# Patient Record
Sex: FEMALE | Race: BLACK OR AFRICAN AMERICAN | NOT HISPANIC OR LATINO | Employment: OTHER | URBAN - METROPOLITAN AREA
[De-identification: names, ages, dates, MRNs, and addresses within clinical notes are randomized per-mention and may not be internally consistent; named-entity substitution may affect disease eponyms.]

---

## 2017-04-05 ENCOUNTER — OFFICE VISIT (OUTPATIENT)
Dept: INTERNAL MEDICINE | Facility: CLINIC | Age: 67
End: 2017-04-05
Payer: MEDICARE

## 2017-04-05 VITALS
HEART RATE: 66 BPM | HEIGHT: 62 IN | SYSTOLIC BLOOD PRESSURE: 136 MMHG | DIASTOLIC BLOOD PRESSURE: 80 MMHG | TEMPERATURE: 98 F | BODY MASS INDEX: 33.34 KG/M2 | WEIGHT: 181.19 LBS

## 2017-04-05 DIAGNOSIS — Z00.00 ENCOUNTER FOR PREVENTIVE HEALTH EXAMINATION: Primary | ICD-10-CM

## 2017-04-05 DIAGNOSIS — I70.0 ATHEROSCLEROSIS OF AORTA: ICD-10-CM

## 2017-04-05 DIAGNOSIS — E78.5 HYPERLIPIDEMIA, UNSPECIFIED HYPERLIPIDEMIA TYPE: ICD-10-CM

## 2017-04-05 DIAGNOSIS — I10 ESSENTIAL HYPERTENSION: ICD-10-CM

## 2017-04-05 DIAGNOSIS — I69.30 HISTORY OF CVA WITH RESIDUAL DEFICIT: ICD-10-CM

## 2017-04-05 PROCEDURE — 99999 PR PBB SHADOW E&M-EST. PATIENT-LVL III: CPT | Mod: PBBFAC,,, | Performed by: PHYSICIAN ASSISTANT

## 2017-04-05 PROCEDURE — G0009 ADMIN PNEUMOCOCCAL VACCINE: HCPCS | Mod: S$GLB,,, | Performed by: PHYSICIAN ASSISTANT

## 2017-04-05 PROCEDURE — 3079F DIAST BP 80-89 MM HG: CPT | Mod: S$GLB,,, | Performed by: PHYSICIAN ASSISTANT

## 2017-04-05 PROCEDURE — 90732 PPSV23 VACC 2 YRS+ SUBQ/IM: CPT | Mod: S$GLB,,, | Performed by: PHYSICIAN ASSISTANT

## 2017-04-05 PROCEDURE — 3075F SYST BP GE 130 - 139MM HG: CPT | Mod: S$GLB,,, | Performed by: PHYSICIAN ASSISTANT

## 2017-04-05 PROCEDURE — 99499 UNLISTED E&M SERVICE: CPT | Mod: S$GLB,,, | Performed by: PHYSICIAN ASSISTANT

## 2017-04-05 PROCEDURE — G0439 PPPS, SUBSEQ VISIT: HCPCS | Mod: S$GLB,,, | Performed by: PHYSICIAN ASSISTANT

## 2017-04-05 NOTE — PATIENT INSTRUCTIONS
Counseling and Referral of Other Preventative  (Italic type indicates deductible and co-insurance are waived)    Patient Name: Tracy Najera  Today's Date: 4/5/2017      SERVICE LIMITATIONS RECOMMENDATION    Vaccines    · Pneumococcal (once after 65)    · Influenza (annually)    · Hepatitis B (if medium/high risk)    · Prevnar 13      Hepatitis B medium/high risk factors:       - End-stage renal disease       - Hemophiliacs who received Factor VII or         IX concentrates       - Clients of institutions for the mentally             retarded       - Persons who live in the same house as          a HepB carrier       - Homosexual men       - Illicit injectable drug abusers     Pneumococcal: Scheduled - see appointments     Influenza: Done, repeat in one year     Hepatitis B: Done, no repeat necessary     Prevnar 13: Done, no repeat necessary    Mammogram (biennial age 50-74)  Annually (age 40 or over)  Done this year, repeat every year    Pap (up to age 70 and after 70 if unknown history or abnormal study last 10 years)    N/A     The USPSTF recommends against screening for cervical cancer in women who have had a hysterectomy with removal of the cervix and who do not have a history of a high-grade precancerous lesion (cervical intraepithelial neoplasia [ARINA] grade 2 or 3) or cervical cancer.     Colorectal cancer screening (to age 75)    · Fecal occult blood test (annual)  · Flexible sigmoidoscopy (5y)  · Screening colonoscopy (10y)  · Barium enema   Last done 2013, recommend to repeat every 5  years    Diabetes self-management training (no USPSTF recommendations)  Requires referral by treating physician for patient with diabetes or renal disease. 10 hours of initial DSMT sessions of no less than 30 minutes each in a continuous 12-month period. 2 hours of follow-up DSMT in subsequent years.  N/A    Bone mass measurements (age 65 & older, biennial)  Requires diagnosis related to osteoporosis or estrogen  deficiency. Biennial benefit unless patient has history of long-term glucocorticoid  Last done 2015, recommend to repeat every 3  years    Glaucoma screening (no USPSTF recommendation)  Diabetes mellitus, family history   , age 50 or over    American, age 65 or over  N/A    Medical nutrition therapy for diabetes or renal disease (no recommended schedule)  Requires referral by treating physician for patient with diabetes or renal disease or kidney transplant within the past 3 years.  Can be provided in same year as diabetes self-management training (DSMT), and CMS recommends medical nutrition therapy take place after DSMT. Up to 3 hours for initial year and 2 hours in subsequent years.  N/A    Cardiovascular screening blood tests (every 5 years)  · Fasting lipid panel  Order as a panel if possible  Done this year, repeat every year    Diabetes screening tests (at least every 3 years, Medicare covers annually or at 6-month intervals for prediabetic patients)  · Fasting blood sugar (FBS) or glucose tolerance test (GTT)  Patient must be diagnosed with one of the following:       - Hypertension       - Dyslipidemia       - Obesity (BMI 30kg/m2)       - Previous elevated impaired FBS or GTT       ... or any two of the following:       - Overweight (BMI 25 but <30)       - Family history of diabetes       - Age 65 or older       - History of gestational diabetes or birth of baby weighing more than 9 pounds  N/A    Abdominal aortic aneurysm screening (once)  · Sonogram   Limited to patients who meet one of the following criteria:       - Men who are 65-75 years old and have smoked more than 100 cigarette in their lifetime       - Anyone with a family history of abdominal aortic aneurysm       - Anyone recommended for screening by the USPSTF  N/A    HIV screening (annually for increased risk patients)  · HIV-1 and HIV-2 by EIA, or YADI, rapid antibody test or oral mucosa transudate  Patients must be  at increased risk for HIV infection per USPSTF guidelines or pregnant. Tests covered annually for patient at increased risk or as requested by the patient. Pregnant patients may receive up to 3 tests during pregnancy.  Risks discussed, screening is not recommended    Smoking cessation counseling (up to 8 sessions per year)  Patients must be asymptomatic of tobacco-related conditions to receive as a preventative service.  Non-smoker    Subsequent annual wellness visit  At least 12 months since last AWV  Return in one year     The following information is provided to all patients.  This information is to help you find resources for any of the problems found today that may be affecting your health:                Living healthy guide: www.[facility].[place].[facility]      Understanding Diabetes: www.diabetes.org      Eating healthy: www.cdc.gov/healthyweight      CDC home safety checklist: www.cdc.gov/steadi/patient.html      Agency on Aging: www.goea.[place].[facility]      Alcoholics anonymous (AA): www.aa.org      Physical Activity: www.elder.nih.gov/oa2afdb      Tobacco use: www.quitwithusla.org

## 2017-04-05 NOTE — PROGRESS NOTES
"Subjective:       Patient ID: Tracy Najera is a 66 y.o. female.    Chief Complaint: HRA    HPI    Past Medical History:   Diagnosis Date    CVA (cerebral vascular accident) 2008    R hemiparesis, R hand contracture    HTN (hypertension)     Hyperlipidemia        Current Outpatient Prescriptions   Medication Sig Dispense Refill    atorvastatin (LIPITOR) 20 MG tablet Take 1 tablet (20 mg total) by mouth once daily. 90 tablet 3    clopidogrel (PLAVIX) 75 mg tablet Take 1 tablet (75 mg total) by mouth once daily. 90 tablet 3    losartan (COZAAR) 100 MG tablet Take 1 tablet (100 mg total) by mouth once daily. 90 tablet 3    hydrochlorothiazide (HYDRODIURIL) 12.5 MG Tab Take 1 tablet (12.5 mg total) by mouth once daily. 90 tablet 3     No current facility-administered medications for this visit.        Review of Systems    Objective:   /80  Pulse 66  Temp 98 °F (36.7 °C) (Tympanic)   Ht 5' 1.5" (1.562 m)  Wt 82.2 kg (181 lb 3.5 oz)  BMI 33.69 kg/m2     Physical Exam      Lab Results   Component Value Date    WBC 5.57 10/06/2016    HGB 12.2 10/06/2016    HCT 38.2 10/06/2016     10/06/2016    CHOL 202 (H) 10/06/2016    TRIG 72 10/06/2016    HDL 61 10/06/2016    ALT 24 10/06/2016    AST 22 10/06/2016     10/06/2016    K 3.9 10/06/2016     10/06/2016    CREATININE 1.0 10/06/2016    BUN 14 10/06/2016    CO2 23 10/06/2016    TSH 1.264 10/06/2016    INR 1.0 04/30/2012       Assessment:       1. Encounter for preventive health examination        Plan:   Encounter for preventive health examination      "

## 2017-04-26 PROBLEM — I70.0 ATHEROSCLEROSIS OF AORTA: Status: ACTIVE | Noted: 2017-04-26

## 2017-04-26 NOTE — PROGRESS NOTES
"Tracy Najera presented for a  Medicare AWV and comprehensive Health Risk Assessment today. The following components were reviewed and updated:    · Medical history  · Family History  · Social history  · Allergies and Current Medications  · Health Risk Assessment  · Health Maintenance  · Care Team     ** See Completed Assessments for Annual Wellness Visit within the encounter summary.**       The following assessments were completed:  · Living Situation  · CAGE  · Depression Screening  · Timed Get Up and Go  · Whisper Test  · Cognitive Function Screening  · Nutrition Screening  · ADL Screening  · PAQ Screening    Vitals:    04/05/17 1253   BP: 136/80   Pulse: 66   Temp: 98 °F (36.7 °C)   TempSrc: Tympanic   Weight: 82.2 kg (181 lb 3.5 oz)   Height: 5' 1.5" (1.562 m)     Body mass index is 33.69 kg/(m^2).  Physical Exam   Constitutional: She is oriented to person, place, and time. She appears well-developed and well-nourished. No distress.   HENT:   Head: Normocephalic and atraumatic.   Right Ear: External ear normal.   Left Ear: External ear normal.   Mouth/Throat: Oropharynx is clear and moist.   Eyes: Conjunctivae and EOM are normal. Pupils are equal, round, and reactive to light.   Neck: Normal range of motion. Neck supple.   Cardiovascular: Normal rate, regular rhythm and normal heart sounds.    Pulmonary/Chest: Effort normal and breath sounds normal.   Abdominal: Soft.   Neurological: She is alert and oriented to person, place, and time. She displays normal reflexes. No cranial nerve deficit. Coordination normal.         Diagnoses and health risks identified today and associated recommendations/orders:    Encounter for preventive health examination    Hyperlipidemia, unspecified hyperlipidemia type  Comments:  On statin. Last labs show ok control but need to be monitored.     History of CVA with residual deficit  Comments:  No Hemiparesis.  Pt improved greatly with therapy.  BP, HLD controlled, patient also on " plavix.     Essential hypertension  Comments:  Controlled on losartan, HCTZ. Followed by PCP.     Atherosclerosis of aorta  Comments:  Mildly tortuous thoracic aorta per CXR in 2011        Provided Tracy with a 5-10 year written screening schedule and personal prevention plan. Recommendations were developed using the USPSTF age appropriate recommendations. Education, counseling, and referrals were provided as needed. After Visit Summary printed and given to patient which includes a list of additional screenings\tests needed.    No Follow-up on file.    MALACHI Reardon

## 2017-05-05 ENCOUNTER — OFFICE VISIT (OUTPATIENT)
Dept: INTERNAL MEDICINE | Facility: CLINIC | Age: 67
End: 2017-05-05
Payer: MEDICARE

## 2017-05-05 ENCOUNTER — TELEPHONE (OUTPATIENT)
Dept: INTERNAL MEDICINE | Facility: CLINIC | Age: 67
End: 2017-05-05

## 2017-05-05 VITALS
DIASTOLIC BLOOD PRESSURE: 86 MMHG | BODY MASS INDEX: 30.68 KG/M2 | SYSTOLIC BLOOD PRESSURE: 132 MMHG | HEIGHT: 64 IN | WEIGHT: 179.69 LBS | HEART RATE: 64 BPM | TEMPERATURE: 99 F

## 2017-05-05 DIAGNOSIS — N64.52 BLOODY DISCHARGE FROM LEFT NIPPLE: ICD-10-CM

## 2017-05-05 DIAGNOSIS — E78.5 HYPERLIPIDEMIA, UNSPECIFIED HYPERLIPIDEMIA TYPE: ICD-10-CM

## 2017-05-05 DIAGNOSIS — I69.30 HISTORY OF CVA WITH RESIDUAL DEFICIT: ICD-10-CM

## 2017-05-05 DIAGNOSIS — I10 ESSENTIAL HYPERTENSION: Primary | ICD-10-CM

## 2017-05-05 DIAGNOSIS — N64.52 BLOODY DISCHARGE FROM LEFT NIPPLE: Primary | ICD-10-CM

## 2017-05-05 DIAGNOSIS — I69.359 CVA, OLD, HEMIPARESIS: ICD-10-CM

## 2017-05-05 PROCEDURE — 99999 PR PBB SHADOW E&M-EST. PATIENT-LVL III: CPT | Mod: PBBFAC,,, | Performed by: FAMILY MEDICINE

## 2017-05-05 PROCEDURE — 3075F SYST BP GE 130 - 139MM HG: CPT | Mod: S$GLB,,, | Performed by: FAMILY MEDICINE

## 2017-05-05 PROCEDURE — 1126F AMNT PAIN NOTED NONE PRSNT: CPT | Mod: S$GLB,,, | Performed by: FAMILY MEDICINE

## 2017-05-05 PROCEDURE — 1160F RVW MEDS BY RX/DR IN RCRD: CPT | Mod: S$GLB,,, | Performed by: FAMILY MEDICINE

## 2017-05-05 PROCEDURE — 1157F ADVNC CARE PLAN IN RCRD: CPT | Mod: 8P,S$GLB,, | Performed by: FAMILY MEDICINE

## 2017-05-05 PROCEDURE — 99214 OFFICE O/P EST MOD 30 MIN: CPT | Mod: S$GLB,,, | Performed by: FAMILY MEDICINE

## 2017-05-05 PROCEDURE — 1159F MED LIST DOCD IN RCRD: CPT | Mod: S$GLB,,, | Performed by: FAMILY MEDICINE

## 2017-05-05 PROCEDURE — 99499 UNLISTED E&M SERVICE: CPT | Mod: S$GLB,,, | Performed by: FAMILY MEDICINE

## 2017-05-05 PROCEDURE — 3079F DIAST BP 80-89 MM HG: CPT | Mod: S$GLB,,, | Performed by: FAMILY MEDICINE

## 2017-05-05 NOTE — PROGRESS NOTES
"Subjective:      Patient ID: Tracy Najera is a 66 y.o. female.    Chief Complaint: Follow-up (6 month )    HPI  67 yo female with hx of CVA with some R sided hemiparesis/contracture here for f/u.  Taking meds daily, no problems.  Had wanted to work on weight loss, but no change since visit 6 mos ago.  Past few days has noticed some bloody discharge from her Left nipple.  No pain.  No trauma/abrasion.  NO change in clothing or bra.  It soaked thru her bra and shirt yesterday.  Normally does mammogram at [facility], last one 2016//within past 5-6 mos she thinks.  No fam hx of breast cancer.    Past Medical History:   Diagnosis Date    CVA (cerebral vascular accident) 2008    R hemiparesis, R hand contracture    HTN (hypertension)     Hyperlipidemia      Family History   Problem Relation Age of Onset    Hypertension Mother     Hypertension Sister     Stroke Brother     Cancer Neg Hx      Past Surgical History:   Procedure Laterality Date    HYSTERECTOMY      partial     TONSILLECTOMY       Social History   Substance Use Topics    Smoking status: Never Smoker    Smokeless tobacco: None    Alcohol use 0.0 oz/week     0 Standard drinks or equivalent per week      Comment: occ       /86  Pulse 64  Temp 98.5 °F (36.9 °C) (Tympanic)   Ht 5' 4" (1.626 m)  Wt 81.5 kg (179 lb 10.8 oz)  BMI 30.84 kg/m2    Review of Systems   Constitutional: Negative for chills, fever and unexpected weight change.   HENT: Negative.    Eyes: Negative.    Respiratory: Negative.    Cardiovascular: Negative.    Gastrointestinal: Negative.    Endocrine: Negative.    Genitourinary: Negative.    Neurological: Negative for dizziness and light-headedness.     Objective:     Physical Exam   Constitutional: She is oriented to person, place, and time. She appears well-developed and well-nourished. No distress.   Eyes: Pupils are equal, round, and reactive to light.   Neck: Normal range of motion. Neck supple.   Cardiovascular: Normal " rate, regular rhythm and normal heart sounds.    Pulmonary/Chest: Effort normal and breath sounds normal. No respiratory distress. She has no wheezes. She has no rales. Right breast exhibits no inverted nipple, no mass, no nipple discharge, no skin change and no tenderness. Left breast exhibits nipple discharge. Left breast exhibits no inverted nipple, no mass, no skin change and no tenderness.   Able to barely press on Left nipple, at first there was bloody/serous discharge but with continued pressure more of a thick bloody discharge noted.  No pain/tenderness.   Abdominal: Soft. Bowel sounds are normal. She exhibits no distension. There is no tenderness.   Genitourinary: There is breast discharge and bleeding.   Musculoskeletal: She exhibits no edema.   Lymphadenopathy:     She has no cervical adenopathy.   Neurological: She is alert and oriented to person, place, and time. No cranial nerve deficit.   Skin: Skin is warm and dry. No rash noted.   Psychiatric: She has a normal mood and affect. Her behavior is normal. Judgment and thought content normal.   Nursing note and vitals reviewed.      Lab Results   Component Value Date    WBC 5.57 10/06/2016    HGB 12.2 10/06/2016    HCT 38.2 10/06/2016     10/06/2016    CHOL 202 (H) 10/06/2016    TRIG 72 10/06/2016    HDL 61 10/06/2016    ALT 24 10/06/2016    AST 22 10/06/2016     10/06/2016    K 3.9 10/06/2016     10/06/2016    CREATININE 1.0 10/06/2016    BUN 14 10/06/2016    CO2 23 10/06/2016    TSH 1.264 10/06/2016    INR 1.0 04/30/2012       Assessment:     1. Essential hypertension    2. Hyperlipidemia, unspecified hyperlipidemia type    3. History of CVA with residual deficit    4. CVA, old, hemiparesis    5. Bloody discharge from left nipple       Plan:   Essential hypertension    Hyperlipidemia, unspecified hyperlipidemia type  -     TSH; Future; Expected date: 5/5/17    History of CVA with residual deficit    CVA, old, hemiparesis    Bloody  discharge from left nipple  -     Mammo Digital Diagnostic Left with CAD; Future; Expected date: 5/5/17  -     CBC auto differential; Future; Expected date: 5/5/17  -     TSH; Future; Expected date: 5/5/17  -     Prolactin; Future; Expected date: 5/5/17    Unilateral bloody nipple discharge on L side  Diagnostic mammo/US if needed  CBC/TSH/prolactin level today  BP stable  Work on weight  Will call with results/recs on above  F/u 6 mos for chronic conditions

## 2017-05-05 NOTE — TELEPHONE ENCOUNTER
Can you re entered labs for pt.  Ana couldn't draw her as she was very dehydrated.  [facility] will do [facility] on Monday along with her mammogram.  Will just need to link to lab appt.

## 2017-05-05 NOTE — MR AVS SNAPSHOT
[facility]  [address] [place] [place] [address]  Phone: [phone]  Fax: [phone]                  Tracy Najera   2017 1:00 PM   Office Visit    Description:  Female : 1950   Provider:  Ryan Hopper MD   [facility]           Reason for Visit     Follow-up           Diagnoses this Visit        Comments    Essential hypertension    -  Primary     Hyperlipidemia, unspecified hyperlipidemia type         History of CVA with residual deficit         CVA, old, hemiparesis         Bloody discharge from left nipple                To Do List           Future Appointments        Provider Department Dept Phone    2017 1:00 PM Ryan Hopper MD [facility] [phone]    2017 1:30 PM [facility] [phone]    2017 10:00 AM [facility] MAMMO2-DX [facility] [phone]      Goals (5 Years of Data)     None      [facility]     [facility] Nurse Care Line -  Assistance  Unless otherwise directed by your provider, please contact [facility], our nurse care line that is available for  assistance.     Registered nurses in the [facility] provide: appointment scheduling, clinical advisement, health education, and other advisory services.  Call: [phone] (toll free)               Medications           Message regarding Medications     Verify the changes and/or additions to your medication regime listed below are the same as discussed with your clinician today.  If any of these changes or additions are incorrect, please notify your healthcare provider.             Verify that the below list of medications is an accurate representation of the medications you are currently taking.  If none reported, the list may be blank. If incorrect, please contact your healthcare provider. Carry this list with you in case of  "emergency.           Current Medications     atorvastatin (LIPITOR) 20 MG tablet Take 1 tablet (20 mg total) by mouth once daily.    clopidogrel (PLAVIX) 75 mg tablet Take 1 tablet (75 mg total) by mouth once daily.    hydrochlorothiazide (HYDRODIURIL) 12.5 MG Tab Take 1 tablet (12.5 mg total) by mouth once daily.    losartan (COZAAR) 100 MG tablet Take 1 tablet (100 mg total) by mouth once daily.           Clinical Reference Information           Your Vitals Were     BP Pulse Temp Height Weight BMI    132/86 64 98.5 °F (36.9 °C) (Tympanic) 5' 4" (1.626 m) 81.5 kg (179 lb 10.8 oz) 30.84 kg/m2      Blood Pressure          Most Recent Value    BP  132/86      Allergies as of 5/5/2017     No Known Allergies      Immunizations Administered on Date of Encounter - 5/5/2017     None      Orders Placed During Today's Visit     Future Labs/Procedures Expected by Expires    CBC auto differential  5/5/2017 8/3/2017    Mammo Digital Diagnostic Left with CAD  5/5/2017 7/5/2018    Prolactin  5/5/2017 7/4/2018    TSH  5/5/2017 8/3/2017      Language Assistance Services     ATTENTION: Language assistance services are available, free of charge. Please call [phone].      ATENCIÓN: Si joanie arteaga, tiene a smith disposición servicios gratuitos de asistencia lingüística. Llame al [phone].     [facility] Ý: N?u b?n nói Ti?ng Vi?t, có các d?ch v? h? tr? ngôn ng? mi?n phí dành cho b?n. G?i s? [phone].         [facility] complies with applicable Federal civil rights laws and does not discriminate on the basis of race, color, national origin, age, disability, or sex.        "

## 2017-05-08 ENCOUNTER — TELEPHONE (OUTPATIENT)
Dept: INTERNAL MEDICINE | Facility: CLINIC | Age: 67
End: 2017-05-08

## 2017-05-08 ENCOUNTER — HOSPITAL ENCOUNTER (OUTPATIENT)
Dept: RADIOLOGY | Facility: HOSPITAL | Age: 67
Discharge: HOME | End: 2017-05-08
Attending: FAMILY MEDICINE
Payer: MEDICARE

## 2017-05-08 DIAGNOSIS — N64.52 BLOODY DISCHARGE FROM LEFT NIPPLE: ICD-10-CM

## 2017-05-08 DIAGNOSIS — R92.8 ABNORMAL MAMMOGRAM OF LEFT BREAST: ICD-10-CM

## 2017-05-08 DIAGNOSIS — N64.52 BLOODY DISCHARGE FROM LEFT NIPPLE: Primary | ICD-10-CM

## 2017-05-08 PROCEDURE — 77065 DX MAMMO INCL CAD UNI: CPT | Mod: 26,LT,, | Performed by: RADIOLOGY

## 2017-05-08 PROCEDURE — 76642 ULTRASOUND BREAST LIMITED: CPT | Mod: TC,PO,LT

## 2017-05-08 PROCEDURE — 77061 BREAST TOMOSYNTHESIS UNI: CPT | Mod: 26,LT,, | Performed by: RADIOLOGY

## 2017-05-08 PROCEDURE — 77061 BREAST TOMOSYNTHESIS UNI: CPT | Mod: TC,LT

## 2017-05-08 PROCEDURE — 76642 ULTRASOUND BREAST LIMITED: CPT | Mod: 26,LT,, | Performed by: RADIOLOGY

## 2017-05-08 NOTE — TELEPHONE ENCOUNTER
Pt needs to see surgery for Left bloody nipple discharge.  Order in.  Can see Danay Madera first if needed.

## 2017-05-09 ENCOUNTER — DOCUMENTATION ONLY (OUTPATIENT)
Dept: RADIOLOGY | Facility: HOSPITAL | Age: 67
End: 2017-05-09

## 2017-05-09 NOTE — PROGRESS NOTES
Breast Care Management Follow-Up:    Date of Mammogram:05/08/17    Mammogram Reason:Bloody discharge from left breast    Mammogram Results:and Left U/S - Finding 1 - 4mm round mass in the left breast at 2:00 is consistent with a simple cyst. Finding 2 - single dilated mildly ectatic duct in the left breast.      Referrals/Recommendations:Surgery consult; galactogram or MRI may be helpful. Annual mammo rec.        Patient Status:05/09/17 Pt has an appt with Danay Madera NP on 5/10/17. Results letter and report mailed to pt.

## 2017-05-10 ENCOUNTER — OFFICE VISIT (OUTPATIENT)
Dept: SURGERY | Facility: CLINIC | Age: 67
End: 2017-05-10
Payer: MEDICARE

## 2017-05-10 VITALS
WEIGHT: 179 LBS | SYSTOLIC BLOOD PRESSURE: 124 MMHG | TEMPERATURE: 98 F | BODY MASS INDEX: 31.71 KG/M2 | DIASTOLIC BLOOD PRESSURE: 62 MMHG | HEART RATE: 64 BPM

## 2017-05-10 VITALS
SYSTOLIC BLOOD PRESSURE: 124 MMHG | TEMPERATURE: 98 F | RESPIRATION RATE: 18 BRPM | HEIGHT: 63 IN | BODY MASS INDEX: 31.71 KG/M2 | WEIGHT: 179 LBS | OXYGEN SATURATION: 99 % | DIASTOLIC BLOOD PRESSURE: 62 MMHG

## 2017-05-10 DIAGNOSIS — N64.52 BLOODY DISCHARGE FROM LEFT NIPPLE: Primary | ICD-10-CM

## 2017-05-10 DIAGNOSIS — R92.8 ABNORMAL MAMMOGRAM OF LEFT BREAST: ICD-10-CM

## 2017-05-10 PROCEDURE — 3074F SYST BP LT 130 MM HG: CPT | Mod: S$GLB,,, | Performed by: SURGERY

## 2017-05-10 PROCEDURE — 1160F RVW MEDS BY RX/DR IN RCRD: CPT | Mod: S$GLB,,, | Performed by: SURGERY

## 2017-05-10 PROCEDURE — 99204 OFFICE O/P NEW MOD 45 MIN: CPT | Mod: S$GLB,,, | Performed by: SURGERY

## 2017-05-10 PROCEDURE — 3078F DIAST BP <80 MM HG: CPT | Mod: S$GLB,,, | Performed by: SURGERY

## 2017-05-10 PROCEDURE — 1159F MED LIST DOCD IN RCRD: CPT | Mod: S$GLB,,, | Performed by: SURGERY

## 2017-05-10 PROCEDURE — 99999 PR PBB SHADOW E&M-EST. PATIENT-LVL III: CPT | Mod: PBBFAC,,, | Performed by: SURGERY

## 2017-05-10 PROCEDURE — 99999 PR PBB SHADOW E&M-EST. PATIENT-LVL III: CPT | Mod: PBBFAC,,, | Performed by: NURSE PRACTITIONER

## 2017-05-10 RX ORDER — ONDANSETRON 4 MG/1
8 TABLET, ORALLY DISINTEGRATING ORAL EVERY 8 HOURS PRN
Status: CANCELLED | OUTPATIENT
Start: 2017-05-10

## 2017-05-10 RX ORDER — SODIUM CHLORIDE 9 MG/ML
INJECTION, SOLUTION INTRAVENOUS CONTINUOUS
Status: CANCELLED | OUTPATIENT
Start: 2017-05-10

## 2017-05-10 NOTE — PROGRESS NOTES
Patient ID: Tracy Najera is a 66 y.o. female.    Chief Complaint: Breast Discharge/Blood    HPI: Patient is referred by her PCP for the evaluation of bloody spontaneous nipple discharge that began about one week ago. Initially noted clear nipple discharge on her underclothes - was never bloody until went to her PCP- was noted to be blood at that time and has had one episode of blood on her underclothes since.     Review of Systems   Constitutional: Negative.  Negative for activity change and unexpected weight change.   HENT: Negative.  Negative for hearing loss, rhinorrhea and trouble swallowing.    Eyes: Negative.  Negative for discharge and visual disturbance.   Respiratory: Negative.  Negative for chest tightness and wheezing.    Cardiovascular: Negative.  Negative for chest pain and palpitations.   Gastrointestinal: Negative.  Negative for blood in stool, constipation, diarrhea and vomiting.   Endocrine: Negative.  Negative for polydipsia and polyuria.   Genitourinary: Negative.  Negative for difficulty urinating, dysuria, hematuria and menstrual problem.   Musculoskeletal: Positive for gait problem. Negative for arthralgias and joint swelling.        Has right sided weakness from CVA 10 years ago   Skin: Negative.    Allergic/Immunologic: Negative.    Neurological: Negative for weakness and headaches.   Hematological: Negative.    Psychiatric/Behavioral: Negative.  Negative for confusion and dysphoric mood.   Breast: pt denies any previous breast problems, no breast trauma, no breast pain and no history of breast discharge before recently. Denies any change in her medications.     Current Outpatient Prescriptions   Medication Sig Dispense Refill    atorvastatin (LIPITOR) 20 MG tablet Take 1 tablet (20 mg total) by mouth once daily. 90 tablet 3    clopidogrel (PLAVIX) 75 mg tablet Take 1 tablet (75 mg total) by mouth once daily. 90 tablet 3    hydrochlorothiazide (HYDRODIURIL) 12.5 MG Tab Take 1 tablet  (12.5 mg total) by mouth once daily. 90 tablet 3    losartan (COZAAR) 100 MG tablet Take 1 tablet (100 mg total) by mouth once daily. 90 tablet 3     No current facility-administered medications for this visit.        Review of patient's allergies indicates:  No Known Allergies    Past Medical History:   Diagnosis Date    CVA (cerebral vascular accident) 2008    R hemiparesis, R hand contracture    HTN (hypertension)     Hyperlipidemia        Past Surgical History:   Procedure Laterality Date    HYSTERECTOMY      partial     TONSILLECTOMY         Family History   Problem Relation Age of Onset    Hypertension Mother     Hypertension Sister     Stroke Brother     Cancer Neg Hx        Social History     Social History    Marital status:      Spouse name: N/A    Number of children: 1    Years of education: N/A     Occupational History     Not Employ     Social History Main Topics    Smoking status: Never Smoker    Smokeless tobacco: Not on file    Alcohol use 0.0 oz/week     0 Standard drinks or equivalent per week      Comment: occ    Drug use: No    Sexual activity: Yes     Partners: Male     Other Topics Concern    Not on file     Social History Narrative       Vitals:    05/10/17 1451   BP: 124/62   Resp: 18   Temp: 97.5 °F (36.4 °C)       Physical Exam   Constitutional: She is oriented to person, place, and time. She appears well-developed and well-nourished.   HENT:   Head: Normocephalic and atraumatic.   Right Ear: External ear normal.   Left Ear: External ear normal.   Eyes: Conjunctivae and EOM are normal. Pupils are equal, round, and reactive to light. Right eye exhibits no discharge. Left eye exhibits no discharge. No scleral icterus.   Neck: Normal range of motion. Neck supple. No thyromegaly present.   Cardiovascular: Normal rate and regular rhythm.    Pulmonary/Chest: Effort normal and breath sounds normal. Right breast exhibits no inverted nipple, no mass, no nipple discharge,  no skin change and no tenderness. Left breast exhibits nipple discharge. Left breast exhibits no inverted nipple, no mass, no skin change and no tenderness.       Abdominal: Soft. Bowel sounds are normal.   Musculoskeletal: Normal range of motion.   Lymphadenopathy:        Head (right side): No submental, no submandibular, no tonsillar, no preauricular, no posterior auricular and no occipital adenopathy present.        Head (left side): No submental, no submandibular, no tonsillar, no preauricular, no posterior auricular and no occipital adenopathy present.     She has no cervical adenopathy.        Right cervical: No superficial cervical and no posterior cervical adenopathy present.       Left cervical: No superficial cervical and no posterior cervical adenopathy present.     She has no axillary adenopathy.        Right: No supraclavicular adenopathy present.        Left: No supraclavicular adenopathy present.   Neurological: She is alert and oriented to person, place, and time.   Skin: Skin is warm. No erythema.   Psychiatric: She has a normal mood and affect. Her behavior is normal. Judgment and thought content normal.   Nursing note and vitals reviewed.    IMAGIN17:  MAMMO DIGITAL DIAGNOSTIC LEFT WITH TOMOSYNTHESIS_CAD  Views: left craniocaudal with tomosynthesis; left mediolateral with  tomosynthesis; left mediolateral oblique with tomosynthesis    There are scattered fibroglandular densities.    Finding 1:  There is a round mass measuring 4 millimeters with  circumscribed margins seen in the left breast at 2 o'clock.  Digital tomosynthesis was performed and used in the interpretation of the  images.  Images were evaluated with a Computer Aided Detection (CAD) system.    LEFT LIMITED ULTRASOUND FINDINGS:  High-resolution real-time ultrasound scanning was performed.    Finding 1:  The 2 o'clock lesion within the left breast is most consistent  with a simple cyst.    Finding 2:  Ultrasound evaluation of  the subareolar region of the left  breast demonstrates a single dilated mildly ectatic duct.  No obvious  intraductal masses are identified.  Impression  Finding 1:  Mass in the left breast is benign-negative.    Finding 2:  Area in the left breast requires additional evaluation. With  the given history of bloody nipple discharge, further evaluation with  galactogram or MRI may be helpful. Surgical consultation is also  recommended.    Routine follow-up mammogram in 1 year is recommended.    BI-RADS Category 0: Incomplete: Need Additional Evaluation    Menarche at 15 y/o   Misc 1  LMP: 40's  First pregnancy at 29 y/o  No hormone use and no history of radiation to the neck or chest wall    FH: negative for breast cancer    Pt takes plavix due to her history of CVA 10 years ago- has stopped it for a few days in the past for procedures with no problems.     Assessment & Plan:  1. Spontaneous bloody nipple discharge left breast nipple duct 9 oclock  2. Abnormal breast imaging- dilated duct.  3. Explained imaging results and clinical findings to the pt.  4. Recommend duct excision to rule out a papilloma that could be causing the spontaneous nipple discharge. Explained the procedure to the pt- usually outpatient- would have to stop her plavix prior to the procedure - ducts from under the nipple would be surgically removed and sent for path evaluation. Risk of bleeding, infection, blistering of the nipple, scarring, recurrence of discharge were discussed with the pt.   5. Pt scheduled to see Dr. John today to schedule this procedure. Pt agrees with the recommendations and verbalizes understanding.

## 2017-05-10 NOTE — MR AVS SNAPSHOT
[facility]  [address] [place] [address]  Phone: [phone]  Fax: [phone]                  Tracy Najera   5/10/2017 4:00 PM   Office Visit    Description:  Female : 1950   Provider:  Bernard John MD   Department:  [facility]           Reason for Visit     Consult           Diagnoses this Visit        Comments    Bloody discharge from left nipple    -  Primary            To Do List           Future Appointments        Provider Department Dept Phone    2017 10:15 AM EKG, [facility] [phone]    2017 10:50 AM LABORATORY, [facility] [phone]    2017 11:00 AM Bernard John MD [facility] [phone]      Goals (5 Years of Data)     None      [facility]     [facility] Nurse Care Line -  Assistance  Unless otherwise directed by your provider, please contact [facility], our nurse care line that is available for  assistance.     Registered nurses in the [facility] Center provide: appointment scheduling, clinical advisement, health education, and other advisory services.  Call: [phone] (toll free)               Medications           Message regarding Medications     Verify the changes and/or additions to your medication regime listed below are the same as discussed with your clinician today.  If any of these changes or additions are incorrect, please notify your healthcare provider.             Verify that the below list of medications is an accurate representation of the medications you are currently taking.  If none reported, the list may be blank. If incorrect, please contact your healthcare provider. Carry this list with you in case of emergency.           Current Medications     atorvastatin (LIPITOR) 20 MG tablet Take 1 tablet (20 mg total) by mouth once daily.    clopidogrel (PLAVIX) 75 mg tablet Take 1 tablet (75 mg  total) by mouth once daily.    hydrochlorothiazide (HYDRODIURIL) 12.5 MG Tab Take 1 tablet (12.5 mg total) by mouth once daily.    losartan (COZAAR) 100 MG tablet Take 1 tablet (100 mg total) by mouth once daily.           Clinical Reference Information           Your Vitals Were     BP Pulse Temp Weight BMI    124/62 64 97.5 °F (36.4 °C) (Oral) 81.2 kg (179 lb 0.2 oz) 31.71 kg/m2      Blood Pressure          Most Recent Value    BP  124/62      Allergies as of 5/10/2017     No Known Allergies      Immunizations Administered on Date of Encounter - 5/10/2017     None      Orders Placed During Today's Visit      Normal Orders This Visit    Case Request Operating Room: BIOPSY-BREAST excision ducts     Future Labs/Procedures Expected by Expires    Comprehensive metabolic panel  5/10/2017 7/9/2018    EKG 12-lead  As directed 5/10/2018      Language Assistance Services     ATTENTION: Language assistance services are available, free of charge. Please call [phone].      ATENCIÓN: Si habla jenni, tiene a smith disposición servicios gratuitos de asistencia lingüística. Llame al [phone].     CHÚ Ý: N?u b?n nói Ti?ng Vi?t, có các d?ch v? h? tr? ngôn ng? mi?n phí dành cho b?n. G?i s? [phone].         [facility] complies with applicable Federal civil rights laws and does not discriminate on the basis of race, color, national origin, age, disability, or sex.

## 2017-05-10 NOTE — LETTER
May 10, 2017      Ryan Hopper MD  [address] [place] [address]           [facility]  [address] [address]  Phone: [phone]  Fax: [phone]          Patient: Tracy Najera   MR Number: [id number]   YOB: 1950   Date of Visit: 5/10/2017       Dear Dr. Ryan Hopper:    Thank you for referring Tracy Najera to me for evaluation. Attached you will find relevant portions of my assessment and plan of care.    If you have questions, please do not hesitate to call me. I look forward to following Tracy Najera along with you.    Sincerely,    Bernard John MD    Enclosure  CC:  No Recipients    If you would like to receive this communication electronically, please contact [email or web address] or [phone] to request more information on [employer] access.    For providers and/or their staff who would like to refer a patient to [facility], please contact us through our one-stop-shop provider referral line, [facility] , at [phone].    If you feel you have received this communication in error or would no longer like to receive these types of communications, please e-mail [email or web address]

## 2017-05-10 NOTE — LETTER
May 10, 2017      Ryan Hopper MD  [address] [place] [address]           [facility]  [address] [place] [address]  Phone: [phone]  Fax: [phone]          Patient: Tracy Najera   MR Number: [id number]   YOB: 1950   Date of Visit: 5/10/2017       Dear Dr. Ryan Hopper:    Thank you for referring Tracy Najera to me for evaluation. Attached you will find relevant portions of my assessment and plan of care.    If you have questions, please do not hesitate to call me. I look forward to following Tracy Najera along with you.    Sincerely,    Danay Madera NP    Enclosure  CC:  No Recipients    If you would like to receive this communication electronically, please contact [email or web address] or [phone] to request more information on [employer] access.    For providers and/or their staff who would like to refer a patient to [facility], please contact us through our one-stop-shop provider referral line, [facility], at [phone].    If you feel you have received this communication in error or would no longer like to receive these types of communications, please e-mail [email or web address]

## 2017-05-10 NOTE — PROGRESS NOTES
HPI: Patient is referred by her PCP for the evaluation of bloody spontaneous nipple discharge that began about one week ago. Initially noted clear nipple discharge on her underclothes - was never bloody until went to her PCP- was noted to be blood at that time and has had one episode of blood on her underclothes since.      Review of Systems   Constitutional: Negative. Negative for activity change and unexpected weight change.   HENT: Negative. Negative for hearing loss, rhinorrhea and trouble swallowing.   Eyes: Negative. Negative for discharge and visual disturbance.   Respiratory: Negative. Negative for chest tightness and wheezing.   Cardiovascular: Negative. Negative for chest pain and palpitations.   Gastrointestinal: Negative. Negative for blood in stool, constipation, diarrhea and vomiting.   Endocrine: Negative. Negative for polydipsia and polyuria.   Genitourinary: Negative. Negative for difficulty urinating, dysuria, hematuria and menstrual problem.   Musculoskeletal: Positive for gait problem. Negative for arthralgias and joint swelling.   Has right sided weakness from CVA 10 years ago   Skin: Negative.   Allergic/Immunologic: Negative.   Neurological: Negative for weakness and headaches.   Hematological: Negative.   Psychiatric/Behavioral: Negative. Negative for confusion and dysphoric mood.   Breast: pt denies any previous breast problems, no breast trauma, no breast pain and no history of breast discharge before recently. Denies any change in her medications.       Current Medications           Current Outpatient Prescriptions   Medication Sig Dispense Refill    atorvastatin (LIPITOR) 20 MG tablet Take 1 tablet (20 mg total) by mouth once daily. 90 tablet 3    clopidogrel (PLAVIX) 75 mg tablet Take 1 tablet (75 mg total) by mouth once daily. 90 tablet 3    hydrochlorothiazide (HYDRODIURIL) 12.5 MG Tab Take 1 tablet (12.5 mg total) by mouth once daily. 90 tablet 3    losartan (COZAAR) 100 MG tablet  Take 1 tablet (100 mg total) by mouth once daily. 90 tablet 3      No current facility-administered medications for this visit.             Review of patient's allergies indicates:  No Known Allergies          Past Medical History:   Diagnosis Date    CVA (cerebral vascular accident) 2008     R hemiparesis, R hand contracture    HTN (hypertension)      Hyperlipidemia                 Past Surgical History:   Procedure Laterality Date    HYSTERECTOMY         partial     TONSILLECTOMY                   Family History   Problem Relation Age of Onset    Hypertension Mother      Hypertension Sister      Stroke Brother      Cancer Neg Hx            Social History    Social History            Social History    Marital status:        Spouse name: N/A    Number of children: 1    Years of education: N/A           Occupational History      Not Employ              Social History Main Topics    Smoking status: Never Smoker    Smokeless tobacco: Not on file    Alcohol use 0.0 oz/week        0 Standard drinks or equivalent per week          Comment: occ    Drug use: No    Sexual activity: Yes       Partners: Male           Other Topics Concern    Not on file      Social History Narrative                Vitals:     05/10/17 1451   BP: 124/62   Resp: 18   Temp: 97.5 °F (36.4 °C)         Physical Exam   Constitutional: She is oriented to person, place, and time. She appears well-developed and well-nourished.   HENT:   Head: Normocephalic and atraumatic.   Right Ear: External ear normal.   Left Ear: External ear normal.   Eyes: Conjunctivae and EOM are normal. Pupils are equal, round, and reactive to light. Right eye exhibits no discharge. Left eye exhibits no discharge. No scleral icterus.   Neck: Normal range of motion. Neck supple. No thyromegaly present.   Cardiovascular: Normal rate and regular rhythm.   Pulmonary/Chest: Effort normal and breath sounds normal. Right breast exhibits no inverted nipple, no  mass, no nipple discharge, no skin change and no tenderness. Left breast exhibits nipple discharge. Left breast exhibits no inverted nipple, no mass, no skin change and no tenderness.       Abdominal: Soft. Bowel sounds are normal.   Musculoskeletal: Normal range of motion.   Lymphadenopathy:   Head (right side): No submental, no submandibular, no tonsillar, no preauricular, no posterior auricular and no occipital adenopathy present.   Head (left side): No submental, no submandibular, no tonsillar, no preauricular, no posterior auricular and no occipital adenopathy present.   She has no cervical adenopathy.   Right cervical: No superficial cervical and no posterior cervical adenopathy present.  Left cervical: No superficial cervical and no posterior cervical adenopathy present.   She has no axillary adenopathy.   Right: No supraclavicular adenopathy present.   Left: No supraclavicular adenopathy present.   Neurological: She is alert and oriented to person, place, and time.   Skin: Skin is warm. No erythema.   Psychiatric: She has a normal mood and affect. Her behavior is normal. Judgment and thought content normal.   Nursing note and vitals reviewed.     IMAGIN17:  MAMMO DIGITAL DIAGNOSTIC LEFT WITH TOMOSYNTHESIS_CAD  Views: left craniocaudal with tomosynthesis; left mediolateral with  tomosynthesis; left mediolateral oblique with tomosynthesis    There are scattered fibroglandular densities.    Finding 1:  There is a round mass measuring 4 millimeters with  circumscribed margins seen in the left breast at 2 o'clock.  Digital tomosynthesis was performed and used in the interpretation of the  images.  Images were evaluated with a Computer Aided Detection (CAD) system.    LEFT LIMITED ULTRASOUND FINDINGS:  High-resolution real-time ultrasound scanning was performed.    Finding 1:  The 2 o'clock lesion within the left breast is most consistent  with a simple cyst.    Finding 2:  Ultrasound evaluation of the  subareolar region of the left  breast demonstrates a single dilated mildly ectatic duct.  No obvious  intraductal masses are identified.  Impression  Finding 1:  Mass in the left breast is benign-negative.    Finding 2:  Area in the left breast requires additional evaluation. With  the given history of bloody nipple discharge, further evaluation with  galactogram or MRI may be helpful. Surgical consultation is also  recommended.    Routine follow-up mammogram in 1 year is recommended.    BI-RADS Category 0: Incomplete: Need Additional Evaluation   Menarche at 15 y/o   Misc 1  LMP: 40's  First pregnancy at 27 y/o  No hormone use and no history of radiation to the neck or chest wall     FH: negative for breast cancer     Pt takes plavix due to her history of CVA 10 years ago- has stopped it for a few days in the past for procedures with no problems.      Assessment & Plan:  1. Spontaneous bloody nipple discharge left breast nipple duct 9 oclock  2. Abnormal breast imaging- dilated duct.  3. Explained imaging results and clinical findings to the pt.  4. Recommend duct excision to rule out a papilloma that could be causing the spontaneous nipple discharge. Explained the procedure to the pt- usually outpatient- would have to stop her plavix prior to the procedure - ducts from under the nipple would be surgically removed and sent for path evaluation. Risk of bleeding, infection, blistering of the nipple, scarring, recurrence of discharge were discussed with the pt.    PATIENT SEEN AFTER My STARK. SHE HAS SPONTANEOUS NIPPLE DISCHARGE , BLOODY FROM A DUCT AT MEDIAL ,9OCLOCK , LEFT NIPPLE. THERE IS A DILATED SOMEWHAT THICKENED LEFT DUCT MEDIALLY GOING TO NIPPLE.   ReC: EXCISION DUCTS. PATIENT WAS COUNSELED. RISK OF BLEEDING , INFECTION, RECURRENCE, NIPPLE RETRACTION OR BLISTERING. TOLD HER ABOUT 10-15% CHANCE OF MALIGNANCY CAUSING THIS . SHE IS TO STOP PLAVIX 6 DAYS BEFORE.

## 2017-05-24 ENCOUNTER — LAB VISIT (OUTPATIENT)
Dept: LAB | Facility: HOSPITAL | Age: 67
End: 2017-05-24
Attending: SURGERY
Payer: MEDICARE

## 2017-05-24 ENCOUNTER — CLINICAL SUPPORT (OUTPATIENT)
Dept: CARDIOLOGY | Facility: CLINIC | Age: 67
End: 2017-05-24
Payer: MEDICARE

## 2017-05-24 DIAGNOSIS — N64.52 BLOODY DISCHARGE FROM LEFT NIPPLE: ICD-10-CM

## 2017-05-24 LAB
ALBUMIN SERPL BCP-MCNC: 3.7 G/DL
ALP SERPL-CCNC: 89 U/L
ALT SERPL W/O P-5'-P-CCNC: 11 U/L
ANION GAP SERPL CALC-SCNC: 9 MMOL/L
AST SERPL-CCNC: 16 U/L
BILIRUB SERPL-MCNC: 0.4 MG/DL
BUN SERPL-MCNC: 22 MG/DL
CALCIUM SERPL-MCNC: 9.8 MG/DL
CHLORIDE SERPL-SCNC: 105 MMOL/L
CO2 SERPL-SCNC: 26 MMOL/L
CREAT SERPL-MCNC: 1.2 MG/DL
EST. GFR  (AFRICAN AMERICAN): 54.4 ML/MIN/1.73 M^2
EST. GFR  (NON AFRICAN AMERICAN): 47.2 ML/MIN/1.73 M^2
GLUCOSE SERPL-MCNC: 92 MG/DL
POTASSIUM SERPL-SCNC: 3.7 MMOL/L
PROT SERPL-MCNC: 7.9 G/DL
SODIUM SERPL-SCNC: 140 MMOL/L

## 2017-05-24 PROCEDURE — 93000 ELECTROCARDIOGRAM COMPLETE: CPT | Mod: S$GLB,,, | Performed by: INTERNAL MEDICINE

## 2017-05-24 PROCEDURE — 80053 COMPREHEN METABOLIC PANEL: CPT

## 2017-05-24 PROCEDURE — 36415 COLL VENOUS BLD VENIPUNCTURE: CPT

## 2017-05-31 ENCOUNTER — ANESTHESIA EVENT (OUTPATIENT)
Dept: SURGERY | Facility: HOSPITAL | Age: 67
End: 2017-05-31
Payer: MEDICARE

## 2017-05-31 NOTE — PRE ADMISSION SCREENING
Pre op instructions reviewed with patient per phone:    To confirm, Your surgeon has instructed you:  Surgery is scheduled 06/01/17at 0700.      Please report to [facility] 1st floor main lobby by 0530  Pre admit office to call later today only if arrival time changes.      INSTRUCTIONS IMPORTANT!!!  ¨ Do not eat, drink, or smoke after 12 midnight-including water. OK to brush teeth, no gum, candy or mints!    ¨ Take only these medicines with a small swallow of water-morning of surgery.  Losartan, Atorvastatin      ____  Do not wear makeup, including mascara.  ____  No powder, lotions or creams to surgical area.  ____  Please remove all jewelry, including piercings and leave at home.  ____  No money or valuables needed. Please leave at home.  ____  Please bring identification and insurance information to hospital.  ____  If going home the same day, arrange for a ride home. You will not be able to   drive if Anesthesia was used.  ____  Children, under 12 years old, must remain in the waiting room with an adult.  They are not allowed in patient areas.  ____  Wear loose fitting clothing. Allow for dressings, bandages.  ____  Stop Aspirin, Ibuprofen, Motrin and Aleve at least 5-7 days before surgery, unless otherwise instructed by your doctor, or the nurse.   You MAY use Tylenol/acetaminophen until day of surgery.  ____  If you take diabetic medication, do not take am of surgery unless instructed by   Doctor.  ____ Stop taking any Fish Oil supplement or any Vitamins that contain Vitamin E at least 5 days prior to surgery.          Bathing Instructions-- The night before surgery and the morning prior to coming to the hospital:   -Do not shave the surgical area.   -Shower and wash your hair and body as usual with anti-bacterial  soap and shampoo.   -Rinse your hair and body completely.   -Use one packet of hibiclens to wash the surgical site (using your hand) gently for 5 minutes.  Do not scrub you  skin too hard.   -Do not use hibiclens on your head, face, or genitals.   -Do not wash with anti-bacterial soap after you use the hibiclens.   -Rinse your body thoroughly.   -Dry with clean, soft towel.  Do not use lotion, cream, deodorant, or powders on   the surgical site.    Use antibacterial soap in place of hibiclens if your surgery is on the head, face or genitals.         Surgical Site Infection    Prevention of surgical site infections:     -Keep incisions clean and dry.   -Do not soak/submerge incisions in water until completely healed.   -Do not apply lotions, powders, creams, or deodorants to site.   -Always make sure hands are cleaned with antibacterial soap/ alcohol-based   prior to touching the surgical site.  (This includes doctors, nurses, staff, and yourself.)    Signs and symptoms:   -Redness and pain around the area where you had surgery   -Drainage of cloudy fluid from your surgical wound   -Fever over 100.4  I have read or had read and explained to me, and understand the above information.

## 2017-06-01 ENCOUNTER — HOSPITAL ENCOUNTER (OUTPATIENT)
Facility: HOSPITAL | Age: 67
Discharge: HOME | End: 2017-06-01
Attending: SURGERY | Admitting: SURGERY
Payer: MEDICARE

## 2017-06-01 ENCOUNTER — ANESTHESIA (OUTPATIENT)
Dept: SURGERY | Facility: HOSPITAL | Age: 67
End: 2017-06-01
Payer: MEDICARE

## 2017-06-01 ENCOUNTER — SURGERY (OUTPATIENT)
Age: 67
End: 2017-06-01

## 2017-06-01 VITALS
HEART RATE: 59 BPM | TEMPERATURE: 98 F | SYSTOLIC BLOOD PRESSURE: 114 MMHG | BODY MASS INDEX: 30.73 KG/M2 | DIASTOLIC BLOOD PRESSURE: 61 MMHG | HEIGHT: 64 IN | OXYGEN SATURATION: 100 % | RESPIRATION RATE: 17 BRPM | WEIGHT: 180 LBS

## 2017-06-01 DIAGNOSIS — N64.52 BLOODY DISCHARGE FROM LEFT NIPPLE: ICD-10-CM

## 2017-06-01 PROCEDURE — 63600175 PHARM REV CODE 636 W HCPCS: Performed by: SURGERY

## 2017-06-01 PROCEDURE — 88305 TISSUE EXAM BY PATHOLOGIST: CPT | Mod: 26,,, | Performed by: PATHOLOGY

## 2017-06-01 PROCEDURE — 63600175 PHARM REV CODE 636 W HCPCS: Performed by: NURSE ANESTHETIST, CERTIFIED REGISTERED

## 2017-06-01 PROCEDURE — 36000706: Performed by: SURGERY

## 2017-06-01 PROCEDURE — 25000003 PHARM REV CODE 250: Performed by: NURSE ANESTHETIST, CERTIFIED REGISTERED

## 2017-06-01 PROCEDURE — 71000033 HC RECOVERY, INTIAL HOUR: Performed by: SURGERY

## 2017-06-01 PROCEDURE — 88305 TISSUE EXAM BY PATHOLOGIST: CPT | Performed by: PATHOLOGY

## 2017-06-01 PROCEDURE — 37000008 HC ANESTHESIA 1ST 15 MINUTES: Performed by: SURGERY

## 2017-06-01 PROCEDURE — 71000015 HC POSTOP RECOV 1ST HR: Performed by: SURGERY

## 2017-06-01 PROCEDURE — 36000707: Performed by: SURGERY

## 2017-06-01 PROCEDURE — 37000009 HC ANESTHESIA EA ADD 15 MINS: Performed by: SURGERY

## 2017-06-01 PROCEDURE — 25000003 PHARM REV CODE 250: Performed by: ANESTHESIOLOGY

## 2017-06-01 PROCEDURE — 25000003 PHARM REV CODE 250: Performed by: SURGERY

## 2017-06-01 PROCEDURE — 19110 NIPPLE EXPLORATION: CPT | Mod: LT,,, | Performed by: SURGERY

## 2017-06-01 RX ORDER — MORPHINE SULFATE 10 MG/ML
2 INJECTION INTRAMUSCULAR; INTRAVENOUS; SUBCUTANEOUS EVERY 5 MIN PRN
Status: DISCONTINUED | OUTPATIENT
Start: 2017-06-01 | End: 2017-06-01 | Stop reason: HOSPADM

## 2017-06-01 RX ORDER — FENTANYL CITRATE 50 UG/ML
INJECTION, SOLUTION INTRAMUSCULAR; INTRAVENOUS
Status: DISCONTINUED | OUTPATIENT
Start: 2017-06-01 | End: 2017-06-01

## 2017-06-01 RX ORDER — MEPERIDINE HYDROCHLORIDE 50 MG/ML
12.5 INJECTION INTRAMUSCULAR; INTRAVENOUS; SUBCUTANEOUS ONCE AS NEEDED
Status: DISCONTINUED | OUTPATIENT
Start: 2017-06-01 | End: 2017-06-01 | Stop reason: HOSPADM

## 2017-06-01 RX ORDER — PROPOFOL 10 MG/ML
VIAL (ML) INTRAVENOUS
Status: DISCONTINUED | OUTPATIENT
Start: 2017-06-01 | End: 2017-06-01

## 2017-06-01 RX ORDER — BUPIVACAINE HYDROCHLORIDE AND EPINEPHRINE 2.5; 5 MG/ML; UG/ML
INJECTION, SOLUTION EPIDURAL; INFILTRATION; INTRACAUDAL; PERINEURAL
Status: DISCONTINUED | OUTPATIENT
Start: 2017-06-01 | End: 2017-06-01 | Stop reason: HOSPADM

## 2017-06-01 RX ORDER — ONDANSETRON 8 MG/1
8 TABLET, ORALLY DISINTEGRATING ORAL EVERY 8 HOURS PRN
Status: DISCONTINUED | OUTPATIENT
Start: 2017-06-01 | End: 2017-06-01 | Stop reason: HOSPADM

## 2017-06-01 RX ORDER — LIDOCAINE HYDROCHLORIDE 10 MG/ML
INJECTION INFILTRATION; PERINEURAL
Status: DISCONTINUED | OUTPATIENT
Start: 2017-06-01 | End: 2017-06-01

## 2017-06-01 RX ORDER — CEFAZOLIN SODIUM 2 G/50ML
2 SOLUTION INTRAVENOUS
Status: COMPLETED | OUTPATIENT
Start: 2017-06-01 | End: 2017-06-01

## 2017-06-01 RX ORDER — SODIUM CHLORIDE 9 MG/ML
3 INJECTION, SOLUTION INTRAMUSCULAR; INTRAVENOUS; SUBCUTANEOUS
Status: DISCONTINUED | OUTPATIENT
Start: 2017-06-01 | End: 2017-06-01 | Stop reason: HOSPADM

## 2017-06-01 RX ORDER — HYDROCODONE BITARTRATE AND ACETAMINOPHEN 5; 325 MG/1; MG/1
1 TABLET ORAL EVERY 4 HOURS PRN
Status: DISCONTINUED | OUTPATIENT
Start: 2017-06-01 | End: 2017-06-01 | Stop reason: HOSPADM

## 2017-06-01 RX ORDER — MIDAZOLAM HYDROCHLORIDE 1 MG/ML
INJECTION, SOLUTION INTRAMUSCULAR; INTRAVENOUS
Status: DISCONTINUED | OUTPATIENT
Start: 2017-06-01 | End: 2017-06-01

## 2017-06-01 RX ORDER — HYDROCODONE BITARTRATE AND ACETAMINOPHEN 5; 325 MG/1; MG/1
1 TABLET ORAL EVERY 6 HOURS PRN
Qty: 12 TABLET | Refills: 0 | Status: SHIPPED | OUTPATIENT
Start: 2017-06-01 | End: 2017-06-09

## 2017-06-01 RX ORDER — SODIUM CHLORIDE 9 MG/ML
INJECTION, SOLUTION INTRAVENOUS CONTINUOUS
Status: DISCONTINUED | OUTPATIENT
Start: 2017-06-01 | End: 2017-06-01 | Stop reason: HOSPADM

## 2017-06-01 RX ORDER — OXYCODONE HYDROCHLORIDE 5 MG/1
5 TABLET ORAL
Status: DISCONTINUED | OUTPATIENT
Start: 2017-06-01 | End: 2017-06-01 | Stop reason: HOSPADM

## 2017-06-01 RX ORDER — SODIUM CHLORIDE, SODIUM LACTATE, POTASSIUM CHLORIDE, CALCIUM CHLORIDE 600; 310; 30; 20 MG/100ML; MG/100ML; MG/100ML; MG/100ML
INJECTION, SOLUTION INTRAVENOUS CONTINUOUS
Status: DISCONTINUED | OUTPATIENT
Start: 2017-06-01 | End: 2017-06-01 | Stop reason: HOSPADM

## 2017-06-01 RX ADMIN — PROPOFOL 20 MG: 10 INJECTION, EMULSION INTRAVENOUS at 08:06

## 2017-06-01 RX ADMIN — MIDAZOLAM HYDROCHLORIDE 1 MG: 1 INJECTION, SOLUTION INTRAMUSCULAR; INTRAVENOUS at 08:06

## 2017-06-01 RX ADMIN — LIDOCAINE HYDROCHLORIDE 30 ML: 10; .005 INJECTION, SOLUTION EPIDURAL; INFILTRATION; INTRACAUDAL; PERINEURAL at 08:06

## 2017-06-01 RX ADMIN — SODIUM CHLORIDE, SODIUM LACTATE, POTASSIUM CHLORIDE, AND CALCIUM CHLORIDE: 600; 310; 30; 20 INJECTION, SOLUTION INTRAVENOUS at 08:06

## 2017-06-01 RX ADMIN — PROPOFOL 20 MG: 10 INJECTION, EMULSION INTRAVENOUS at 09:06

## 2017-06-01 RX ADMIN — PROPOFOL 30 MG: 10 INJECTION, EMULSION INTRAVENOUS at 08:06

## 2017-06-01 RX ADMIN — PROPOFOL 40 MG: 10 INJECTION, EMULSION INTRAVENOUS at 08:06

## 2017-06-01 RX ADMIN — CEFAZOLIN SODIUM 2 G: 2 SOLUTION INTRAVENOUS at 08:06

## 2017-06-01 RX ADMIN — FENTANYL CITRATE 50 MCG: 50 INJECTION, SOLUTION INTRAMUSCULAR; INTRAVENOUS at 08:06

## 2017-06-01 RX ADMIN — LIDOCAINE HYDROCHLORIDE 40 MG: 10 INJECTION, SOLUTION INFILTRATION; PERINEURAL at 08:06

## 2017-06-01 RX ADMIN — BUPIVACAINE HYDROCHLORIDE AND EPINEPHRINE BITARTRATE 30 ML: 2.5; .0091 INJECTION, SOLUTION EPIDURAL; INFILTRATION; INTRACAUDAL; PERINEURAL at 08:06

## 2017-06-01 NOTE — H&P (VIEW-ONLY)
HPI: Patient is referred by her PCP for the evaluation of bloody spontaneous nipple discharge that began about one week ago. Initially noted clear nipple discharge on her underclothes - was never bloody until went to her PCP- was noted to be blood at that time and has had one episode of blood on her underclothes since.      Review of Systems   Constitutional: Negative. Negative for activity change and unexpected weight change.   HENT: Negative. Negative for hearing loss, rhinorrhea and trouble swallowing.   Eyes: Negative. Negative for discharge and visual disturbance.   Respiratory: Negative. Negative for chest tightness and wheezing.   Cardiovascular: Negative. Negative for chest pain and palpitations.   Gastrointestinal: Negative. Negative for blood in stool, constipation, diarrhea and vomiting.   Endocrine: Negative. Negative for polydipsia and polyuria.   Genitourinary: Negative. Negative for difficulty urinating, dysuria, hematuria and menstrual problem.   Musculoskeletal: Positive for gait problem. Negative for arthralgias and joint swelling.   Has right sided weakness from CVA 10 years ago   Skin: Negative.   Allergic/Immunologic: Negative.   Neurological: Negative for weakness and headaches.   Hematological: Negative.   Psychiatric/Behavioral: Negative. Negative for confusion and dysphoric mood.   Breast: pt denies any previous breast problems, no breast trauma, no breast pain and no history of breast discharge before recently. Denies any change in her medications.       Current Medications           Current Outpatient Prescriptions   Medication Sig Dispense Refill    atorvastatin (LIPITOR) 20 MG tablet Take 1 tablet (20 mg total) by mouth once daily. 90 tablet 3    clopidogrel (PLAVIX) 75 mg tablet Take 1 tablet (75 mg total) by mouth once daily. 90 tablet 3    hydrochlorothiazide (HYDRODIURIL) 12.5 MG Tab Take 1 tablet (12.5 mg total) by mouth once daily. 90 tablet 3    losartan (COZAAR) 100 MG tablet  Take 1 tablet (100 mg total) by mouth once daily. 90 tablet 3      No current facility-administered medications for this visit.             Review of patient's allergies indicates:  No Known Allergies          Past Medical History:   Diagnosis Date    CVA (cerebral vascular accident) 2008     R hemiparesis, R hand contracture    HTN (hypertension)      Hyperlipidemia                 Past Surgical History:   Procedure Laterality Date    HYSTERECTOMY         partial     TONSILLECTOMY                   Family History   Problem Relation Age of Onset    Hypertension Mother      Hypertension Sister      Stroke Brother      Cancer Neg Hx            Social History    Social History            Social History    Marital status:        Spouse name: N/A    Number of children: 1    Years of education: N/A           Occupational History      Not Employ              Social History Main Topics    Smoking status: Never Smoker    Smokeless tobacco: Not on file    Alcohol use 0.0 oz/week        0 Standard drinks or equivalent per week          Comment: occ    Drug use: No    Sexual activity: Yes       Partners: Male           Other Topics Concern    Not on file      Social History Narrative                Vitals:     05/10/17 1451   BP: 124/62   Resp: 18   Temp: 97.5 °F (36.4 °C)         Physical Exam   Constitutional: She is oriented to person, place, and time. She appears well-developed and well-nourished.   HENT:   Head: Normocephalic and atraumatic.   Right Ear: External ear normal.   Left Ear: External ear normal.   Eyes: Conjunctivae and EOM are normal. Pupils are equal, round, and reactive to light. Right eye exhibits no discharge. Left eye exhibits no discharge. No scleral icterus.   Neck: Normal range of motion. Neck supple. No thyromegaly present.   Cardiovascular: Normal rate and regular rhythm.   Pulmonary/Chest: Effort normal and breath sounds normal. Right breast exhibits no inverted nipple, no  mass, no nipple discharge, no skin change and no tenderness. Left breast exhibits nipple discharge. Left breast exhibits no inverted nipple, no mass, no skin change and no tenderness.       Abdominal: Soft. Bowel sounds are normal.   Musculoskeletal: Normal range of motion.   Lymphadenopathy:   Head (right side): No submental, no submandibular, no tonsillar, no preauricular, no posterior auricular and no occipital adenopathy present.   Head (left side): No submental, no submandibular, no tonsillar, no preauricular, no posterior auricular and no occipital adenopathy present.   She has no cervical adenopathy.   Right cervical: No superficial cervical and no posterior cervical adenopathy present.  Left cervical: No superficial cervical and no posterior cervical adenopathy present.   She has no axillary adenopathy.   Right: No supraclavicular adenopathy present.   Left: No supraclavicular adenopathy present.   Neurological: She is alert and oriented to person, place, and time.   Skin: Skin is warm. No erythema.   Psychiatric: She has a normal mood and affect. Her behavior is normal. Judgment and thought content normal.   Nursing note and vitals reviewed.     [id number]:  MAMMO DIGITAL DIAGNOSTIC LEFT WITH TOMOSYNTHESIS_CAD  Views: left craniocaudal with tomosynthesis; left mediolateral with  tomosynthesis; left mediolateral oblique with tomosynthesis    There are scattered fibroglandular densities.    Finding 1:  There is a round mass measuring 4 millimeters with  circumscribed margins seen in the left breast at 2 o'clock.  Digital tomosynthesis was performed and used in the interpretation of the  images.  Images were evaluated with a Computer Aided Detection (CAD) system.    LEFT LIMITED ULTRASOUND FINDINGS:  High-resolution real-time ultrasound scanning was performed.    Finding 1:  The 2 o'clock lesion within the left breast is most consistent  with a simple cyst.    Finding 2:  Ultrasound evaluation of the  subareolar region of the left  breast demonstrates a single dilated mildly ectatic duct.  No obvious  intraductal masses are identified.  Impression  Finding 1:  Mass in the left breast is benign-negative.    Finding 2:  Area in the left breast requires additional evaluation. With  the given history of bloody nipple discharge, further evaluation with  galactogram or MRI may be helpful. Surgical consultation is also  recommended.    Routine follow-up mammogram in 1 year is recommended.    BI-RADS Category 0: Incomplete: Need Additional Evaluation   Menarche at 17 y/o   Misc 1  LMP: 40's  First pregnancy at 29 y/o  No hormone use and no history of radiation to the neck or chest wall     FH: negative for breast cancer     Pt takes plavix due to her history of CVA 10 years ago- has stopped it for a few days in the past for procedures with no problems.      Assessment & Plan:  1. Spontaneous bloody nipple discharge left breast nipple duct 9 oclock  2. Abnormal breast imaging- dilated duct.  3. Explained imaging results and clinical findings to the pt.  4. Recommend duct excision to rule out a papilloma that could be causing the spontaneous nipple discharge. Explained the procedure to the pt- usually outpatient- would have to stop her plavix prior to the procedure - ducts from under the nipple would be surgically removed and sent for path evaluation. Risk of bleeding, infection, blistering of the nipple, scarring, recurrence of discharge were discussed with the pt.    PATIENT SEEN AFTER My STARK. SHE HAS SPONTANEOUS NIPPLE DISCHARGE , BLOODY FROM A DUCT AT MEDIAL ,9OCLOCK , LEFT NIPPLE. THERE IS A DILATED SOMEWHAT THICKENED LEFT DUCT MEDIALLY GOING TO NIPPLE.   ReC: EXCISION DUCTS. PATIENT WAS COUNSELED. RISK OF BLEEDING , INFECTION, RECURRENCE, NIPPLE RETRACTION OR BLISTERING. TOLD HER ABOUT 10-15% CHANCE OF MALIGNANCY CAUSING THIS . SHE IS TO STOP PLAVIX 6 DAYS BEFORE.

## 2017-06-01 NOTE — TRANSFER OF CARE
"Anesthesia Transfer of Care Note    Patient: Tracy Najera    Procedure(s) Performed: Procedure(s) (LRB):  BIOPSY-BREAST excision ducts (Left)    Patient location: PACU    Anesthesia Type: MAC    Transport from OR: Transported from OR on room air with adequate spontaneous ventilation    Post pain: adequate analgesia    Post assessment: no apparent anesthetic complications    Post vital signs: stable    Level of consciousness: awake    Nausea/Vomiting: no nausea/vomiting    Complications: none    Transfer of care protocol was followed      Last vitals:   Visit Vitals  /69 (BP Location: Left arm, Patient Position: Lying, BP Method: Automatic)   Pulse 85   Temp 36.9 °C (98.4 °F) (Oral)   Resp 18   Ht 5' 4" (1.626 m)   Wt 81.6 kg (180 lb)   SpO2 97%   Breastfeeding? No   BMI 30.90 kg/m²     "

## 2017-06-01 NOTE — ANESTHESIA PREPROCEDURE EVALUATION
06/01/2017  Tracy Najera is a 66 y.o., female.    Anesthesia Evaluation    I have reviewed the Patient Summary Reports.        Review of Systems  Anesthesia Hx:  No previous Anesthesia    Cardiovascular:   Exercise tolerance: good Hypertension ECG has been reviewed. EKG 5/24: NSR Hypertension    Neurological:   CVA (right arm motor weakness), residual symptoms  CVA - Cerebrovasular Accident , has had 1 stroke , residual deficits are hemiparesis - right.        Physical Exam  General:  Obesity    Airway/Jaw/Neck:  Airway Findings: Mallampati: II TM Distance: 4 - 6 cm       Chest/Lungs:  Chest/Lungs Findings: Clear to auscultation     Heart/Vascular:  Heart Findings: Rate: Normal  Sounds: Normal      Musculoskeletal:  Musculoskeletal Findings:     Mental Status:  Mental Status Findings:  Cooperative, Alert and Oriented         Anesthesia Plan  Type of Anesthesia, risks & benefits discussed:  Anesthesia Type:  MAC  Patient's Preference:   Intra-op Monitoring Plan:   Intra-op Monitoring Plan Comments:   Post Op Pain Control Plan: IV/PO Opiods PRN  Post Op Pain Control Plan Comments:   Induction:    Beta Blocker:  Patient is not currently on a Beta-Blocker (No further documentation required).       Informed Consent: Patient understands risks and agrees with Anesthesia plan.  Questions answered.   ASA Score: 3     Day of Surgery Review of History & Physical: I have interviewed and examined the patient. I have reviewed the patient's H&P dated:            Ready For Surgery From Anesthesia Perspective.

## 2017-06-01 NOTE — DISCHARGE INSTRUCTIONS
General Information:    1.  Do not drink alcoholic beverages including beer for 24 hours or as long as you are on pain medication..  2.  Do not drive a motor vehicle, operate machinery or power tools, or signs legal papers for 24 hours or as long as you are on pain medication.   3.  You may experience light-headedness, dizziness, and sleepiness following surgery. Please do not stay alone. A responsible adult should be with you for this 24 hour period.  4.  Go home and rest.    5. Progress slowly to a normal diet unless instructed.  Otherwise, begin with liquids such as soft drinks, then soup and crackers working up to solid foods. Drink plenty of nonalcoholic fluids.  6.  Certain anesthetics and pain medications produce nausea and vomiting in certain       individuals. If nausea becomes a problem at home, call you doctor.    7. A nurse will be calling you sometime after surgery. Do not be alarmed. This is our way of finding out how you are doing.    8. Several times every hour while you are awake, take 2-3 deep breaths and cough. If you had stomach surgery hold a pillow or rolled towel firmly against your stomach before you cough. This will help with any pain the cough might cause.  9. Several times every hour while you are awake, pump and flex your feet 5-6 times and do foot circles. This will help prevent blood clots.    10.Call your doctor for severe pain, bleeding, fever, or signs or symptoms of infection (pain, swelling, redness, foul odor, drainage).    11.You can contact your doctor anytime by [phone] for the [facility] (at [facility]) or [phone] for the [facility] on [facility].   [email or web address] is another way to contact your doctor if you are an active participant online with [facility].        Percutaneous Breast Biopsy    Percutaneous breast biopsies are done by putting a needle or probe through the skin of the breast to remove tissue for testing. This type of biopsy  can be done in a doctors office or in an outpatient setting. The needle or special probe is put through the skin of the breast to remove samples tissue that might be cancer. Once removed, the biopsy sample is sent to a lab for study. If a lump or breast change cannot be felt, an image-guided biopsy is done. In these cases, the breast change may be located using ultrasound guidance. Computer mapping, based on mammograms, can also help pinpoint breast changes and guide the needle to the right place. The type of percutaneous breast biopsy done depends on the size of the lump or breast change, where it is in the breast, how much it looks like cancer, other medical problems you may have, and your preferences. Be sure to talk to your doctor about the pros and cons of the type of biopsy that's best for you and what you can expect during and after the biopsy.    Understanding the risks  Before the biopsy, your doctor will talk with you about the risks of the procedure:  · There may be slight bruising or bleeding where the needle or probe is put through the skin.  · There's a small risk of infection.  · All percutaneous biopsies may provide a false-negative result. This means you may have cancer cells that dont show up in the biopsy sample. If the results arent clear, you will likely need to have another type of biopsy; often one that removes more tissue.  During the biopsy  Percutaneous biopsies are usually done under local anesthesia. This means drugs are used to numb the area of the breast that the needle will be put into. There are several types of percutaneous breast biopsy procedures. The type of biopsy done will depend on the location and size of the breast lump or change. Here are brief descriptions of the most comon types:  · During a fine needle aspiration, a very thin needle is placed into the lump, and tissue or fluid is pulled into needle. No cut is needed. This type of biopsy takes only minutes to  perform.  · With a core needle biopsy, a larger needle is used to take out small cylinders or cores of tissue. The needle is inserted for each sample. No cut is needed.  · With vacuum-assisted biopsy, the tube-like probe often is inserted only once. The breast tissue is gently suctioned into the tube, and a small rotating knife inside the tube removes the tissue. A small cut in the skin may be needed to put in the larger, hollow probe.  When needed, any of these biopsies can be image-guided.  After the biopsy  You can go home shortly after the biopsy, no matter which method is used. And you can return to your normal routine almost right away. You may have some bruising and swelling for a few days. Be sure you know what signs of problems are and when to call the doctor. Sometimes a small, freckle-like scar appears.  Ask your doctor when you can expect to know the results of your biopsy and how you will get them.  Date Last Reviewed: 10/31/2015  © 5543-3023 [employer]. [address] [place] [address]. All rights reserved. This information is not intended as a substitute for professional medical care. Always follow your healthcare professional's instructions.        Acetaminophen; Hydrocodone tablets or capsules  What is this medicine?  ACETAMINOPHEN; HYDROCODONE (a set a BARRETT georgi fen; dawit droe KOE done) is a pain reliever. It is used to treat moderate to severe pain.  How should I use this medicine?  Take this medicine by mouth with a glass of water. Follow the directions on the prescription label. You can take it with or without food. If it upsets your stomach, take it with food. Do not take your medicine more often than directed.  A special MedGuide will be given to you by the pharmacist with each prescription and refill. Be sure to read this information carefully each time.  Talk to your pediatrician regarding the use of this medicine in children. Special care may be needed.  What side  effects may I notice from receiving this medicine?  Side effects that you should report to your doctor or health care professional as soon as possible:  · allergic reactions like skin rash, itching or hives, swelling of the face, lips, or tongue  · breathing problems  · confusion  · redness, blistering, peeling or loosening of the skin, including inside the mouth  · signs and symptoms of low blood pressure like dizziness; feeling faint or lightheaded, falls; unusually weak or tired  · trouble passing urine or change in the amount of urine  · yellowing of the eyes or skin  Side effects that usually do not require medical attention (report to your doctor or health care professional if they continue or are bothersome):  · constipation  · dry mouth  · nausea, vomiting  · tiredness  What may interact with this medicine?  This medicine may interact with the following medications:  · alcohol  · antiviral medicines for HIV or AIDS  · atropine  · antihistamines for allergy, cough and cold  · certain antibiotics like erythromycin, clarithromycin  · certain medicines for anxiety or sleep  · certain medicines for bladder problems like oxybutynin, tolterodine  · certain medicines for depression like amitriptyline, fluoxetine, sertraline  · certain medicines for fungal infections like ketoconazole and itraconazole  · certain medicines for Parkinson's disease like benztropine, trihexyphenidyl  · certain medicines for seizures like carbamazepine, phenobarbital, phenytoin, primidone  · certain medicines for stomach problems like dicyclomine, hyoscyamine  · certain medicines for travel sickness like scopolamine  · general anesthetics like halothane, isoflurane, methoxyflurane, propofol  · ipratropium  · local anesthetics like lidocaine, pramoxine, tetracaine  · MAOIs like Carbex, Eldepryl, Marplan, Nardil, and Parnate  · medicines that relax muscles for surgery  · other medicines with acetaminophen  · other narcotic medicines for  pain or cough  · phenothiazines like chlorpromazine, mesoridazine, prochlorperazine, thioridazine  · rifampin  What if I miss a dose?  If you miss a dose, take it as soon as you can. If it is almost time for your next dose, take only that dose. Do not take double or extra doses.  Where should I keep my medicine?  Keep out of the reach of children. This medicine can be abused. Keep your medicine in a safe place to protect it from theft. Do not share this medicine with anyone. Selling or giving away this medicine is dangerous and against the law.  This medicine may cause accidental overdose and death if it taken by other adults, children, or pets. Mix any unused medicine with a substance like cat litter or coffee grounds. Then throw the medicine away in a sealed container like a sealed bag or a coffee can with a lid. Do not use the medicine after the expiration date.  Store at room temperature between 15 and 30 degrees C (59 and 86 degrees F).  What should I tell my health care provider before I take this medicine?  They need to know if you have any of these conditions:  · brain tumor  · Crohn's disease, inflammatory bowel disease, or ulcerative colitis  · drug abuse or addiction  · head injury  · heart or circulation problems  · if you often drink alcohol  · kidney disease or problems going to the bathroom  · liver disease  · lung disease, asthma, or breathing problems  · an unusual or allergic reaction to acetaminophen, hydrocodone, other opioid analgesics, other medicines, foods, dyes, or preservatives  · pregnant or trying to get pregnant  · breast-feeding  What should I watch for while using this medicine?  Tell your doctor or health care professional if your pain does not go away, if it gets worse, or if you have new or a different type of pain. You may develop tolerance to the medicine. Tolerance means that you will need a higher dose of the medicine for pain relief. Tolerance is normal and is expected if you  take the medicine for a long time.  Do not suddenly stop taking your medicine because you may develop a severe reaction. Your body becomes used to the medicine. This does NOT mean you are addicted. Addiction is a behavior related to getting and using a drug for a non-medical reason. If you have pain, you have a medical reason to take pain medicine. Your doctor will tell you how much medicine to take. If your doctor wants you to stop the medicine, the dose will be slowly lowered over time to avoid any side effects.  There are different types of narcotic medicines (opiates). If you take more than one type at the same time or if you are taking another medicine that also causes drowsiness, you may have more side effects. Give your health care provider a list of all medicines you use. Your doctor will tell you how much medicine to take. Do not take more medicine than directed. Call emergency for help if you have problems breathing or unusual sleepiness.  Do not take other medicines that contain acetaminophen with this medicine. Always read labels carefully. If you have questions, ask your doctor or pharmacist.  If you take too much acetaminophen get medical help right away. Too much acetaminophen can be very dangerous and cause liver damage. Even if you do not have symptoms, it is important to get help right away.  You may get drowsy or dizzy. Do not drive, use machinery, or do anything that needs mental alertness until you know how this medicine affects you. Do not stand or sit up quickly, especially if you are an older patient. This reduces the risk of dizzy or fainting spells. Alcohol may interfere with the effect of this medicine. Avoid alcoholic drinks.  The medicine will cause constipation. Try to have a bowel movement at least every 2 to 3 days. If you do not have a bowel movement for 3 days, call your doctor or health care professional.  Your mouth may get dry. Chewing sugarless gum or sucking hard candy, and  drinking plenty of water may help. Contact your doctor if the problem does not go away or is severe.  Date Last Reviewed:   NOTE:This sheet is a summary. It may not cover all possible information. If you have questions about this medicine, talk to your doctor, pharmacist, or health care provider. Copyright© 2016 [employer]

## 2017-06-01 NOTE — PLAN OF CARE
Pt resting on stretcher s/p lt breast biopsy performed under MAC anesthesia. Respirations even and unlabored on room air and tolerating well. Lt breast dsg remains c/d/i. VSS. See flow sheet for detailed assessment. Will cont to monitor.

## 2017-06-01 NOTE — ANESTHESIA POSTPROCEDURE EVALUATION
"Anesthesia Post Evaluation    Patient: Tracy Najera    Procedure(s) Performed: Procedure(s) (LRB):  BIOPSY-BREAST excision ducts (Left)    Final Anesthesia Type: MAC  Patient location during evaluation: PACU  Patient participation: Yes- Able to Participate  Level of consciousness: awake and alert  Post-procedure vital signs: reviewed and stable  Pain management: adequate  Airway patency: patent  PONV status at discharge: No PONV  Anesthetic complications: no      Cardiovascular status: blood pressure returned to baseline  Respiratory status: unassisted  Hydration status: euvolemic  Follow-up not needed.        Visit Vitals  /61   Pulse (!) 59   Temp 36.7 °C (98.1 °F) (Temporal)   Resp 17   Ht 5' 4" (1.626 m)   Wt 81.6 kg (180 lb)   SpO2 100%   Breastfeeding? No   BMI 30.90 kg/m²       Pain/Cooper Score: Pain Assessment Performed: Yes (6/1/2017 10:15 AM)  Presence of Pain: denies (6/1/2017 10:15 AM)  Cooper Score: 9 (6/1/2017 10:15 AM)      "

## 2017-06-01 NOTE — OP NOTE
DATE OF PROCEDURE:  06/01/2017.    PREOPERATIVE DIAGNOSIS:  Bloody left nipple discharge coming from single duct.    POSTOPERATIVE DIAGNOSIS:  Bloody left nipple discharge coming from single duct.    OPERATIVE PROCEDURE:  Left breast excisional biopsy of ducts right under the   nipple and deep to the nipple.    SURGEON:  Bernard John M.D.    ESTIMATED BLOOD LOSS:  5 mL.    FLUIDS RECEIVED:  650 mL of crystalloid.    ANESTHESIA:  MAC, that is IV sedation plus 20 mL of 1% lidocaine with   epinephrine and 8 mL of 0.25% Marcaine with epinephrine.    OPERATIVE FINDINGS:  There was a dilated duct in the medial aspect of the left   nipple, which was basically dilated all the way up to the nipple skin.  This was   sent for permanent section.    OPERATIVE PROCEDURE IN DETAIL:  After adequate IV sedation was obtained in   supine position, the patient's skin and subcutaneous tissue around the nipple   and areola was injected with local anesthetic.  An incision was made,   curvilinear, on the inferior medial aspect of the areola.  It was carried   through the skin using knife, through subcutaneous tissue using cautery, down to   the breast parenchyma using careful blunt dissection and cautery, the areolar   skin was elevated.  The ducts attached to the nipple were noted.  After this was   done, I flushed the nipple.  Both ducts were transected.  The deep portion of   the ducts was grasped with an Allis.  At the nipple edge, the remainder of the   ducts were shaved off of flush with the skin and dilated duct could be seen.    This was sent for permanent section.  After this was done, the ducts deep to the   nipple were excised.  A wedge-type incision was made using cautery and abnormal   ducts were removed.  After this was done, the wound was irrigated.  No bleeding   was noted.  The deep breast parenchyma was closed using interrupted 2-0 Vicryl.    Superficial suture was placed just deep to the nipple to keep it from    recessing.  The subcutaneous tissue was closed using running 3-0 Vicryl, skin   was closed using running 4-0 Vicryl subcuticular.  Steri-Strips were applied,   fluff, 4 x 4s and tape.  The patient was taken to Recovery Room in stable   condition.          /hayder [id number] blank(s)        PARMINDER/  dd: 06/01/2017 09:59:03 (CDT)  td: 06/01/2017 13:07:39 (CDT)  Doc ID   #[id number]  Job ID #[id number]    CC: [facility] [employer]

## 2017-06-09 ENCOUNTER — OFFICE VISIT (OUTPATIENT)
Dept: SURGERY | Facility: CLINIC | Age: 67
End: 2017-06-09
Payer: MEDICARE

## 2017-06-09 VITALS
WEIGHT: 179.69 LBS | DIASTOLIC BLOOD PRESSURE: 76 MMHG | BODY MASS INDEX: 30.84 KG/M2 | HEART RATE: 68 BPM | SYSTOLIC BLOOD PRESSURE: 132 MMHG | TEMPERATURE: 99 F

## 2017-06-09 DIAGNOSIS — Z98.890 POST-OPERATIVE STATE: Primary | ICD-10-CM

## 2017-06-09 PROCEDURE — 99024 POSTOP FOLLOW-UP VISIT: CPT | Mod: S$GLB,,, | Performed by: SURGERY

## 2017-06-09 PROCEDURE — 99999 PR PBB SHADOW E&M-EST. PATIENT-LVL III: CPT | Mod: PBBFAC,,, | Performed by: SURGERY

## 2017-06-09 NOTE — PROGRESS NOTES
PATIENT RETURNS NOW 8 DAYS POST EXCISION DUCTS UNDER LEFT NIPPLE FOR BLOODY NIPPLE DRAINAGE. SHE FEELS WELL WITH NO PAIN AND IS BACK AT REGULAR ACTIVITIES  O: VSS, ALERT ORIENTED, LEFT BREAST AREOLAR INCISION IS CLEAN AND DRY HEALING WELL. THE NIPPLE APPEARS NORMAL .  PATH: SHOWS INTRADUCTAL PAPILLOMA.  IMP: DOING WELL NOW POST EXCISION PAPILLOMA LEFT BREAST CAUSING NIPPLE BLEEDING  P: RECHECK IN 6 MONTHS WITH PE AND WILL BE DUE FOR MAMMOGRAM THEN

## 2017-10-05 DIAGNOSIS — I69.30 HISTORY OF CVA WITH RESIDUAL DEFICIT: ICD-10-CM

## 2017-10-05 RX ORDER — CLOPIDOGREL BISULFATE 75 MG/1
75 TABLET ORAL DAILY
Qty: 90 TABLET | Refills: 3 | Status: SHIPPED | OUTPATIENT
Start: 2017-10-05 | End: 2018-05-17 | Stop reason: SDUPTHER

## 2017-10-06 RX ORDER — ATORVASTATIN CALCIUM 20 MG/1
20 TABLET, FILM COATED ORAL DAILY
Qty: 90 TABLET | Refills: 3 | Status: SHIPPED | OUTPATIENT
Start: 2017-10-06 | End: 2018-05-17 | Stop reason: SDUPTHER

## 2017-11-30 RX ORDER — LOSARTAN POTASSIUM 100 MG/1
100 TABLET ORAL DAILY
Qty: 90 TABLET | Refills: 3 | Status: SHIPPED | OUTPATIENT
Start: 2017-11-30 | End: 2018-05-17 | Stop reason: SDUPTHER

## 2018-01-06 DIAGNOSIS — I10 ESSENTIAL HYPERTENSION: ICD-10-CM

## 2018-01-06 DIAGNOSIS — R60.0 EDEMA EXTREMITIES: ICD-10-CM

## 2018-01-08 RX ORDER — HYDROCHLOROTHIAZIDE 12.5 MG/1
TABLET ORAL
Qty: 90 TABLET | Refills: 1 | Status: SHIPPED | OUTPATIENT
Start: 2018-01-08 | End: 2018-05-17 | Stop reason: SDUPTHER

## 2018-02-02 ENCOUNTER — OFFICE VISIT (OUTPATIENT)
Dept: SURGERY | Facility: CLINIC | Age: 68
End: 2018-02-02
Payer: MEDICARE

## 2018-02-02 ENCOUNTER — HOSPITAL ENCOUNTER (OUTPATIENT)
Dept: RADIOLOGY | Facility: HOSPITAL | Age: 68
Discharge: HOME | End: 2018-02-02
Attending: SURGERY
Payer: MEDICARE

## 2018-02-02 VITALS
DIASTOLIC BLOOD PRESSURE: 69 MMHG | HEART RATE: 74 BPM | WEIGHT: 186.31 LBS | SYSTOLIC BLOOD PRESSURE: 116 MMHG | BODY MASS INDEX: 31.98 KG/M2 | TEMPERATURE: 98 F

## 2018-02-02 DIAGNOSIS — Z12.39 SCREENING BREAST EXAMINATION: ICD-10-CM

## 2018-02-02 DIAGNOSIS — Z12.39 SCREENING BREAST EXAMINATION: Primary | ICD-10-CM

## 2018-02-02 PROCEDURE — 77067 SCR MAMMO BI INCL CAD: CPT | Mod: TC

## 2018-02-02 PROCEDURE — 77063 BREAST TOMOSYNTHESIS BI: CPT | Mod: 26,,, | Performed by: RADIOLOGY

## 2018-02-02 PROCEDURE — 77067 SCR MAMMO BI INCL CAD: CPT | Mod: 26,,, | Performed by: RADIOLOGY

## 2018-02-02 PROCEDURE — 99211 OFF/OP EST MAY X REQ PHY/QHP: CPT | Mod: S$GLB,,, | Performed by: SURGERY

## 2018-02-02 PROCEDURE — 99999 PR PBB SHADOW E&M-EST. PATIENT-LVL III: CPT | Mod: PBBFAC,,, | Performed by: SURGERY

## 2018-02-02 NOTE — PROGRESS NOTES
Patient returns now on follow-up.  Proximally 7 months ago she underwent excision of the ducts underneath her left nipple and areola.  The excision was done due to persistent bloody nipple discharge.  A benign papilloma was found on pathology.  Since the surgery she has had no breast complaints.  She denies any problems with the left nipple and areola and has not had any further nipple discharge from either right or left breast.    Objective: Vital signs are stable.  Patient is alert and oriented ×3.  Neck is soft without any adenopathy.  I do not palpate any axillary adenopathy.  Examination of both breasts reveals a normal exam.  The nipple areola on both sides are normal and the incision is healed in well.  Do not feel any abnormality at the prior surgery site.    Impression: Doing well status post excision of ducts from underneath left nipple because of bloody nipple discharge.  Benign papilloma was found on excision.  Plan: Token to the patient she did have a left breast mammogram last June however she's not had a mammogram of the right breast and over a year.  We will obtain a bilateral mammogram and I explained to her that stable then mammogram once a year afterwards.

## 2018-03-09 ENCOUNTER — OFFICE VISIT (OUTPATIENT)
Dept: INTERNAL MEDICINE | Facility: CLINIC | Age: 68
End: 2018-03-09
Payer: MEDICARE

## 2018-03-09 VITALS
HEART RATE: 76 BPM | DIASTOLIC BLOOD PRESSURE: 72 MMHG | TEMPERATURE: 98 F | SYSTOLIC BLOOD PRESSURE: 122 MMHG | HEIGHT: 62 IN | WEIGHT: 188.25 LBS | BODY MASS INDEX: 34.64 KG/M2

## 2018-03-09 DIAGNOSIS — I69.359 CVA, OLD, HEMIPARESIS: ICD-10-CM

## 2018-03-09 DIAGNOSIS — R63.5 WEIGHT GAIN: ICD-10-CM

## 2018-03-09 DIAGNOSIS — E78.5 HYPERLIPIDEMIA, UNSPECIFIED HYPERLIPIDEMIA TYPE: ICD-10-CM

## 2018-03-09 DIAGNOSIS — I69.30 HISTORY OF CVA WITH RESIDUAL DEFICIT: ICD-10-CM

## 2018-03-09 DIAGNOSIS — H00.014 HORDEOLUM EXTERNUM OF LEFT UPPER EYELID: Primary | ICD-10-CM

## 2018-03-09 DIAGNOSIS — I70.0 ATHEROSCLEROSIS OF AORTA: ICD-10-CM

## 2018-03-09 DIAGNOSIS — R73.09 ABNORMAL GLUCOSE: ICD-10-CM

## 2018-03-09 DIAGNOSIS — I10 ESSENTIAL HYPERTENSION: ICD-10-CM

## 2018-03-09 PROCEDURE — 99999 PR PBB SHADOW E&M-EST. PATIENT-LVL III: CPT | Mod: PBBFAC,,, | Performed by: FAMILY MEDICINE

## 2018-03-09 PROCEDURE — 3074F SYST BP LT 130 MM HG: CPT | Mod: S$GLB,,, | Performed by: FAMILY MEDICINE

## 2018-03-09 PROCEDURE — 99214 OFFICE O/P EST MOD 30 MIN: CPT | Mod: S$GLB,,, | Performed by: FAMILY MEDICINE

## 2018-03-09 PROCEDURE — 99499 UNLISTED E&M SERVICE: CPT | Mod: S$GLB,,, | Performed by: FAMILY MEDICINE

## 2018-03-09 PROCEDURE — 3078F DIAST BP <80 MM HG: CPT | Mod: S$GLB,,, | Performed by: FAMILY MEDICINE

## 2018-03-09 RX ORDER — ERYTHROMYCIN 5 MG/G
OINTMENT OPHTHALMIC NIGHTLY
Qty: 1 G | Refills: 0 | Status: SHIPPED | OUTPATIENT
Start: 2018-03-09 | End: 2018-05-17 | Stop reason: ALTCHOICE

## 2018-03-09 NOTE — PROGRESS NOTES
Patient, Tracy Najera (MRN #[id number]), presented with a recorded BMI of 35 kg/m^2 and a documented comorbidity(s):  - Hypertension  - Hyperlipidemia  to which the severe obesity is a contributing factor. This is consistent with the definition of severe obesity (BMI 35.0-35.9) with comorbidity (ICD-10 E66.01, Z68.35). The patient's severe obesity was monitored, evaluated, addressed and/or treated. This addendum to the medical record is made on 03/09/2018.

## 2018-03-09 NOTE — PROGRESS NOTES
"Subjective:      Patient ID: Tracy Najera is a 67 y.o. female.    Chief Complaint: Weight Gain    HPI  66 yo female here with c/o weight gain.  She eats 1-2 times a day.  Not really watching what she eats.   Tries to stay active.  No leg swelling.  meds are stable, taking those daily.  Has swollen/tender L upper eyelid.    Also reports a knot on back of R calf.  Been there for awhile, no pain/swelling with it.  Just noticed it.    Past Medical History:   Diagnosis Date    Anticoagulant long-term use     Plavix    Arthritis     CVA (cerebral vascular accident) 2008    R hemiparesis, R hand contracture    HTN (hypertension)     Hyperlipidemia      Family History   Problem Relation Age of Onset    Hypertension Mother     Hypertension Sister     Stroke Brother     Cancer Neg Hx      Past Surgical History:   Procedure Laterality Date    BREAST BIOPSY Left 2017    HYSTERECTOMY      partial     TONSILLECTOMY       Social History   Substance Use Topics    Smoking status: Never Smoker    Smokeless tobacco: Never Used    Alcohol use 0.0 oz/week      Comment: occ   No alcohol prior sx       /72   Pulse 76   Temp 98.1 °F (36.7 °C) (Tympanic)   Ht 5' 1.5" (1.562 m)   Wt 85.4 kg (188 lb 4.4 oz)   BMI 35.00 kg/m²     Review of Systems   Constitutional: Positive for unexpected weight change. Negative for activity change.   Respiratory: Negative for cough and shortness of breath.    Cardiovascular: Negative for chest pain, palpitations and leg swelling.   Gastrointestinal: Negative for abdominal distention, abdominal pain and constipation.   Genitourinary: Negative for difficulty urinating.   Skin: Negative for rash.       Objective:     Physical Exam   Constitutional: She is oriented to person, place, and time. She appears well-developed and well-nourished.   HENT:   Right Ear: External ear normal.   Left Ear: External ear normal.   Mouth/Throat: Oropharynx is clear and moist.   Eyes: Conjunctivae and " EOM are normal. Left eye exhibits hordeolum.       Cardiovascular: Normal rate, regular rhythm and normal heart sounds.    Pulmonary/Chest: Effort normal and breath sounds normal. No respiratory distress. She has no wheezes.   Abdominal: Soft. Bowel sounds are normal. She exhibits no distension. There is no tenderness.   Musculoskeletal: She exhibits no edema.   Back of R calf with palpable/firm nodule.  No pain/warmth/redness/swelling with it.   Neurological: She is alert and oriented to person, place, and time.   Skin: Skin is warm and dry.   Psychiatric: She has a normal mood and affect. Her behavior is normal. Judgment and thought content normal.   Nursing note and vitals reviewed.      Lab Results   Component Value Date    WBC 6.40 05/08/2017    HGB 12.0 05/08/2017    HCT 35.9 (L) 05/08/2017     05/08/2017    CHOL 202 (H) 10/06/2016    TRIG 72 10/06/2016    HDL 61 10/06/2016    ALT 11 05/24/2017    AST 16 05/24/2017     05/24/2017    K 3.7 05/24/2017     05/24/2017    CREATININE 1.2 05/24/2017    BUN 22 05/24/2017    CO2 26 05/24/2017    TSH 1.603 05/08/2017    INR 1.0 04/30/2012       Assessment:     1. Hordeolum externum of left upper eyelid    2. Essential hypertension    3. Hyperlipidemia, unspecified hyperlipidemia type    4. History of CVA with residual deficit    5. Atherosclerosis of aorta    6. CVA, old, hemiparesis    7. Abnormal glucose    8. Weight gain         Plan:     Hordeolum externum of left upper eyelid    Essential hypertension  -     CBC auto differential; Future; Expected date: 05/09/2018  -     Comprehensive metabolic panel; Future; Expected date: 05/09/2018    Hyperlipidemia, unspecified hyperlipidemia type  -     Lipid panel; Future; Expected date: 05/09/2018  -     TSH; Future; Expected date: 05/09/2018    History of CVA with residual deficit    Atherosclerosis of aorta    CVA, old, hemiparesis    Abnormal glucose  -     Hemoglobin A1c; Future; Expected date:  05/09/2018    Weight gain    Other orders  -     erythromycin (ROMYCIN) ophthalmic ointment; Place into the left eye every evening.  Dispense: 1 g; Refill: 0    BP stable, cont current meds  Start ointment/warm compresses for stye   Monitor nodule on R calf, no change/no pain  Discussed some dietary changes to focus on.  Cut out carbs, more protein/veggies.  Exercise/walk daily  F/u 2 mos with labs

## 2018-04-30 ENCOUNTER — PATIENT OUTREACH (OUTPATIENT)
Dept: ADMINISTRATIVE | Facility: HOSPITAL | Age: 68
End: 2018-04-30

## 2018-04-30 DIAGNOSIS — M89.9 BONE DISORDER: Primary | ICD-10-CM

## 2018-04-30 DIAGNOSIS — Z12.11 SCREENING FOR MALIGNANT NEOPLASM OF COLON: ICD-10-CM

## 2018-05-04 ENCOUNTER — DOCUMENTATION ONLY (OUTPATIENT)
Dept: ENDOSCOPY | Facility: HOSPITAL | Age: 68
End: 2018-05-04

## 2018-05-08 ENCOUNTER — LAB VISIT (OUTPATIENT)
Dept: LAB | Facility: HOSPITAL | Age: 68
End: 2018-05-08
Attending: FAMILY MEDICINE
Payer: MEDICARE

## 2018-05-08 DIAGNOSIS — R73.09 ABNORMAL GLUCOSE: ICD-10-CM

## 2018-05-08 DIAGNOSIS — E78.5 HYPERLIPIDEMIA, UNSPECIFIED HYPERLIPIDEMIA TYPE: ICD-10-CM

## 2018-05-08 DIAGNOSIS — I10 ESSENTIAL HYPERTENSION: ICD-10-CM

## 2018-05-08 LAB
ALBUMIN SERPL BCP-MCNC: 3.6 G/DL
ALP SERPL-CCNC: 80 U/L
ALT SERPL W/O P-5'-P-CCNC: 9 U/L
ANION GAP SERPL CALC-SCNC: 10 MMOL/L
AST SERPL-CCNC: 14 U/L
BASOPHILS # BLD AUTO: 0.04 K/UL
BASOPHILS NFR BLD: 0.5 %
BILIRUB SERPL-MCNC: 0.4 MG/DL
BUN SERPL-MCNC: 21 MG/DL
CALCIUM SERPL-MCNC: 9.6 MG/DL
CHLORIDE SERPL-SCNC: 108 MMOL/L
CHOLEST SERPL-MCNC: 188 MG/DL
CHOLEST/HDLC SERPL: 3.6 {RATIO}
CO2 SERPL-SCNC: 25 MMOL/L
CREAT SERPL-MCNC: 1.2 MG/DL
DIFFERENTIAL METHOD: ABNORMAL
EOSINOPHIL # BLD AUTO: 0 K/UL
EOSINOPHIL NFR BLD: 0.4 %
ERYTHROCYTE [DISTWIDTH] IN BLOOD BY AUTOMATED COUNT: 14 %
EST. GFR  (AFRICAN AMERICAN): 54 ML/MIN/1.73 M^2
EST. GFR  (NON AFRICAN AMERICAN): 46.9 ML/MIN/1.73 M^2
ESTIMATED AVG GLUCOSE: 108 MG/DL
GLUCOSE SERPL-MCNC: 88 MG/DL
HBA1C MFR BLD HPLC: 5.4 %
HCT VFR BLD AUTO: 37.5 %
HDLC SERPL-MCNC: 52 MG/DL
HDLC SERPL: 27.7 %
HGB BLD-MCNC: 11.1 G/DL
IMM GRANULOCYTES # BLD AUTO: 0.02 K/UL
IMM GRANULOCYTES NFR BLD AUTO: 0.3 %
LDLC SERPL CALC-MCNC: 119 MG/DL
LYMPHOCYTES # BLD AUTO: 1.8 K/UL
LYMPHOCYTES NFR BLD: 22.7 %
MCH RBC QN AUTO: 28.5 PG
MCHC RBC AUTO-ENTMCNC: 29.6 G/DL
MCV RBC AUTO: 96 FL
MONOCYTES # BLD AUTO: 0.6 K/UL
MONOCYTES NFR BLD: 7.5 %
NEUTROPHILS # BLD AUTO: 5.3 K/UL
NEUTROPHILS NFR BLD: 68.6 %
NONHDLC SERPL-MCNC: 136 MG/DL
NRBC BLD-RTO: 0 /100 WBC
PLATELET # BLD AUTO: 261 K/UL
PMV BLD AUTO: 11.5 FL
POTASSIUM SERPL-SCNC: 3.7 MMOL/L
PROT SERPL-MCNC: 7.3 G/DL
RBC # BLD AUTO: 3.9 M/UL
SODIUM SERPL-SCNC: 143 MMOL/L
TRIGL SERPL-MCNC: 85 MG/DL
TSH SERPL DL<=0.005 MIU/L-ACNC: 1.44 UIU/ML
WBC # BLD AUTO: 7.77 K/UL

## 2018-05-08 PROCEDURE — 83036 HEMOGLOBIN GLYCOSYLATED A1C: CPT

## 2018-05-08 PROCEDURE — 36415 COLL VENOUS BLD VENIPUNCTURE: CPT | Mod: PO

## 2018-05-08 PROCEDURE — 80053 COMPREHEN METABOLIC PANEL: CPT

## 2018-05-08 PROCEDURE — 84443 ASSAY THYROID STIM HORMONE: CPT

## 2018-05-08 PROCEDURE — 80061 LIPID PANEL: CPT

## 2018-05-08 PROCEDURE — 85025 COMPLETE CBC W/AUTO DIFF WBC: CPT

## 2018-05-17 ENCOUNTER — OFFICE VISIT (OUTPATIENT)
Dept: INTERNAL MEDICINE | Facility: CLINIC | Age: 68
End: 2018-05-17
Payer: MEDICARE

## 2018-05-17 VITALS
DIASTOLIC BLOOD PRESSURE: 74 MMHG | BODY MASS INDEX: 34.77 KG/M2 | TEMPERATURE: 98 F | HEART RATE: 76 BPM | HEIGHT: 62 IN | WEIGHT: 188.94 LBS | SYSTOLIC BLOOD PRESSURE: 134 MMHG

## 2018-05-17 DIAGNOSIS — E78.5 DYSLIPIDEMIA: ICD-10-CM

## 2018-05-17 DIAGNOSIS — N18.30 STAGE 3 CHRONIC KIDNEY DISEASE: ICD-10-CM

## 2018-05-17 DIAGNOSIS — Z00.00 ANNUAL PHYSICAL EXAM: Primary | ICD-10-CM

## 2018-05-17 DIAGNOSIS — R60.0 EDEMA EXTREMITIES: ICD-10-CM

## 2018-05-17 DIAGNOSIS — I69.30 HISTORY OF CVA WITH RESIDUAL DEFICIT: ICD-10-CM

## 2018-05-17 DIAGNOSIS — I10 ESSENTIAL HYPERTENSION: ICD-10-CM

## 2018-05-17 DIAGNOSIS — D64.9 ANEMIA, UNSPECIFIED TYPE: ICD-10-CM

## 2018-05-17 DIAGNOSIS — E66.9 CLASS 1 OBESITY WITH BODY MASS INDEX (BMI) OF 34.0 TO 34.9 IN ADULT, UNSPECIFIED OBESITY TYPE, UNSPECIFIED WHETHER SERIOUS COMORBIDITY PRESENT: ICD-10-CM

## 2018-05-17 PROCEDURE — 99499 UNLISTED E&M SERVICE: CPT | Mod: S$GLB,,, | Performed by: FAMILY MEDICINE

## 2018-05-17 PROCEDURE — 99397 PER PM REEVAL EST PAT 65+ YR: CPT | Mod: S$GLB,,, | Performed by: FAMILY MEDICINE

## 2018-05-17 PROCEDURE — 3075F SYST BP GE 130 - 139MM HG: CPT | Mod: CPTII,S$GLB,, | Performed by: FAMILY MEDICINE

## 2018-05-17 PROCEDURE — 99999 PR PBB SHADOW E&M-EST. PATIENT-LVL III: CPT | Mod: PBBFAC,,, | Performed by: FAMILY MEDICINE

## 2018-05-17 PROCEDURE — 3078F DIAST BP <80 MM HG: CPT | Mod: CPTII,S$GLB,, | Performed by: FAMILY MEDICINE

## 2018-05-17 RX ORDER — LOSARTAN POTASSIUM 100 MG/1
100 TABLET ORAL DAILY
Qty: 90 TABLET | Refills: 3 | Status: SHIPPED | OUTPATIENT
Start: 2018-05-17 | End: 2019-05-19 | Stop reason: SDUPTHER

## 2018-05-17 RX ORDER — ATORVASTATIN CALCIUM 40 MG/1
40 TABLET, FILM COATED ORAL DAILY
Qty: 90 TABLET | Refills: 3 | Status: SHIPPED | OUTPATIENT
Start: 2018-05-17 | End: 2019-05-07 | Stop reason: SDUPTHER

## 2018-05-17 RX ORDER — GABAPENTIN 100 MG/1
100 CAPSULE ORAL NIGHTLY
Qty: 30 CAPSULE | Refills: 3 | Status: SHIPPED | OUTPATIENT
Start: 2018-05-17 | End: 2018-06-09 | Stop reason: CLARIF

## 2018-05-17 RX ORDER — CLOPIDOGREL BISULFATE 75 MG/1
75 TABLET ORAL DAILY
Qty: 90 TABLET | Refills: 3 | Status: SHIPPED | OUTPATIENT
Start: 2018-05-17 | End: 2019-07-28 | Stop reason: SDUPTHER

## 2018-05-17 RX ORDER — HYDROCHLOROTHIAZIDE 12.5 MG/1
12.5 TABLET ORAL DAILY
Qty: 90 TABLET | Refills: 3 | Status: SHIPPED | OUTPATIENT
Start: 2018-05-17 | End: 2018-06-09 | Stop reason: CLARIF

## 2018-05-19 NOTE — PROGRESS NOTES
"Subjective:      Patient ID: Tracy Najera is a 67 y.o. female.    Chief Complaint: Annual Exam    HPI  68 yo female with hx of CVA that has left her with R hemiparesis here for annual.  Taking her blood thinner/BP meds and statin.  Reports chronic nerve pain in her R arm/leg present since her stroke.  Will take ASA occasionally or use topical cream analgesics.    Reports she has been doing ok overall.  No Cp/SOB.  Bowels are moving ok.  No blood in stool/urine or with brushing her teeth.    No falls  Mood is good    Past Medical History:   Diagnosis Date    Anticoagulant long-term use     Plavix    Arthritis     CVA (cerebral vascular accident) 2008    R hemiparesis, R hand contracture    HTN (hypertension)     Hyperlipidemia      Family History   Problem Relation Age of Onset    Hypertension Mother     Hypertension Sister     Stroke Brother     Cancer Neg Hx      Past Surgical History:   Procedure Laterality Date    BREAST BIOPSY Left 2017    HYSTERECTOMY      partial     TONSILLECTOMY       Social History   Substance Use Topics    Smoking status: Never Smoker    Smokeless tobacco: Never Used    Alcohol use 0.0 oz/week      Comment: occ   No alcohol prior sx       /74   Pulse 76   Temp 98.3 °F (36.8 °C) (Tympanic)   Ht 5' 2" (1.575 m)   Wt 85.7 kg (188 lb 15 oz)   BMI 34.56 kg/m²     Review of Systems   Constitutional: Negative for activity change, appetite change, chills, diaphoresis, fatigue, fever and unexpected weight change.   HENT: Negative for ear pain, hearing loss, postnasal drip, rhinorrhea and tinnitus.    Eyes: Negative for visual disturbance.   Respiratory: Negative for cough, shortness of breath and wheezing.    Cardiovascular: Negative for chest pain, palpitations and leg swelling.   Gastrointestinal: Negative for abdominal distention, abdominal pain, constipation and diarrhea.   Genitourinary: Negative for dysuria, frequency, hematuria and urgency.   Musculoskeletal: " Negative for back pain and joint swelling.   Neurological: Positive for numbness. Negative for weakness and headaches.   Hematological: Negative for adenopathy.   Psychiatric/Behavioral: Negative for confusion and decreased concentration.       Objective:     Physical Exam   Constitutional: She is oriented to person, place, and time. She appears well-developed and well-nourished. No distress.   HENT:   Right Ear: External ear normal.   Left Ear: External ear normal.   Nose: Nose normal.   Mouth/Throat: Oropharynx is clear and moist.   Eyes: Conjunctivae are normal. Pupils are equal, round, and reactive to light.   Neck: Normal range of motion. Neck supple. Carotid bruit is not present.   Cardiovascular: Normal rate, regular rhythm and normal heart sounds.    Pulmonary/Chest: Effort normal and breath sounds normal. No respiratory distress. She has no wheezes. She has no rales.   Abdominal: Soft. Bowel sounds are normal. She exhibits no distension. There is no tenderness. There is no guarding.   Musculoskeletal: Normal range of motion. She exhibits no edema.   Neurological: She is alert and oriented to person, place, and time. No cranial nerve deficit.   Skin: Skin is warm and dry. No rash noted.   Psychiatric: She has a normal mood and affect. Her behavior is normal. Judgment and thought content normal.   Nursing note and vitals reviewed.      Lab Results   Component Value Date    WBC 7.77 05/08/2018    HGB 11.1 (L) 05/08/2018    HCT 37.5 05/08/2018     05/08/2018    CHOL 188 05/08/2018    TRIG 85 05/08/2018    HDL 52 05/08/2018    ALT 9 (L) 05/08/2018    AST 14 05/08/2018     05/08/2018    K 3.7 05/08/2018     05/08/2018    CREATININE 1.2 05/08/2018    BUN 21 05/08/2018    CO2 25 05/08/2018    TSH 1.443 05/08/2018    INR 1.0 04/30/2012    HGBA1C 5.4 05/08/2018       Assessment:     1. Annual physical exam    2. Essential hypertension    3. Edema extremities    4. History of CVA with residual  deficit    5. Dyslipidemia    6. Anemia, unspecified type    7. Stage 3 chronic kidney disease    8. Class 1 obesity with body mass index (BMI) of 34.0 to 34.9 in adult, unspecified obesity type, unspecified whether serious comorbidity present         Plan:     Annual physical exam    Essential hypertension  -     hydroCHLOROthiazide (HYDRODIURIL) 12.5 MG Tab; Take 1 tablet (12.5 mg total) by mouth once daily.  Dispense: 90 tablet; Refill: 3    Edema extremities  -     hydroCHLOROthiazide (HYDRODIURIL) 12.5 MG Tab; Take 1 tablet (12.5 mg total) by mouth once daily.  Dispense: 90 tablet; Refill: 3    History of CVA with residual deficit  -     clopidogrel (PLAVIX) 75 mg tablet; Take 1 tablet (75 mg total) by mouth once daily.  Dispense: 90 tablet; Refill: 3    Dyslipidemia  -     Comprehensive metabolic panel; Future; Expected date: 11/17/2018  -     Lipid panel; Future; Expected date: 11/17/2018    Anemia, unspecified type  -     CBC auto differential; Future; Expected date: 11/17/2018    Stage 3 chronic kidney disease    Class 1 obesity with body mass index (BMI) of 34.0 to 34.9 in adult, unspecified obesity type, unspecified whether serious comorbidity present    Other orders  -     atorvastatin (LIPITOR) 40 MG tablet; Take 1 tablet (40 mg total) by mouth once daily.  Dispense: 90 tablet; Refill: 3  -     losartan (COZAAR) 100 MG tablet; Take 1 tablet (100 mg total) by mouth once daily.  Dispense: 90 tablet; Refill: 3  -     gabapentin (NEURONTIN) 100 MG capsule; Take 1 capsule (100 mg total) by mouth every evening.  Dispense: 30 capsule; Refill: 3    BP stable, cont current regimen  Lipids not at goal.  Increase lipitor to 40mg  Trial of neurontin 100mg nightly.  If helpful, cont if no improvement stop the med.  Let MD know  Cont with plavix  Work on better eating habits, exercise as tolerated and weight loss  LAbs and f/u 6 mos  Follow-up in about 6 months (around 11/17/2018).

## 2018-05-26 ENCOUNTER — HOSPITAL ENCOUNTER (EMERGENCY)
Facility: HOSPITAL | Age: 68
Discharge: HOME | End: 2018-05-26
Attending: EMERGENCY MEDICINE
Payer: MEDICARE

## 2018-05-26 VITALS
TEMPERATURE: 98 F | RESPIRATION RATE: 20 BRPM | HEART RATE: 85 BPM | WEIGHT: 194 LBS | BODY MASS INDEX: 35.48 KG/M2 | SYSTOLIC BLOOD PRESSURE: 178 MMHG | DIASTOLIC BLOOD PRESSURE: 94 MMHG | OXYGEN SATURATION: 100 %

## 2018-05-26 DIAGNOSIS — R07.9 CHEST PAIN: ICD-10-CM

## 2018-05-26 DIAGNOSIS — R60.9 SWELLING: ICD-10-CM

## 2018-05-26 DIAGNOSIS — R10.10 UPPER ABDOMINAL PAIN: ICD-10-CM

## 2018-05-26 DIAGNOSIS — R10.84 GENERALIZED ABDOMINAL PAIN: Primary | ICD-10-CM

## 2018-05-26 LAB
ALBUMIN SERPL BCP-MCNC: 4.1 G/DL
ALP SERPL-CCNC: 88 U/L
ALT SERPL W/O P-5'-P-CCNC: 17 U/L
ANION GAP SERPL CALC-SCNC: 11 MMOL/L
AST SERPL-CCNC: 18 U/L
BASOPHILS # BLD AUTO: 0.02 K/UL
BASOPHILS NFR BLD: 0.2 %
BILIRUB SERPL-MCNC: 0.5 MG/DL
BILIRUB UR QL STRIP: NEGATIVE
BUN SERPL-MCNC: 15 MG/DL
CALCIUM SERPL-MCNC: 10.2 MG/DL
CHLORIDE SERPL-SCNC: 105 MMOL/L
CLARITY UR: ABNORMAL
CO2 SERPL-SCNC: 23 MMOL/L
COLOR UR: YELLOW
CREAT SERPL-MCNC: 1.3 MG/DL
DIFFERENTIAL METHOD: ABNORMAL
EOSINOPHIL # BLD AUTO: 0.1 K/UL
EOSINOPHIL NFR BLD: 0.8 %
ERYTHROCYTE [DISTWIDTH] IN BLOOD BY AUTOMATED COUNT: 14.3 %
EST. GFR  (AFRICAN AMERICAN): 49 ML/MIN/1.73 M^2
EST. GFR  (NON AFRICAN AMERICAN): 43 ML/MIN/1.73 M^2
GLUCOSE SERPL-MCNC: 88 MG/DL
GLUCOSE UR QL STRIP: NEGATIVE
HCT VFR BLD AUTO: 36.9 %
HGB BLD-MCNC: 12.2 G/DL
HGB UR QL STRIP: NEGATIVE
KETONES UR QL STRIP: NEGATIVE
LEUKOCYTE ESTERASE UR QL STRIP: NEGATIVE
LIPASE SERPL-CCNC: 42 U/L
LYMPHOCYTES # BLD AUTO: 1.8 K/UL
LYMPHOCYTES NFR BLD: 20.1 %
MCH RBC QN AUTO: 29.1 PG
MCHC RBC AUTO-ENTMCNC: 33.1 G/DL
MCV RBC AUTO: 88 FL
MONOCYTES # BLD AUTO: 0.7 K/UL
MONOCYTES NFR BLD: 7.4 %
NEUTROPHILS # BLD AUTO: 6.6 K/UL
NEUTROPHILS NFR BLD: 71.5 %
NITRITE UR QL STRIP: NEGATIVE
PH UR STRIP: 7 [PH] (ref 5–8)
PLATELET # BLD AUTO: 275 K/UL
PMV BLD AUTO: 10.8 FL
POTASSIUM SERPL-SCNC: 4.1 MMOL/L
PROT SERPL-MCNC: 8.2 G/DL
PROT UR QL STRIP: NEGATIVE
RBC # BLD AUTO: 4.19 M/UL
SODIUM SERPL-SCNC: 139 MMOL/L
SP GR UR STRIP: 1.01 (ref 1–1.03)
TROPONIN I SERPL DL<=0.01 NG/ML-MCNC: <0.006 NG/ML
URN SPEC COLLECT METH UR: ABNORMAL
UROBILINOGEN UR STRIP-ACNC: NEGATIVE EU/DL
WBC # BLD AUTO: 9.16 K/UL

## 2018-05-26 PROCEDURE — 99285 EMERGENCY DEPT VISIT HI MDM: CPT | Mod: 25

## 2018-05-26 PROCEDURE — 25000003 PHARM REV CODE 250: Performed by: EMERGENCY MEDICINE

## 2018-05-26 PROCEDURE — 80053 COMPREHEN METABOLIC PANEL: CPT

## 2018-05-26 PROCEDURE — 85025 COMPLETE CBC W/AUTO DIFF WBC: CPT

## 2018-05-26 PROCEDURE — 25500020 PHARM REV CODE 255: Performed by: EMERGENCY MEDICINE

## 2018-05-26 PROCEDURE — 93010 ELECTROCARDIOGRAM REPORT: CPT | Mod: ,,, | Performed by: INTERNAL MEDICINE

## 2018-05-26 PROCEDURE — 96375 TX/PRO/DX INJ NEW DRUG ADDON: CPT

## 2018-05-26 PROCEDURE — 63600175 PHARM REV CODE 636 W HCPCS: Performed by: EMERGENCY MEDICINE

## 2018-05-26 PROCEDURE — 84484 ASSAY OF TROPONIN QUANT: CPT

## 2018-05-26 PROCEDURE — 96365 THER/PROPH/DIAG IV INF INIT: CPT

## 2018-05-26 PROCEDURE — 81003 URINALYSIS AUTO W/O SCOPE: CPT

## 2018-05-26 PROCEDURE — 83690 ASSAY OF LIPASE: CPT

## 2018-05-26 PROCEDURE — 93005 ELECTROCARDIOGRAM TRACING: CPT

## 2018-05-26 RX ORDER — ONDANSETRON 2 MG/ML
4 INJECTION INTRAMUSCULAR; INTRAVENOUS
Status: COMPLETED | OUTPATIENT
Start: 2018-05-26 | End: 2018-05-26

## 2018-05-26 RX ORDER — ONDANSETRON 4 MG/1
4 TABLET, FILM COATED ORAL EVERY 6 HOURS
Qty: 12 TABLET | Refills: 0 | Status: SHIPPED | OUTPATIENT
Start: 2018-05-26 | End: 2018-06-09 | Stop reason: CLARIF

## 2018-05-26 RX ORDER — DICYCLOMINE HYDROCHLORIDE 20 MG/1
20 TABLET ORAL 2 TIMES DAILY
Qty: 20 TABLET | Refills: 0 | Status: SHIPPED | OUTPATIENT
Start: 2018-05-26 | End: 2018-06-09 | Stop reason: CLARIF

## 2018-05-26 RX ORDER — MORPHINE SULFATE 10 MG/ML
4 INJECTION INTRAMUSCULAR; INTRAVENOUS; SUBCUTANEOUS
Status: COMPLETED | OUTPATIENT
Start: 2018-05-26 | End: 2018-05-26

## 2018-05-26 RX ADMIN — ONDANSETRON 4 MG: 2 INJECTION INTRAMUSCULAR; INTRAVENOUS at 11:05

## 2018-05-26 RX ADMIN — IOHEXOL 30 ML: 350 INJECTION, SOLUTION INTRAVENOUS at 01:05

## 2018-05-26 RX ADMIN — IOHEXOL 75 ML: 350 INJECTION, SOLUTION INTRAVENOUS at 01:05

## 2018-05-26 RX ADMIN — PROMETHAZINE HYDROCHLORIDE 12.5 MG: 25 INJECTION INTRAMUSCULAR; INTRAVENOUS at 11:05

## 2018-05-26 RX ADMIN — MORPHINE SULFATE 4 MG: 10 INJECTION INTRAVENOUS at 11:05

## 2018-05-26 NOTE — ED PROVIDER NOTES
SCRIBE #1 NOTE: I, Edwin Berumen, am scribing for, and in the presence of, Zachary Richards Do, MD. I have scribed the entire note.      History      Chief Complaint   Patient presents with    Abdominal Pain     abdominal pain radiating to back since last night       Review of patient's allergies indicates:  No Known Allergies     HPI   HPI    5/26/2018, 9:55 AM   History obtained from the patient      History of Present Illness: Tracy Najera is a 67 y.o. female patient who presents to the Emergency Department for abd pain which onset gradually this AM. Pt states sxs radiates to L side of abd and back. Symptoms are constant and moderate in severity. No mitigating or exacerbating factors reported. Associated sxs include R calf swelling. Patient denies any fever, chills, n/v/d, constipation, hematochezia, dysuria, hematuria, urinary frequency/urgency, CP, SOB   and all other sxs at this time. No prior Tx reported. No further complaints or concerns at this time.         Arrival mode: Personal vehicle    PCP: Ryan Hopper MD       Past Medical History:  Past Medical History:   Diagnosis Date    Anticoagulant long-term use     Plavix    Arthritis     CVA (cerebral vascular accident) 2008    R hemiparesis, R hand contracture    HTN (hypertension)     Hyperlipidemia        Past Surgical History:  Past Surgical History:   Procedure Laterality Date    BREAST BIOPSY Left 2017    HYSTERECTOMY      partial     TONSILLECTOMY           Family History:  Family History   Problem Relation Age of Onset    Hypertension Mother     Hypertension Sister     Stroke Brother     Cancer Neg Hx        Social History:  Social History     Social History Main Topics    Smoking status: Never Smoker    Smokeless tobacco: Never Used    Alcohol use 0.0 oz/week      Comment: occ   No alcohol prior sx    Drug use: No    Sexual activity: Yes     Partners: Male       ROS   Review of Systems   Constitutional: Negative for  chills and fever.   HENT: Negative for sore throat.    Respiratory: Negative for shortness of breath.    Cardiovascular: Negative for chest pain.   Gastrointestinal: Positive for abdominal pain. Negative for blood in stool, constipation, diarrhea, nausea and vomiting.   Genitourinary: Negative for dysuria, frequency, hematuria and urgency.   Musculoskeletal: Negative for back pain.        (+) R calf swelling   Skin: Negative for rash.   Neurological: Negative for weakness and numbness.   Hematological: Does not bruise/bleed easily.   All other systems reviewed and are negative.    Physical Exam      Initial Vitals [05/26/18 0928]   BP Pulse Resp Temp SpO2   (!) 191/99 98 18 98.3 °F (36.8 °C) 98 %      MAP       129.67          Physical Exam  Nursing Notes and Vital Signs Reviewed.  Constitutional: Patient is in no acute distress. Well-developed and well-nourished.  Head: Atraumatic. Normocephalic.  Eyes: PERRL. EOM intact. Conjunctivae are not pale. No scleral icterus.  ENT: Mucous membranes are moist. Oropharynx is clear and symmetric.    Neck: Supple. Full ROM. No lymphadenopathy.  Cardiovascular: Regular rate. Regular rhythm. No murmurs, rubs, or gallops. Distal pulses are 2+ and symmetric.  Pulmonary/Chest: No respiratory distress. Clear to auscultation bilaterally. No wheezing or rales.  Abdominal: Soft and non-distended.  There is no tenderness.  No rebound, guarding, or rigidity. Good bowel sounds.  Genitourinary: No CVA tenderness  Musculoskeletal: Moves all extremities. No obvious deformities. No edema. R calf tenderness noted no cellulitis, signs of infection, and BLE warm to touch. Cap refill distally is <2 seconds. DP and PT pulses are equal and 2+ bilaterally.  Skin: Warm and dry.  Neurological:  Alert, awake, and appropriate.  Normal speech.  No acute focal neurological deficits are appreciated.  Psychiatric: Normal affect. Good eye contact. Appropriate in content.    ED Course    Procedures  ED Vital  Signs:  Vitals:    05/26/18 0928 05/26/18 1045 05/26/18 1152 05/26/18 1159   BP: (!) 191/99  (!) 183/95    Pulse: 98 88  82   Resp: 18      Temp: 98.3 °F (36.8 °C)      TempSrc: Oral      SpO2: 98%   100%   Weight: 88 kg (194 lb 0.1 oz)       05/26/18 1200 05/26/18 1352 05/26/18 1355   BP:   (!) 178/94   Pulse: 84 85 85   Resp:   20   Temp:   98.2 °F (36.8 °C)   TempSrc:   Oral   SpO2: 98% 100% 100%   Weight:          Abnormal Lab Results:  Labs Reviewed   CBC W/ AUTO DIFFERENTIAL - Abnormal; Notable for the following:        Result Value    Hematocrit 36.9 (*)     All other components within normal limits   COMPREHENSIVE METABOLIC PANEL - Abnormal; Notable for the following:     eGFR if  49 (*)     eGFR if non  43 (*)     All other components within normal limits   URINALYSIS - Abnormal; Notable for the following:     Appearance, UA Hazy (*)     All other components within normal limits   LIPASE   TROPONIN I        All Lab Results:  Results for orders placed or performed during the hospital encounter of 05/26/18   CBC W/ AUTO DIFFERENTIAL   Result Value Ref Range    WBC 9.16 3.90 - 12.70 K/uL    RBC 4.19 4.00 - 5.40 M/uL    Hemoglobin 12.2 12.0 - 16.0 g/dL    Hematocrit 36.9 (L) 37.0 - 48.5 %    MCV 88 82 - 98 fL    MCH 29.1 27.0 - 31.0 pg    MCHC 33.1 32.0 - 36.0 g/dL    RDW 14.3 11.5 - 14.5 %    Platelets 275 150 - 350 K/uL    MPV 10.8 9.2 - 12.9 fL    Gran # (ANC) 6.6 1.8 - 7.7 K/uL    Lymph # 1.8 1.0 - 4.8 K/uL    Mono # 0.7 0.3 - 1.0 K/uL    Eos # 0.1 0.0 - 0.5 K/uL    Baso # 0.02 0.00 - 0.20 K/uL    Gran% 71.5 38.0 - 73.0 %    Lymph% 20.1 18.0 - 48.0 %    Mono% 7.4 4.0 - 15.0 %    Eosinophil% 0.8 0.0 - 8.0 %    Basophil% 0.2 0.0 - 1.9 %    Differential Method Automated    Comp. Metabolic Panel   Result Value Ref Range    Sodium 139 136 - 145 mmol/L    Potassium 4.1 3.5 - 5.1 mmol/L    Chloride 105 95 - 110 mmol/L    CO2 23 23 - 29 mmol/L    Glucose 88 70 - 110 mg/dL    BUN, Bld  15 8 - 23 mg/dL    Creatinine 1.3 0.5 - 1.4 mg/dL    Calcium 10.2 8.7 - 10.5 mg/dL    Total Protein 8.2 6.0 - 8.4 g/dL    Albumin 4.1 3.5 - 5.2 g/dL    Total Bilirubin 0.5 0.1 - 1.0 mg/dL    Alkaline Phosphatase 88 55 - 135 U/L    AST 18 10 - 40 U/L    ALT 17 10 - 44 U/L    Anion Gap 11 8 - 16 mmol/L    eGFR if African American 49 (A) >60 mL/min/1.73 m^2    eGFR if non African American 43 (A) >60 mL/min/1.73 m^2   Lipase   Result Value Ref Range    Lipase 42 4 - 60 U/L   Urinalysis - Clean Catch   Result Value Ref Range    Specimen UA Urine, Clean Catch     Color, UA Yellow Yellow, Straw, Vale    Appearance, UA Hazy (A) Clear    pH, UA 7.0 5.0 - 8.0    Specific Gravity, UA 1.015 1.005 - 1.030    Protein, UA Negative Negative    Glucose, UA Negative Negative    Ketones, UA Negative Negative    Bilirubin (UA) Negative Negative    Occult Blood UA Negative Negative    Nitrite, UA Negative Negative    Urobilinogen, UA Negative <2.0 EU/dL    Leukocytes, UA Negative Negative   Troponin I   Result Value Ref Range    Troponin I <0.006 0.000 - 0.026 ng/mL         Imaging Results:  Imaging Results          CT Abdomen Pelvis With Contrast (Final result)  Result time 05/26/18 13:57:06    Final result by James Roman III, MD (05/26/18 13:57:06)                 Impression:      1. No acute abnormality identified in the abdomen or pelvis.  Small benign-appearing bilateral ovarian cysts.  Mild constipation.      Electronically signed by: James Roman MD  Date:    05/26/2018  Time:    13:57             Narrative:    EXAMINATION:  CT ABDOMEN PELVIS WITH CONTRAST    CLINICAL HISTORY:  Abdominal pain; gastroenteritis or colitis suspected;    TECHNIQUE:  Routine abdominal and pelvic CT performed before and after the IV administration of 75 mL Omnipaque 350. Coronal and sagittal images obtained. All CT scans at this facility use dose modulation, iterative reconstruction, and/or weight based dosing when appropriate to reduce  radiation dose to as low as reasonably achievable.    COMPARISON:  Prior abdominal CT 04/30/2012    FINDINGS:  No acute disease is seen in the lung bases.  Stable chronic rounded atelectasis in the right lung base.  No acute osseous abnormality or suspicious bone marrow lesion identified. Multilevel thoracic and lumbar degenerative change skin noted most advanced at L5-S1 with grade 1 degenerative anterolisthesis, chronic central canal and foraminal stenosis.    There is a moderate amount of retained stool in the right colon suggesting constipation.  There is no evidence of bowel obstruction, acute inflammatory process or other acute bowel pathology. The stomach is within normal limits. No evidence of appendicitis. No free intraperitoneal fluid, free air or abscess identified.    There is a subcentimeter benign-appearing hypodense cystic appearing lesion in the lateral attic segment which appears similar to prior exam.  The liver is otherwise unremarkable.  The gallbladder and bile ducts are unremarkable. The pancreas, spleen and adrenals are unremarkable. No aortic aneurysm is identified. The kidneys and ureters are unremarkable other than subcentimeter simple cysts.  The bladder is within normal limits. The uterus is surgically absent.  There are small benign-appearing bilateral ovarian cysts measuring up to 1.3 cm on the right.  No pathologically enlarged lymph nodes are identified.                               US Lower Extremity Veins Right (Final result)  Result time 05/26/18 10:53:47    Final result by James Roman III, MD (05/26/18 10:53:47)                 Impression:      No evidence of DVT in the right lower extremity.      Electronically signed by: James Roman MD  Date:    05/26/2018  Time:    10:53             Narrative:    EXAMINATION:  US LOWER EXTREMITY VEINS RIGHT    CLINICAL HISTORY:  Edema, unspecified.  Right leg swelling.    TECHNIQUE:  Grayscale sonographic imaging with color and spectral  Doppler evaluation of the deep veins of the right lower extremities was performed.    FINDINGS:  The right common femoral, deep femoral, superficial femoral, greater saphenous, popliteal and visualized calf veins are patent and compressible without evidence of DVT.  Color and spectral Doppler evaluation demonstrates normal phasic flow and satisfactory response to augmentation.                               X-Ray Chest PA And Lateral (Final result)  Result time 05/26/18 10:52:57    Final result by James Roman III, MD (05/26/18 10:52:57)                 Impression:      No acute disease identified in the chest.      Electronically signed by: James Roman MD  Date:    05/26/2018  Time:    10:52             Narrative:    EXAMINATION:  XR CHEST PA AND LATERAL    CLINICAL HISTORY:  Chest pain, unspecified    COMPARISON:  03/29/2015    FINDINGS:  The cardiomediastinal silhouette is within normal limits.  The lungs appear clear of active disease.  No acute osseous abnormality identified.                               The EKG was ordered, reviewed, and independently interpreted by the ED provider.  Interpretation time: 1053  Rate: 88 BPM  Rhythm: normal sinus rhythm  Interpretation: Normal EKG. No STEMI.           The Emergency Provider reviewed the vital signs and test results, which are outlined above.    ED Discussion     2:22 PM: Reassessed pt at this time. Despite abd CT findings pt stated she has been having nl Bowel movements. Pt states her condition has improved at this time. Discussed with pt all pertinent ED information and results. Discussed pt dx and plan of tx. Gave pt all f/u and return to the ED instructions. All questions and concerns were addressed at this time. Pt expresses understanding of information and instructions, and is comfortable with plan to discharge. Pt is stable for discharge.    I discussed with patient and/or family/caretaker that evaluation in the ED does not suggest any emergent or  life threatening medical conditions requiring immediate intervention beyond what was provided in the ED, and I believe patient is safe for discharge.  Regardless, an unremarkable evaluation in the ED does not preclude the development or presence of a serious of life threatening condition. As such, patient was instructed to return immediately for any worsening or change in current symptoms.        ED Medication(s):  Medications   morphine injection 4 mg (4 mg Intravenous Given 5/26/18 1113)   ondansetron injection 4 mg (4 mg Intravenous Given 5/26/18 1113)   promethazine (PHENERGAN) 12.5 mg in dextrose 5 % 50 mL IVPB (0 mg Intravenous Stopped 5/26/18 1209)   omnipaque 350 iohexol 75 mL (75 mLs Intravenous Given 5/26/18 1337)   omnipaque 350 iohexol 30 mL (30 mLs Oral Given 5/26/18 1334)       Discharge Medication List as of 5/26/2018  2:24 PM      START taking these medications    Details   dicyclomine (BENTYL) 20 mg tablet Take 1 tablet (20 mg total) by mouth 2 (two) times daily., Starting Sat 5/26/2018, Until Mon 6/25/2018, Normal      ondansetron (ZOFRAN) 4 MG tablet Take 1 tablet (4 mg total) by mouth every 6 (six) hours., Starting Sat 5/26/2018, Normal             Follow-up Information     Ryan Hopper MD In 2 days.    Specialty:  Family Medicine  Contact information:  [id number] [facility] [id number] [phone]                     Medical Decision Making    Medical Decision Making:   Clinical Tests:   Lab Tests: Ordered and Reviewed  Radiological Study: Ordered and Reviewed  Medical Tests: Ordered and Reviewed           Scribe Attestation:   Scribe #1: I performed the above scribed service and the documentation accurately describes the services I performed. I attest to the accuracy of the note.    Attending:   Physician Attestation Statement for Scribe #1: I, Zachary Richards Do, MD, personally performed the services described in this documentation, as scribed by Edwin Berumen, in my presence,  and it is both accurate and complete.          Clinical Impression       ICD-10-CM ICD-9-CM   1. Generalized abdominal pain R10.84 789.07   2. Swelling R60.9 782.3   3. Upper abdominal pain R10.10 789.09   4. Chest pain R07.9 786.50       Disposition:   Disposition: Discharged  Condition: Stable         Zachary Richards Do, MD  05/26/18 3326

## 2018-06-09 ENCOUNTER — HOSPITAL ENCOUNTER (EMERGENCY)
Facility: HOSPITAL | Age: 68
Discharge: HOME | End: 2018-06-09
Payer: MEDICARE

## 2018-06-09 VITALS
OXYGEN SATURATION: 98 % | SYSTOLIC BLOOD PRESSURE: 184 MMHG | WEIGHT: 191.5 LBS | RESPIRATION RATE: 20 BRPM | BODY MASS INDEX: 32.69 KG/M2 | TEMPERATURE: 98 F | HEART RATE: 88 BPM | HEIGHT: 64 IN | DIASTOLIC BLOOD PRESSURE: 92 MMHG

## 2018-06-09 DIAGNOSIS — R09.82 POST-NASAL DRIP: ICD-10-CM

## 2018-06-09 DIAGNOSIS — R05.9 COUGH: ICD-10-CM

## 2018-06-09 DIAGNOSIS — J40 BRONCHITIS: Primary | ICD-10-CM

## 2018-06-09 PROCEDURE — 99283 EMERGENCY DEPT VISIT LOW MDM: CPT

## 2018-06-09 RX ORDER — PROMETHAZINE HYDROCHLORIDE AND DEXTROMETHORPHAN HYDROBROMIDE 6.25; 15 MG/5ML; MG/5ML
5 SYRUP ORAL NIGHTLY PRN
Qty: 120 ML | Refills: 0 | Status: SHIPPED | OUTPATIENT
Start: 2018-06-09 | End: 2018-11-13

## 2018-06-09 RX ORDER — FLUTICASONE PROPIONATE 50 MCG
2 SPRAY, SUSPENSION (ML) NASAL DAILY
Qty: 1 BOTTLE | Refills: 0 | Status: SHIPPED | OUTPATIENT
Start: 2018-06-09 | End: 2018-11-13 | Stop reason: SDUPTHER

## 2018-06-09 NOTE — ED PROVIDER NOTES
"SCRIBE #1 NOTE: I, Orlando Boyd, am scribing for, and in the presence of, MALACHI Patel I have scribed the entire note.      History      Chief Complaint   Patient presents with    URI     productive cough with green/yellow mucous x3-4 days    2    Review of patient's allergies indicates:  No Known Allergies     HPI   HPI    6/9/2018, 4:47 PM   History obtained from the patient      History of Present Illness: Tracy Najera is a 67 y.o. female patient who presents to the Emergency Department for productive cough w/ "green" sputum which onset gradually 3-4 days ago. Symptoms are intermittent and moderate in severity. Sxs are worsened at night. No mitigating factors reported. No other associated sxs reported. Patient denies any fever, chills, chest pain, SOB, nasal congestion, rhinorrhea, sore throat, n/v/d, headache, dizziness and all other sxs at this time. No further complaints or concerns at this time.       Arrival mode: Personal vehicle    PCP: Ryan Hopper MD       Past Medical History:  Past Medical History:   Diagnosis Date    Anticoagulant long-term use     Plavix    Arthritis     CVA (cerebral vascular accident) 2008    R hemiparesis, R hand contracture    HTN (hypertension)     Hyperlipidemia        Past Surgical History:  Past Surgical History:   Procedure Laterality Date    BREAST BIOPSY Left 2017    HYSTERECTOMY      partial     TONSILLECTOMY           Family History:  Family History   Problem Relation Age of Onset    Hypertension Mother     Hypertension Sister     Stroke Brother     Cancer Neg Hx        Social History:  Social History     Social History Main Topics    Smoking status: Never Smoker    Smokeless tobacco: Never Used    Alcohol use 0.0 oz/week      Comment: occ    Drug use: No    Sexual activity: Yes     Partners: Male       ROS   Review of Systems   Constitutional: Negative for chills and fever.   HENT: Negative for congestion, ear pain, rhinorrhea, sinus " "pain, sinus pressure and sore throat.    Respiratory: Positive for cough. Negative for shortness of breath.    Cardiovascular: Negative for chest pain.   Gastrointestinal: Negative for abdominal pain, diarrhea, nausea and vomiting.   Genitourinary: Negative for difficulty urinating and urgency.   Neurological: Negative for dizziness, weakness, light-headedness, numbness and headaches.   All other systems reviewed and are negative.      Physical Exam      Initial Vitals [06/09/18 1619]   BP Pulse Resp Temp SpO2   (!) 192/104 86 18 98.2 °F (36.8 °C) 98 %      MAP       133.33          Physical Exam  Nursing Notes and Vital Signs Reviewed.  Constitutional: Patient is in no apparent distress. Well-developed and well-nourished.  Head: Atraumatic. Normocephalic.  Eyes: PERRL. EOM intact. Conjunctivae are not pale. No scleral icterus.  ENT: Mucous membranes are moist. Oropharynx is clear and symmetric. Post nasal drip noted.   Neck: Supple. Full ROM. No lymphadenopathy.  Cardiovascular: Regular rate. Regular rhythm. No murmurs, rubs, or gallops. Distal pulses are 2+ and symmetric.  Pulmonary/Chest: No respiratory distress. Clear to auscultation bilaterally. No wheezing or rales. Cough noted.   Abdominal: Soft and non-distended.  There is no tenderness.  No rebound, guarding, or rigidity. Good bowel sounds.  Musculoskeletal: Moves all extremities. No obvious deformities. No edema. No calf tenderness.  Skin: Warm and dry.  Neurological:  Alert, awake, and appropriate.  Normal speech.  No acute focal neurological deficits are appreciated.  Psychiatric: Normal affect. Good eye contact. Appropriate in content.    ED Course    Procedures  ED Vital Signs:  Vitals:    06/09/18 1619 06/09/18 1825   BP: (!) 192/104 (!) 184/92   Pulse: 86 88   Resp: 18 20   Temp: 98.2 °F (36.8 °C)    TempSrc: Oral    SpO2: 98% 98%   Weight: 86.8 kg (191 lb 7.5 oz)    Height: 5' 4" (1.626 m)          Imaging Results:  Imaging Results          X-Ray " Chest PA And Lateral (Final result)  Result time 06/09/18 18:42:40    Final result by Chris Minaya MD (06/09/18 18:42:40)                 Impression:      1.  Negative for acute process involving the chest.    2.  Stable findings as noted above.      Electronically signed by: Chris Minaya MD  Date:    06/09/2018  Time:    18:42             Narrative:    EXAMINATION:  XR CHEST PA AND LATERAL    CLINICAL HISTORY:  Cough    COMPARISON:  Comparisons are made to studies dating back to March 29, 2015.    FINDINGS:  The study is rotated to the left.  The lungs are clear. The cardiac silhouette size is normal. The trachea is midline and the mediastinal width is normal. Negative for focal infiltrate, effusion or pneumothorax. Pulmonary vasculature is normal. Negative for osseous abnormalities. Tortuous aorta.  Marginal spondylosis.  Calcifications of the aortic knob.  Mild degenerative changes of the shoulder girdles.  Eventration of the hemidiaphragms.                                        The Emergency Provider reviewed the vital signs and test results, which are outlined above.    ED Discussion     6:48 PM: Reassessed pt at this time. Discussed with pt all pertinent ED information and results. Discussed pt dx and plan of tx. Gave pt all f/u and return to the ED instructions. All questions and concerns were addressed at this time. Pt expresses understanding of information and instructions, and is comfortable with plan to discharge. Pt is stable for discharge.    I discussed with patient and/or family/caretaker that evaluation in the ED does not suggest any emergent or life threatening medical conditions requiring immediate intervention beyond what was provided in the ED, and I believe patient is safe for discharge.  Regardless, an unremarkable evaluation in the ED does not preclude the development or presence of a serious of life threatening condition. As such, patient was instructed to return immediately for any  worsening or change in current symptoms.      ED Medication(s):  Medications - No data to display    Discharge Medication List as of 6/9/2018  6:54 PM      START taking these medications    Details   fluticasone (FLONASE) 50 mcg/actuation nasal spray 2 sprays (100 mcg total) by Each Nare route once daily., Starting Sat 6/9/2018, Print      promethazine-dextromethorphan (PROMETHAZINE-DM) 6.25-15 mg/5 mL Syrp Take 5 mLs by mouth nightly as needed., Starting Sat 6/9/2018, Print             Follow-up Information     Ryan Hopper MD In 3 days.    Specialty:  Family Medicine  Contact information:  [id number] [place] [address] [phone]                     Medical Decision Making    Medical Decision Making:   Clinical Tests:   Radiological Study: Ordered and Reviewed     Additional MDM:   Hypertension: The patient has hypertension (no treatment required at this time). The patient's condition was felt to be stable.        Scribe Attestation:   Scribe #1: I performed the above scribed service and the documentation accurately describes the services I performed. I attest to the accuracy of the note.    Attending:   Physician Attestation Statement for Scribe #1: I, MALACHI Patel, personally performed the services described in this documentation, as scribed by Orlando Boyd, in my presence, and it is both accurate and complete.          Clinical Impression       ICD-10-CM ICD-9-CM   1. Bronchitis J40 490   2. Cough R05 786.2   3. Post-nasal drip R09.82 784.91       Disposition:   Disposition: Discharged  Condition: Stable         Sara Ley PA-C  06/09/18 2200

## 2018-10-12 ENCOUNTER — PES CALL (OUTPATIENT)
Dept: ADMINISTRATIVE | Facility: CLINIC | Age: 68
End: 2018-10-12

## 2018-11-13 ENCOUNTER — LAB VISIT (OUTPATIENT)
Dept: LAB | Facility: HOSPITAL | Age: 68
End: 2018-11-13
Attending: FAMILY MEDICINE
Payer: MEDICARE

## 2018-11-13 ENCOUNTER — OFFICE VISIT (OUTPATIENT)
Dept: INTERNAL MEDICINE | Facility: CLINIC | Age: 68
End: 2018-11-13
Payer: MEDICARE

## 2018-11-13 VITALS
DIASTOLIC BLOOD PRESSURE: 86 MMHG | BODY MASS INDEX: 32.33 KG/M2 | HEIGHT: 64 IN | HEART RATE: 78 BPM | TEMPERATURE: 97 F | WEIGHT: 189.38 LBS | SYSTOLIC BLOOD PRESSURE: 132 MMHG

## 2018-11-13 DIAGNOSIS — I10 ESSENTIAL HYPERTENSION: Primary | ICD-10-CM

## 2018-11-13 DIAGNOSIS — Z12.11 COLON CANCER SCREENING: ICD-10-CM

## 2018-11-13 DIAGNOSIS — E78.5 HYPERLIPIDEMIA, UNSPECIFIED HYPERLIPIDEMIA TYPE: ICD-10-CM

## 2018-11-13 DIAGNOSIS — N18.30 STAGE 3 CHRONIC KIDNEY DISEASE: ICD-10-CM

## 2018-11-13 DIAGNOSIS — E78.5 DYSLIPIDEMIA: ICD-10-CM

## 2018-11-13 DIAGNOSIS — Z13.820 OSTEOPOROSIS SCREENING: ICD-10-CM

## 2018-11-13 DIAGNOSIS — I69.30 HISTORY OF CVA WITH RESIDUAL DEFICIT: ICD-10-CM

## 2018-11-13 LAB
ALBUMIN SERPL BCP-MCNC: 3.8 G/DL
ALP SERPL-CCNC: 78 U/L
ALT SERPL W/O P-5'-P-CCNC: 14 U/L
ANION GAP SERPL CALC-SCNC: 6 MMOL/L
AST SERPL-CCNC: 15 U/L
BILIRUB SERPL-MCNC: 0.5 MG/DL
BUN SERPL-MCNC: 10 MG/DL
CALCIUM SERPL-MCNC: 9.6 MG/DL
CHLORIDE SERPL-SCNC: 107 MMOL/L
CHOLEST SERPL-MCNC: 181 MG/DL
CHOLEST/HDLC SERPL: 3.2 {RATIO}
CO2 SERPL-SCNC: 27 MMOL/L
CREAT SERPL-MCNC: 1 MG/DL
EST. GFR  (AFRICAN AMERICAN): >60 ML/MIN/1.73 M^2
EST. GFR  (NON AFRICAN AMERICAN): 58 ML/MIN/1.73 M^2
GLUCOSE SERPL-MCNC: 94 MG/DL
HDLC SERPL-MCNC: 56 MG/DL
HDLC SERPL: 30.9 %
LDLC SERPL CALC-MCNC: 104.4 MG/DL
NONHDLC SERPL-MCNC: 125 MG/DL
POTASSIUM SERPL-SCNC: 3.9 MMOL/L
PROT SERPL-MCNC: 7.7 G/DL
SODIUM SERPL-SCNC: 140 MMOL/L
TRIGL SERPL-MCNC: 103 MG/DL

## 2018-11-13 PROCEDURE — 1101F PT FALLS ASSESS-DOCD LE1/YR: CPT | Mod: CPTII,HCNC,S$GLB, | Performed by: FAMILY MEDICINE

## 2018-11-13 PROCEDURE — 3075F SYST BP GE 130 - 139MM HG: CPT | Mod: CPTII,HCNC,S$GLB, | Performed by: FAMILY MEDICINE

## 2018-11-13 PROCEDURE — 99214 OFFICE O/P EST MOD 30 MIN: CPT | Mod: HCNC,S$GLB,, | Performed by: FAMILY MEDICINE

## 2018-11-13 PROCEDURE — 36415 COLL VENOUS BLD VENIPUNCTURE: CPT | Mod: HCNC,PO

## 2018-11-13 PROCEDURE — 80061 LIPID PANEL: CPT | Mod: HCNC

## 2018-11-13 PROCEDURE — 3079F DIAST BP 80-89 MM HG: CPT | Mod: CPTII,HCNC,S$GLB, | Performed by: FAMILY MEDICINE

## 2018-11-13 PROCEDURE — 99999 PR PBB SHADOW E&M-EST. PATIENT-LVL III: CPT | Mod: PBBFAC,HCNC,, | Performed by: FAMILY MEDICINE

## 2018-11-13 PROCEDURE — 80053 COMPREHEN METABOLIC PANEL: CPT | Mod: HCNC

## 2018-11-13 RX ORDER — FLUTICASONE PROPIONATE 50 MCG
2 SPRAY, SUSPENSION (ML) NASAL DAILY
Qty: 1 BOTTLE | Refills: 3 | Status: SHIPPED | OUTPATIENT
Start: 2018-11-13 | End: 2019-04-25 | Stop reason: SDUPTHER

## 2018-11-14 ENCOUNTER — APPOINTMENT (OUTPATIENT)
Dept: RADIOLOGY | Facility: HOSPITAL | Age: 68
End: 2018-11-14
Attending: FAMILY MEDICINE
Payer: MEDICARE

## 2018-11-14 DIAGNOSIS — Z13.820 OSTEOPOROSIS SCREENING: ICD-10-CM

## 2018-11-14 PROCEDURE — 77080 DXA BONE DENSITY AXIAL: CPT | Mod: GA,TC,HCNC

## 2018-11-14 PROCEDURE — 77080 DXA BONE DENSITY AXIAL: CPT | Mod: 26,GA,HCNC, | Performed by: RADIOLOGY

## 2018-11-14 NOTE — PROGRESS NOTES
"Subjective:      Patient ID: Tracy Najera is a 68 y.o. female.    Chief Complaint: Follow-up (6 month )    HPI  67 yo female with HTN/hyperlipidemia/CVA here for f/u.  She is taking meds daily, did forget one day yesterday.    She is feeling well, no complaints.  No falls.  Mood is stable  Trying to eat well but needs to lose weight.    Past Medical History:   Diagnosis Date    Anticoagulant long-term use     Plavix    Arthritis     CVA (cerebral vascular accident) 2008    R hemiparesis, R hand contracture    HTN (hypertension)     Hyperlipidemia      Family History   Problem Relation Age of Onset    Hypertension Mother     Hypertension Sister     Stroke Brother     Cancer Neg Hx      Past Surgical History:   Procedure Laterality Date    BREAST BIOPSY Left 2017    COLONOSCOPY N/A 9/30/2013    [facility]    EXCISION-DUCT Left 6/1/2017    Performed by Bernard [facility] at [facility] OR    HYSTERECTOMY      partial     TONSILLECTOMY       Social History     Tobacco Use    Smoking status: Never Smoker    Smokeless tobacco: Never Used   Substance Use Topics    Alcohol use: Yes     Alcohol/week: 0.0 oz     Comment: occ    Drug use: No       /86 (BP Location: Left arm, Patient Position: Sitting, BP Method: Large (Manual))   Pulse 78   Temp 97.2 °F (36.2 °C) (Tympanic)   Ht 5' 4" (1.626 m)   Wt 85.9 kg (189 lb 6 oz)   BMI 32.51 kg/m²     Review of Systems   Constitutional: Negative for activity change, appetite change, chills, diaphoresis, fatigue, fever and unexpected weight change.   HENT: Negative for ear pain, hearing loss, postnasal drip, rhinorrhea and tinnitus.    Eyes: Negative for visual disturbance.   Respiratory: Negative for cough, shortness of breath and wheezing.    Cardiovascular: Negative for chest pain, palpitations and leg swelling.   Gastrointestinal: Negative for abdominal distention.   Genitourinary: Negative for dysuria, frequency, hematuria " and urgency.   Musculoskeletal: Negative for back pain and joint swelling.   Neurological: Negative for weakness and headaches.   Hematological: Negative for adenopathy.   Psychiatric/Behavioral: Negative for confusion and decreased concentration.       Objective:     Physical Exam   Constitutional: She is oriented to person, place, and time. She appears well-developed and well-nourished. No distress.   HENT:   Right Ear: External ear normal.   Left Ear: External ear normal.   Nose: Nose normal.   Mouth/Throat: Oropharynx is clear and moist.   Eyes: Conjunctivae are normal. Pupils are equal, round, and reactive to light.   Neck: Normal range of motion. Neck supple. Carotid bruit is not present.   Cardiovascular: Normal rate, regular rhythm and normal heart sounds.   Pulmonary/Chest: Effort normal and breath sounds normal. No respiratory distress. She has no wheezes. She has no rales.   Abdominal: Soft. Bowel sounds are normal. She exhibits no distension. There is no tenderness. There is no guarding.   Musculoskeletal: She exhibits no edema.   Neurological: She is alert and oriented to person, place, and time. No cranial nerve deficit.   Skin: Skin is warm and dry. No rash noted.   Psychiatric: She has a normal mood and affect. Her behavior is normal. Judgment and thought content normal.   Nursing note and vitals reviewed.      Lab Results   Component Value Date    WBC 9.16 05/26/2018    HGB 12.2 05/26/2018    HCT 36.9 (L) 05/26/2018     05/26/2018    CHOL 181 11/13/2018    TRIG 103 11/13/2018    HDL 56 11/13/2018    ALT 14 11/13/2018    AST 15 11/13/2018     11/13/2018    K 3.9 11/13/2018     11/13/2018    CREATININE 1.0 11/13/2018    BUN 10 11/13/2018    CO2 27 11/13/2018    TSH 1.443 05/08/2018    INR 1.0 04/30/2012    HGBA1C 5.4 05/08/2018       Assessment:     1. Essential hypertension    2. History of CVA with residual deficit    3. Stage 3 chronic kidney disease    4. Hyperlipidemia,  unspecified hyperlipidemia type    5. Colon cancer screening    6. Osteoporosis screening         Plan:     Essential hypertension    History of CVA with residual deficit    Stage 3 chronic kidney disease    Hyperlipidemia, unspecified hyperlipidemia type    Colon cancer screening  -     Case request GI: COLONOSCOPY    Osteoporosis screening  -     DXA Bone Density Spine And Hip; Future; Expected date: 11/13/2018    Other orders  -     fluticasone (FLONASE) 50 mcg/actuation nasal spray; 2 sprays (100 mcg total) by Each Nare route once daily.  Dispense: 1 Bottle; Refill: 3    BP stble, cont meds  Update labs today, orders in.  Pt was not controlled on prior statin, was increased last visit for better control.  Monitor CKD, avoid NSAIDs.  Stay well hydrated.  Med refill  Needs DEXA and colonoscopy, orders in  F/u 6 mos

## 2018-11-16 ENCOUNTER — TELEPHONE (OUTPATIENT)
Dept: ENDOSCOPY | Facility: HOSPITAL | Age: 68
End: 2018-11-16

## 2018-11-16 NOTE — TELEPHONE ENCOUNTER
Pt stated that she was too busy to schedule procedure right now; gave pt phone number to office so that she can call at a better time

## 2018-11-19 ENCOUNTER — TELEPHONE (OUTPATIENT)
Dept: INTERNAL MEDICINE | Facility: CLINIC | Age: 68
End: 2018-11-19

## 2018-11-19 ENCOUNTER — TELEPHONE (OUTPATIENT)
Dept: ENDOSCOPY | Facility: HOSPITAL | Age: 68
End: 2018-11-19

## 2018-11-19 NOTE — TELEPHONE ENCOUNTER
----- Message from Angeles Salazar sent at 11/19/2018  4:20 PM CST -----  Contact: Pt  Pt missed call back regarding colonoscopy appointment   ...[phone] (ssri)

## 2018-11-19 NOTE — TELEPHONE ENCOUNTER
Called pt and let her know that she will need to call Gastro dept for scheduling colonoscopy.  [phone].

## 2018-11-20 ENCOUNTER — DOCUMENTATION ONLY (OUTPATIENT)
Dept: ENDOSCOPY | Facility: HOSPITAL | Age: 68
End: 2018-11-20

## 2018-11-20 RX ORDER — SODIUM, POTASSIUM,MAG SULFATES 17.5-3.13G
SOLUTION, RECONSTITUTED, ORAL ORAL
Qty: 354 ML | Refills: 0 | Status: ON HOLD | OUTPATIENT
Start: 2018-11-20 | End: 2019-04-16 | Stop reason: HOSPADM

## 2018-11-20 NOTE — PROGRESS NOTES
Endoscopy Scheduling Questionnaire:    Call Type:Incoming call    1. Have you been admitted overnight to the hospital in the past 3 months? no  2. Do you get CP and SOB while walking up a flight of stairs? no  3. Have you had a stent placed in the past 12 months? no  4. Have you had a stroke or heart attack in the past 6 months? no  5. Have you had any chest pain in the past 3 months? no      If so, have you been evaluated by your PCP or Cardiologist? no  6. Do you take weight loss medications? no  7. Have you been diagnosed with Diverticulitis within the past 3 months? no  8. Are you having any GI symptoms that you feel need to be evaluated prior to your procedure? no  9. Are you on dialysis? no  10. Are you diabetic? no  11. Do you have any other health issues that you feel might limit your ability to safely have the procedure and/or sedation? no  12. Is the patient over 81 yo? no        If so, has the patient been seen by their PCP or GI in the last 3 months? N/A       -I have reviewed the last colonoscopy for recommendations regarding surveillance and bowel prep  yes  -I have reviewed the patient's medications and allergies. She is on high risk medications and will require cardiac clearance. A clearance request was sent to Dr Hopper within 30 days of procedure.  -I have verified the pharmacy information. The prep being used is Suprep. The patient's prep instructions were sent via mail..    Date Endoscopy Scheduled: (1/10/19)

## 2018-11-26 ENCOUNTER — TELEPHONE (OUTPATIENT)
Dept: GASTROENTEROLOGY | Facility: CLINIC | Age: 68
End: 2018-11-26

## 2018-12-17 ENCOUNTER — TELEPHONE (OUTPATIENT)
Dept: ENDOSCOPY | Facility: HOSPITAL | Age: 68
End: 2018-12-17

## 2018-12-17 NOTE — TELEPHONE ENCOUNTER
Please advise pt may hold Plavix 5 days prior to endoscopy procedure. Procedure is scheduled on 1/10/19.

## 2018-12-24 ENCOUNTER — TELEPHONE (OUTPATIENT)
Dept: ENDOSCOPY | Facility: HOSPITAL | Age: 68
End: 2018-12-24

## 2019-01-08 ENCOUNTER — TELEPHONE (OUTPATIENT)
Dept: FAMILY MEDICINE | Facility: CLINIC | Age: 69
End: 2019-01-08

## 2019-01-18 ENCOUNTER — TELEPHONE (OUTPATIENT)
Dept: INTERNAL MEDICINE | Facility: CLINIC | Age: 69
End: 2019-01-18

## 2019-01-18 ENCOUNTER — TELEPHONE (OUTPATIENT)
Dept: ENDOSCOPY | Facility: HOSPITAL | Age: 69
End: 2019-01-18

## 2019-01-18 NOTE — TELEPHONE ENCOUNTER
Return called in regards to scheduling a procedure. Left patient a message to call us at our office when she is available to do so.

## 2019-01-18 NOTE — TELEPHONE ENCOUNTER
----- Message from Tonia Langford sent at 1/18/2019  1:14 PM CST -----  Contact: pt  The pt request a call to schedule a colonoscopy appt, the pt can be reached at [phone]///[id number]

## 2019-02-25 ENCOUNTER — TELEPHONE (OUTPATIENT)
Dept: INTERNAL MEDICINE | Facility: CLINIC | Age: 69
End: 2019-02-25

## 2019-02-25 ENCOUNTER — HOSPITAL ENCOUNTER (OUTPATIENT)
Dept: RADIOLOGY | Facility: HOSPITAL | Age: 69
Discharge: HOME | End: 2019-02-25
Attending: FAMILY MEDICINE
Payer: MEDICARE

## 2019-02-25 VITALS — WEIGHT: 189 LBS | BODY MASS INDEX: 32.27 KG/M2 | HEIGHT: 64 IN

## 2019-02-25 DIAGNOSIS — Z12.39 BREAST CANCER SCREENING: ICD-10-CM

## 2019-02-25 DIAGNOSIS — Z12.39 BREAST CANCER SCREENING: Primary | ICD-10-CM

## 2019-02-25 PROCEDURE — 77063 MAMMO DIGITAL SCREENING BILAT WITH TOMOSYNTHESIS_CAD: ICD-10-PCS | Mod: 26,HCNC,, | Performed by: RADIOLOGY

## 2019-02-25 PROCEDURE — 77063 BREAST TOMOSYNTHESIS BI: CPT | Mod: 26,HCNC,, | Performed by: RADIOLOGY

## 2019-02-25 PROCEDURE — 77067 SCR MAMMO BI INCL CAD: CPT | Mod: TC,HCNC

## 2019-02-25 PROCEDURE — 77067 MAMMO DIGITAL SCREENING BILAT WITH TOMOSYNTHESIS_CAD: ICD-10-PCS | Mod: 26,HCNC,, | Performed by: RADIOLOGY

## 2019-02-25 PROCEDURE — 77067 SCR MAMMO BI INCL CAD: CPT | Mod: 26,HCNC,, | Performed by: RADIOLOGY

## 2019-02-25 NOTE — TELEPHONE ENCOUNTER
----- Message from Mariann Teresa sent at 2/25/2019 11:59 AM CST -----  Contact: pt  States she needs to schedule a mammogram. Please call pt at [phone] or [phone]. Thank you

## 2019-02-27 ENCOUNTER — HOSPITAL ENCOUNTER (OUTPATIENT)
Dept: RADIOLOGY | Facility: HOSPITAL | Age: 69
Discharge: HOME | End: 2019-02-27
Attending: FAMILY MEDICINE
Payer: MEDICARE

## 2019-02-27 ENCOUNTER — TELEPHONE (OUTPATIENT)
Dept: ENDOSCOPY | Facility: HOSPITAL | Age: 69
End: 2019-02-27

## 2019-02-27 DIAGNOSIS — R92.8 ABNORMAL MAMMOGRAM: ICD-10-CM

## 2019-02-27 PROCEDURE — 76642 ULTRASOUND BREAST LIMITED: CPT | Mod: 26,HCNC,RT, | Performed by: RADIOLOGY

## 2019-02-27 PROCEDURE — 77061 BREAST TOMOSYNTHESIS UNI: CPT | Mod: TC,HCNC

## 2019-02-27 PROCEDURE — 77065 DX MAMMO INCL CAD UNI: CPT | Mod: TC,HCNC

## 2019-02-27 PROCEDURE — 76642 ULTRASOUND BREAST LIMITED: CPT | Mod: TC,HCNC,RT

## 2019-02-27 PROCEDURE — 76642 US BREAST RIGHT LIMITED: ICD-10-PCS | Mod: 26,HCNC,RT, | Performed by: RADIOLOGY

## 2019-02-27 PROCEDURE — 77061 MAMMO DIGITAL DIAGNOSTIC RIGHT WITH TOMOSYNTHESIS_CAD: ICD-10-PCS | Mod: 26,HCNC,, | Performed by: RADIOLOGY

## 2019-02-27 PROCEDURE — 77061 BREAST TOMOSYNTHESIS UNI: CPT | Mod: 26,HCNC,, | Performed by: RADIOLOGY

## 2019-02-27 PROCEDURE — 77065 MAMMO DIGITAL DIAGNOSTIC RIGHT WITH TOMOSYNTHESIS_CAD: ICD-10-PCS | Mod: 26,HCNC,, | Performed by: RADIOLOGY

## 2019-02-27 PROCEDURE — 77065 DX MAMMO INCL CAD UNI: CPT | Mod: 26,HCNC,, | Performed by: RADIOLOGY

## 2019-02-27 NOTE — TELEPHONE ENCOUNTER
----- Message from Yuval Babcock sent at 2/27/2019  9:16 AM CST -----  Contact: pt   Type:  Needs Medical Advice    Who Called: GILSON RODRIGUEZ   Symptoms (please be specific):   How long has patient had these symptoms:   Pharmacy name and phone #:    Would the patient rather a call back or a response via [facility]? Callback   Best Call Back Number: [phone]  Additional Information: the pt is ready to reschedule her colonoscopy that she canceled

## 2019-02-27 NOTE — TELEPHONE ENCOUNTER
Endoscopy Scheduling Questionnaire:    Call Type:Outgoing call    1. Have you been admitted overnight to the hospital in the past 3 months? no  2. Do you get CP and SOB while walking up a flight of stairs? no  3. Have you had a stent placed in the past 12 months? no  4. Have you had a stroke or heart attack in the past 6 months? no  5. Have you had any chest pain in the past 3 months? no      If so, have you been evaluated by your PCP or Cardiologist? no  6. Do you take weight loss medications? no  7. Have you been diagnosed with Diverticulitis within the past 3 months? no  8. Are you having any GI symptoms that you feel need to be evaluated prior to your procedure? no  9. Are you on dialysis? no  10. Are you diabetic? no  11. Do you have any other health issues that you feel might limit your ability to safely have the procedure and/or sedation? no  12. Is the patient over 81 yo? no        If so, has the patient been seen by their PCP or GI in the last 3 months? N/A       -I have reviewed the last colonoscopy for recommendations regarding surveillance and bowel prep  Yes  -I have reviewed the patient's medications and allergies. She is on high risk medications and will require cardiac clearance. A clearance request NA  -I have verified the pharmacy information. The prep being used is Suprep. The patient's prep instructions were sent via mail..    Date Endoscopy Scheduled: Date: 4/16/19  Or  Date Gastro office visit Scheduled: NA

## 2019-03-15 ENCOUNTER — PES CALL (OUTPATIENT)
Dept: ADMINISTRATIVE | Facility: CLINIC | Age: 69
End: 2019-03-15

## 2019-03-16 ENCOUNTER — HOSPITAL ENCOUNTER (EMERGENCY)
Facility: HOSPITAL | Age: 69
Discharge: HOME | End: 2019-03-16
Attending: FAMILY MEDICINE
Payer: MEDICARE

## 2019-03-16 VITALS
DIASTOLIC BLOOD PRESSURE: 77 MMHG | SYSTOLIC BLOOD PRESSURE: 174 MMHG | BODY MASS INDEX: 32.8 KG/M2 | TEMPERATURE: 98 F | OXYGEN SATURATION: 100 % | RESPIRATION RATE: 18 BRPM | HEIGHT: 65 IN | HEART RATE: 71 BPM | WEIGHT: 196.88 LBS

## 2019-03-16 DIAGNOSIS — R55 NEAR SYNCOPE: Primary | ICD-10-CM

## 2019-03-16 LAB
ALBUMIN SERPL BCP-MCNC: 3.6 G/DL
ALP SERPL-CCNC: 75 U/L
ALT SERPL W/O P-5'-P-CCNC: 8 U/L
ANION GAP SERPL CALC-SCNC: 11 MMOL/L
AST SERPL-CCNC: 13 U/L
BASOPHILS # BLD AUTO: 0.02 K/UL
BASOPHILS NFR BLD: 0.2 %
BILIRUB SERPL-MCNC: 0.2 MG/DL
BILIRUB UR QL STRIP: NEGATIVE
BNP SERPL-MCNC: 43 PG/ML
BUN SERPL-MCNC: 13 MG/DL
CALCIUM SERPL-MCNC: 9.4 MG/DL
CHLORIDE SERPL-SCNC: 109 MMOL/L
CK SERPL-CCNC: 168 U/L
CLARITY UR: ABNORMAL
CO2 SERPL-SCNC: 23 MMOL/L
COLOR UR: YELLOW
CREAT SERPL-MCNC: 1 MG/DL
DIFFERENTIAL METHOD: ABNORMAL
EOSINOPHIL # BLD AUTO: 0.1 K/UL
EOSINOPHIL NFR BLD: 1.1 %
ERYTHROCYTE [DISTWIDTH] IN BLOOD BY AUTOMATED COUNT: 14.1 %
EST. GFR  (AFRICAN AMERICAN): >60 ML/MIN/1.73 M^2
EST. GFR  (NON AFRICAN AMERICAN): 58 ML/MIN/1.73 M^2
GLUCOSE SERPL-MCNC: 104 MG/DL
GLUCOSE UR QL STRIP: NEGATIVE
HCT VFR BLD AUTO: 35.5 %
HGB BLD-MCNC: 11.6 G/DL
HGB UR QL STRIP: NEGATIVE
INR PPP: 0.9
KETONES UR QL STRIP: NEGATIVE
LEUKOCYTE ESTERASE UR QL STRIP: NEGATIVE
LYMPHOCYTES # BLD AUTO: 2 K/UL
LYMPHOCYTES NFR BLD: 24.3 %
MAGNESIUM SERPL-MCNC: 1.9 MG/DL
MCH RBC QN AUTO: 29.1 PG
MCHC RBC AUTO-ENTMCNC: 32.7 G/DL
MCV RBC AUTO: 89 FL
MONOCYTES # BLD AUTO: 0.7 K/UL
MONOCYTES NFR BLD: 9 %
NEUTROPHILS # BLD AUTO: 5.4 K/UL
NEUTROPHILS NFR BLD: 65.4 %
NITRITE UR QL STRIP: NEGATIVE
PH UR STRIP: 6 [PH] (ref 5–8)
PLATELET # BLD AUTO: 256 K/UL
PMV BLD AUTO: 11.8 FL
POCT GLUCOSE: 98 MG/DL (ref 70–110)
POTASSIUM SERPL-SCNC: 4 MMOL/L
PROT SERPL-MCNC: 7.2 G/DL
PROT UR QL STRIP: NEGATIVE
PROTHROMBIN TIME: 10.2 SEC
RBC # BLD AUTO: 3.98 M/UL
SODIUM SERPL-SCNC: 143 MMOL/L
SP GR UR STRIP: 1.02 (ref 1–1.03)
TROPONIN I SERPL DL<=0.01 NG/ML-MCNC: <0.006 NG/ML
TSH SERPL DL<=0.005 MIU/L-ACNC: 2.91 UIU/ML
URN SPEC COLLECT METH UR: ABNORMAL
UROBILINOGEN UR STRIP-ACNC: NEGATIVE EU/DL
WBC # BLD AUTO: 8.23 K/UL

## 2019-03-16 PROCEDURE — 99285 EMERGENCY DEPT VISIT HI MDM: CPT | Mod: 25,HCNC

## 2019-03-16 PROCEDURE — 36415 COLL VENOUS BLD VENIPUNCTURE: CPT | Mod: HCNC

## 2019-03-16 PROCEDURE — 85610 PROTHROMBIN TIME: CPT | Mod: HCNC

## 2019-03-16 PROCEDURE — 81003 URINALYSIS AUTO W/O SCOPE: CPT | Mod: HCNC

## 2019-03-16 PROCEDURE — 85025 COMPLETE CBC W/AUTO DIFF WBC: CPT | Mod: HCNC

## 2019-03-16 PROCEDURE — 83735 ASSAY OF MAGNESIUM: CPT | Mod: HCNC

## 2019-03-16 PROCEDURE — 93010 EKG 12-LEAD: ICD-10-PCS | Mod: HCNC,,, | Performed by: INTERNAL MEDICINE

## 2019-03-16 PROCEDURE — 80053 COMPREHEN METABOLIC PANEL: CPT | Mod: HCNC

## 2019-03-16 PROCEDURE — 82962 GLUCOSE BLOOD TEST: CPT | Mod: HCNC

## 2019-03-16 PROCEDURE — 82550 ASSAY OF CK (CPK): CPT | Mod: HCNC

## 2019-03-16 PROCEDURE — 83880 ASSAY OF NATRIURETIC PEPTIDE: CPT | Mod: HCNC

## 2019-03-16 PROCEDURE — 84484 ASSAY OF TROPONIN QUANT: CPT | Mod: HCNC

## 2019-03-16 PROCEDURE — 93005 ELECTROCARDIOGRAM TRACING: CPT | Mod: HCNC

## 2019-03-16 PROCEDURE — 84443 ASSAY THYROID STIM HORMONE: CPT | Mod: HCNC

## 2019-03-16 PROCEDURE — 93010 ELECTROCARDIOGRAM REPORT: CPT | Mod: HCNC,,, | Performed by: INTERNAL MEDICINE

## 2019-03-17 NOTE — ED NOTES
The pt was eating with her family and had a glass of wine when she became dizzy and appeared to have a syncopal episode. Pt denied LOC and did not fall. The pt has a history of CVA ten years ago. The pt also has a c/o diarrhea three days ago. The pt denies feeling lightheaded or dizzy at this time.She is AAOx four and in no apparent distress at this time. The pts  is at bedside

## 2019-03-17 NOTE — ED PROVIDER NOTES
SCRIBE #1 NOTE: I, Angie Guy, am scribing for, and in the presence of, Kyara Gruber MD. I have scribed the entire note.       History     Chief Complaint   Patient presents with    near syncope     episode of feeling weak after drinking glass of wine     Review of patient's allergies indicates:  No Known Allergies      History of Present Illness     HPI    3/16/2019, 8:21 PM  History obtained from the patient      History of Present Illness: Tracy Najera is a 68 y.o. female patient with a PMHx of HTN, HLD, CVA, and arthritis who presents to the Emergency Department for evaluation of lightheadedness which onset suddenly this evening. Pt states that she had a few sips of wine, sat up from a sitting postion, became dizzy, and felt lightheaded. Pt reports that the room felt as if it were spinning. Pt states that she has had similar episodes in the past when she did not eat enough. Symptoms are constant and moderate in severity. No mitigating or exacerbating factors reported. Associated sxs include generalized weakness, nausea, and diarrhea. Pt's HLD medicine was increased two to three months ago. Patient denies any CP, appetite changes, SOB, LOC, syncope, emesis, cough, HA, numbness, localized weakness, congestion, fever, chills, hx of DM, and all other sxs at this time.  No further complaints or concerns at this time.         Arrival mode: Personal vehicle      PCP: Ryan Hopper MD        Past Medical History:  Past Medical History:   Diagnosis Date    Anticoagulant long-term use     Plavix    Arthritis     CVA (cerebral vascular accident) 2008    R hemiparesis, R hand contracture    HTN (hypertension)     Hyperlipidemia        Past Surgical History:  Past Surgical History:   Procedure Laterality Date    BREAST BIOPSY Left 2017    COLONOSCOPY N/A 9/30/2013    Performed by [facility] at [facility] ENDO    EXCISION-DUCT Left 6/1/2017    Performed by Bernard John MD at [facility] OR     HYSTERECTOMY      partial     TONSILLECTOMY           Family History:  Family History   Problem Relation Age of Onset    Hypertension Mother     Hypertension Sister     Stroke Brother     Cancer Neg Hx        Social History:  Social History     Tobacco Use    Smoking status: Never Smoker    Smokeless tobacco: Never Used   Substance and Sexual Activity    Alcohol use: Yes     Alcohol/week: 0.0 oz     Comment: occ    Drug use: No    Sexual activity: Yes     Partners: Male        Review of Systems     Review of Systems   Constitutional: Negative for appetite change, chills and fever.        + generalized weakness   HENT: Negative for congestion and sore throat.    Respiratory: Negative for cough and shortness of breath.    Cardiovascular: Negative for chest pain.   Gastrointestinal: Positive for diarrhea and nausea. Negative for vomiting.   Genitourinary: Negative for dysuria.   Musculoskeletal: Negative for back pain.   Skin: Negative for rash.   Neurological: Positive for dizziness and light-headedness. Negative for syncope, weakness, numbness and headaches.   Hematological: Does not bruise/bleed easily.   All other systems reviewed and are negative.     Physical Exam     Initial Vitals [03/16/19 1911]   BP Pulse Resp Temp SpO2   (!) 142/100 86 16 97.8 °F (36.6 °C) 98 %      MAP       --          Physical Exam  Nursing Notes and Vital Signs Reviewed.  Constitutional: Patient is in no apparent distress. Well-developed and well-nourished.  Head: Atraumatic. Normocephalic.  Eyes: PERRL. EOM intact. Conjunctivae are not pale. No scleral icterus.  ENT: Dry mucous membranes. Oropharynx is clear and symmetric.    Neck: Supple. Full ROM. No lymphadenopathy.  Cardiovascular: Regular rate. Regular rhythm. No murmurs, rubs, or gallops. Distal pulses are 2+ and symmetric.  Pulmonary/Chest: No respiratory distress. Clear to auscultation bilaterally. No wheezing or rales.  Abdominal: Soft and non-distended.  There is no  "tenderness.  No rebound, guarding, or rigidity. Good bowel sounds.  Genitourinary: No CVA tenderness  Musculoskeletal: Moves all extremities. No obvious deformities. No edema. No calf tendernes.  Skin: Warm and dry.  Neurological: Patient is alert and oriented to person, place and time. Pupils ERRL and EOM normal. Cranial nerves II-XII are intact. Strength is full bilaterally; it is equal and 5/5 in bilateral upper and lower extremities. There is no pronator drift of outstretched arms. Light touch sense is intact. Speech is clear and normal. No acute focal neurological deficits noted.  Psychiatric: Normal affect. Good eye contact. Appropriate in content.     ED Course   Procedures  ED Vital Signs:  Vitals:    03/16/19 1911 03/16/19 1919 03/16/19 1922 03/16/19 1924   BP: (!) 142/100  (!) 155/85 (!) 162/84   Pulse: 86 89 76 80   Resp: 16      Temp: 97.8 °F (36.6 °C)      TempSrc: Oral      SpO2: 98%      Weight:       Height: 5' 5" (1.651 m)       03/16/19 1926 03/16/19 1940 03/16/19 2000 03/16/19 2018   BP: (!) 170/91  (!) 157/70    Pulse: 82 80 74    Resp:   17    Temp:       TempSrc:       SpO2:   100%    Weight:    89.3 kg (196 lb 13.9 oz)   Height:        03/16/19 2100 03/16/19 2200 03/16/19 2300   BP: (!) 161/78 (!) 178/80 (!) 174/77   Pulse: 72 69 71   Resp: 17 16 18   Temp:   97.5 °F (36.4 °C)   TempSrc:   Oral   SpO2: 99% 100% 100%   Weight:      Height:          Abnormal Lab Results:  Labs Reviewed   CBC W/ AUTO DIFFERENTIAL - Abnormal; Notable for the following components:       Result Value    RBC 3.98 (*)     Hemoglobin 11.6 (*)     Hematocrit 35.5 (*)     All other components within normal limits   COMPREHENSIVE METABOLIC PANEL - Abnormal; Notable for the following components:    ALT 8 (*)     eGFR if non  58 (*)     All other components within normal limits   URINALYSIS, REFLEX TO URINE CULTURE - Abnormal; Notable for the following components:    Appearance, UA Hazy (*)     All other " components within normal limits    Narrative:     Preferred Collection Type->Urine, Clean Catch   B-TYPE NATRIURETIC PEPTIDE   TROPONIN I   PROTIME-INR   B-TYPE NATRIURETIC PEPTIDE   MAGNESIUM   TSH   CK   TSH   MAGNESIUM   CK   POCT GLUCOSE        All Lab Results:  Results for orders placed or performed during the hospital encounter of 03/16/19   CBC auto differential   Result Value Ref Range    WBC 8.23 3.90 - 12.70 K/uL    RBC 3.98 (L) 4.00 - 5.40 M/uL    Hemoglobin 11.6 (L) 12.0 - 16.0 g/dL    Hematocrit 35.5 (L) 37.0 - 48.5 %    MCV 89 82 - 98 fL    MCH 29.1 27.0 - 31.0 pg    MCHC 32.7 32.0 - 36.0 g/dL    RDW 14.1 11.5 - 14.5 %    Platelets 256 150 - 350 K/uL    MPV 11.8 9.2 - 12.9 fL    Gran # (ANC) 5.4 1.8 - 7.7 K/uL    Lymph # 2.0 1.0 - 4.8 K/uL    Mono # 0.7 0.3 - 1.0 K/uL    Eos # 0.1 0.0 - 0.5 K/uL    Baso # 0.02 0.00 - 0.20 K/uL    Gran% 65.4 38.0 - 73.0 %    Lymph% 24.3 18.0 - 48.0 %    Mono% 9.0 4.0 - 15.0 %    Eosinophil% 1.1 0.0 - 8.0 %    Basophil% 0.2 0.0 - 1.9 %    Differential Method Automated    Comprehensive metabolic panel   Result Value Ref Range    Sodium 143 136 - 145 mmol/L    Potassium 4.0 3.5 - 5.1 mmol/L    Chloride 109 95 - 110 mmol/L    CO2 23 23 - 29 mmol/L    Glucose 104 70 - 110 mg/dL    BUN, Bld 13 8 - 23 mg/dL    Creatinine 1.0 0.5 - 1.4 mg/dL    Calcium 9.4 8.7 - 10.5 mg/dL    Total Protein 7.2 6.0 - 8.4 g/dL    Albumin 3.6 3.5 - 5.2 g/dL    Total Bilirubin 0.2 0.1 - 1.0 mg/dL    Alkaline Phosphatase 75 55 - 135 U/L    AST 13 10 - 40 U/L    ALT 8 (L) 10 - 44 U/L    Anion Gap 11 8 - 16 mmol/L    eGFR if African American >60 >60 mL/min/1.73 m^2    eGFR if non African American 58 (A) >60 mL/min/1.73 m^2   Brain natriuretic peptide   Result Value Ref Range    BNP 43 0 - 99 pg/mL   Troponin I   Result Value Ref Range    Troponin I <0.006 0.000 - 0.026 ng/mL   Protime-INR   Result Value Ref Range    Prothrombin Time 10.2 9.0 - 12.5 sec    INR 0.9 0.8 - 1.2   Urinalysis, Reflex to  Urine Culture Urine, Clean Catch   Result Value Ref Range    Specimen UA Urine, Clean Catch     Color, UA Yellow Yellow, Straw, Vale    Appearance, UA Hazy (A) Clear    pH, UA 6.0 5.0 - 8.0    Specific Gravity, UA 1.020 1.005 - 1.030    Protein, UA Negative Negative    Glucose, UA Negative Negative    Ketones, UA Negative Negative    Bilirubin (UA) Negative Negative    Occult Blood UA Negative Negative    Nitrite, UA Negative Negative    Urobilinogen, UA Negative <2.0 EU/dL    Leukocytes, UA Negative Negative   TSH   Result Value Ref Range    TSH 2.913 0.400 - 4.000 uIU/mL   Magnesium   Result Value Ref Range    Magnesium 1.9 1.6 - 2.6 mg/dL   CK   Result Value Ref Range     20 - 180 U/L   POCT glucose   Result Value Ref Range    POCT Glucose 98 70 - 110 mg/dL         Imaging Results:  Imaging Results          CT Head Without Contrast (Final result)  Result time 03/16/19 21:21:08    Final result by Charles Beebe Jr., MD (03/16/19 21:21:08)                 Impression:      1. No acute findings.  2. Chronic lacunar infarcts and changes of mild chronic microvascular ischemia as described.  All CT scans at this facility use dose modulation, iterative reconstruction, and/or weight base dosing when appropriate to reduce radiation dose to as low as reasonably achievable.      Electronically signed by: Charles Beebe Jr., MD  Date:    03/16/2019  Time:    21:21             Narrative:    EXAMINATION:  CT HEAD WITHOUT CONTRAST    CLINICAL HISTORY:  Confusion/delirium, altered LOC, unexplained;    TECHNIQUE:  Contiguous axial images were obtained from the skull base through the vertex without intravenous contrast.    COMPARISON:  None    FINDINGS:  No intracranial hemorrhage. No mass effect or midline shift. No extra axial fluid collections. There are chronic lacunar infarcts within the left centrum semiovale extending into the left corona radiata.  Additional chronic lacunar infarct within the anterior aspect of the  right external capsule.  Patchy low density involves the periventricular white matter.  The ventricles and sulci are normal in size and configuration.  The paranasal sinuses and mastoid air cells are clear. No fractures are identified. No concerning osseous lesions.                                 The EKG was ordered, reviewed, and independently interpreted by the ED provider.  Interpretation time: 19:53  Rate: 79 BPM  Rhythm: normal sinus rhythm  Interpretation: LVH. Possible left atrial enlargement. No STEMI.         The Emergency Provider reviewed the vital signs and test results, which are outlined above.     ED Discussion     11:34 PM: Reassessed pt at this time.  Pt states her condition has improved at this time. Pt states that she did not experience any lightheadedness at ED. Discussed with pt all pertinent ED information and results. Discussed pt dx and plan of tx. Gave pt all f/u and return to the ED instructions. All questions and concerns were addressed at this time. Pt expresses understanding of information and instructions, and is comfortable with plan to discharge. Pt is stable for discharge.    Patient presents with a syncopal or near-syncopal episode. I have no suspicion that the event is secondary to an arrhythmia such as WPW, prolonged QT syndrome, or ventricular tachycardia. I have no suspicion for a seizure episode, intracranial hemorrhage, pulmonary embolus, myocardial infarction, sepsis, ectopic pregnancy, hemorrhagic shock, or hypoglycemia. Based on my evaluation, there is no emergent medical condition. Syncope precautions were discussed with the patient and/or caretaker, specifically not to swim or bathe unattended, not to operate motor vehicles or other machinery, and to avoid heights or other areas where falls may occur until cleared by primary care physician. Patient is safe for discharge.      I discussed with patient and/or family/caretaker that evaluation in the ED does not suggest any  emergent or life threatening medical conditions requiring immediate intervention beyond what was provided in the ED, and I believe patient is safe for discharge.  Regardless, an unremarkable evaluation in the ED does not preclude the development or presence of a serious of life threatening condition. As such, patient was instructed to return immediately for any worsening or change in current symptoms.    ED Course as of Mar 16 2335   Sat Mar 16, 2019   2114 Orthostatics are negative  [JONH]      ED Course User Index  [JONH] Kyara Gruber MD       ED Medication(s):  Medications - No data to display    Current Discharge Medication List          Follow-up Information     Ryan Hopper MD. Schedule an appointment as soon as possible for a visit in 2 days.    Specialty:  Family Medicine  Contact information:  [address] [place] [address] [phone]                         Medical Decision Making:   Clinical Tests:   Lab Tests: Ordered and Reviewed  Radiological Study: Ordered and Reviewed  Medical Tests: Reviewed and Ordered             Scribe Attestation:   Scribe #1: I performed the above scribed service and the documentation accurately describes the services I performed. I attest to the accuracy of the note.     Attending:   Physician Attestation Statement for Scribe #1: I, Kyara Gruber MD, personally performed the services described in this documentation, as scribed by Angie Guy, in my presence, and it is both accurate and complete.           Clinical Impression       ICD-10-CM ICD-9-CM   1. Near syncope R55 780.2       Disposition:   Disposition: Discharged  Condition: Stable         Kyara Gruber MD  03/18/19 6678

## 2019-03-19 ENCOUNTER — OFFICE VISIT (OUTPATIENT)
Dept: INTERNAL MEDICINE | Facility: CLINIC | Age: 69
End: 2019-03-19
Payer: MEDICARE

## 2019-03-19 ENCOUNTER — TELEPHONE (OUTPATIENT)
Dept: ENDOSCOPY | Facility: HOSPITAL | Age: 69
End: 2019-03-19

## 2019-03-19 VITALS
DIASTOLIC BLOOD PRESSURE: 84 MMHG | SYSTOLIC BLOOD PRESSURE: 158 MMHG | BODY MASS INDEX: 35.38 KG/M2 | HEIGHT: 62 IN | WEIGHT: 192.25 LBS | OXYGEN SATURATION: 98 % | HEART RATE: 92 BPM

## 2019-03-19 DIAGNOSIS — E66.01 SEVERE OBESITY (BMI 35.0-39.9) WITH COMORBIDITY: ICD-10-CM

## 2019-03-19 DIAGNOSIS — I77.1 TORTUOUS AORTA: ICD-10-CM

## 2019-03-19 DIAGNOSIS — M85.80 OSTEOPENIA, UNSPECIFIED LOCATION: ICD-10-CM

## 2019-03-19 DIAGNOSIS — I69.30 HISTORY OF CVA WITH RESIDUAL DEFICIT: ICD-10-CM

## 2019-03-19 DIAGNOSIS — E78.5 HYPERLIPIDEMIA, UNSPECIFIED HYPERLIPIDEMIA TYPE: ICD-10-CM

## 2019-03-19 DIAGNOSIS — Z00.00 ENCOUNTER FOR PREVENTIVE HEALTH EXAMINATION: Primary | ICD-10-CM

## 2019-03-19 DIAGNOSIS — I10 ESSENTIAL HYPERTENSION: ICD-10-CM

## 2019-03-19 PROBLEM — E66.811 OBESITY (BMI 30.0-34.9): Status: ACTIVE | Noted: 2019-03-19

## 2019-03-19 PROBLEM — E66.9 OBESITY (BMI 30.0-34.9): Status: ACTIVE | Noted: 2019-03-19

## 2019-03-19 PROCEDURE — 3079F DIAST BP 80-89 MM HG: CPT | Mod: HCNC,CPTII,S$GLB, | Performed by: NURSE PRACTITIONER

## 2019-03-19 PROCEDURE — 99999 PR PBB SHADOW E&M-EST. PATIENT-LVL III: CPT | Mod: PBBFAC,HCNC,, | Performed by: NURSE PRACTITIONER

## 2019-03-19 PROCEDURE — 99499 RISK ADDL DX/OHS AUDIT: ICD-10-PCS | Mod: HCNC,S$GLB,, | Performed by: NURSE PRACTITIONER

## 2019-03-19 PROCEDURE — 99999 PR PBB SHADOW E&M-EST. PATIENT-LVL III: ICD-10-PCS | Mod: PBBFAC,HCNC,, | Performed by: NURSE PRACTITIONER

## 2019-03-19 PROCEDURE — 99499 UNLISTED E&M SERVICE: CPT | Mod: HCNC,S$GLB,, | Performed by: NURSE PRACTITIONER

## 2019-03-19 PROCEDURE — G0439 PPPS, SUBSEQ VISIT: HCPCS | Mod: HCNC,S$GLB,, | Performed by: NURSE PRACTITIONER

## 2019-03-19 PROCEDURE — 3077F PR MOST RECENT SYSTOLIC BLOOD PRESSURE >= 140 MM HG: ICD-10-PCS | Mod: HCNC,CPTII,S$GLB, | Performed by: NURSE PRACTITIONER

## 2019-03-19 PROCEDURE — 3079F PR MOST RECENT DIASTOLIC BLOOD PRESSURE 80-89 MM HG: ICD-10-PCS | Mod: HCNC,CPTII,S$GLB, | Performed by: NURSE PRACTITIONER

## 2019-03-19 PROCEDURE — G0439 PR MEDICARE ANNUAL WELLNESS SUBSEQUENT VISIT: ICD-10-PCS | Mod: HCNC,S$GLB,, | Performed by: NURSE PRACTITIONER

## 2019-03-19 PROCEDURE — 3077F SYST BP >= 140 MM HG: CPT | Mod: HCNC,CPTII,S$GLB, | Performed by: NURSE PRACTITIONER

## 2019-03-19 NOTE — PROGRESS NOTES
"Tracy Najera presented for a  Medicare AWV and comprehensive Health Risk Assessment today. The following components were reviewed and updated:    · Medical history  · Family History  · Social history  · Allergies and Current Medications  · Health Risk Assessment  · Health Maintenance  · Care Team     ** See Completed Assessments for Annual Wellness Visit within the encounter summary.**       The following assessments were completed:  · Living Situation  · CAGE  · Depression Screening  · Timed Get Up and Go  · Whisper Test  · Cognitive Function Screening  · Nutrition Screening  · ADL Screening  · PAQ Screening    Vitals:    03/19/19 1511   BP: (!) 158/84   Pulse: 92   SpO2: 98%   Weight: 87.2 kg (192 lb 3.9 oz)   Height: 5' 1.81" (1.57 m)     Body mass index is 35.38 kg/m².  Physical Exam   Constitutional: She is oriented to person, place, and time. She appears well-developed and well-nourished.   HENT:   Head: Normocephalic.   Cardiovascular: Normal rate, regular rhythm and normal heart sounds.   No murmur heard.  Pulmonary/Chest: Effort normal and breath sounds normal. No respiratory distress.   Abdominal: Soft. She exhibits no mass. There is no tenderness.   Musculoskeletal: Normal range of motion. She exhibits no edema.   Slight weakness noted to right upper extremity in comparison to left. bilateral lower extremity strength equal and intact.    Neurological: She is alert and oriented to person, place, and time. She exhibits normal muscle tone.   Skin: Skin is warm, dry and intact.   Psychiatric: Her speech is normal and behavior is normal. Her mood appears anxious.   Nursing note and vitals reviewed.        Diagnoses and health risks identified today and associated recommendations/orders:    1. Encounter for preventive health examination    2. Essential hypertension  BP elevated at today's visit. Reports she is a little nervous about visit. Reports she feels fine. Discussed s/s of MI and stroke (patient denies " any s/s) and advised to go to the ER if occur. Advised patient to monitor BP (keep a log) and if continues to stay elevated (greater than 140/90) to follow up with PCP for further evaluation and treatment.  She will set up digital HTN medicine today. She will follow up Thursday with internal medicine. She expressed understanding and is in agreement with plan of care.  - Hypertension Digital Medicine ([facility]) Enrollment Order  - Hypertension Digital Medicine ([facility]): Assign Onboarding Questionnaires    3. Hyperlipidemia, unspecified hyperlipidemia type  Stable. Continue current treatment plan as previously prescribed with your PCP.     4. Tortuous aorta  cxr 6/9/2018-Tortuous aorta.  Calcifications of the aortic knob.  Discussed diagnosis and risk reduction.   Stable and controlled. Continue current treatment plan as previously prescribed with your PCP.      5. Osteopenia, unspecified location  DEXA 11/14/2018  Continue current treatment plan as previously prescribed with your PCP.      6. History of CVA with residual deficit  Reports right sided weakness ongoing since CVA.   Continue current treatment plan as previously prescribed with your PCP.     7. Severe obesity (BMI 35.0-39.9) with comorbidity  Encouraged healthy diet and exercise as tolerated to help bring BMI into normal range.   Continue current treatment plan as previously prescribed with your PCP.     8. Abnormal whisper test.   Denies difficulty hearing.   Advised to follow up with PCP for further evaluation and recommendations. She expressed understanding.      Provided Tracy with a 5-10 year written screening schedule and personal prevention plan. Education, counseling, and referrals were provided as needed. After Visit Summary printed and given to patient which includes a list of additional screenings\tests needed.    Follow-up in about 1 year (around 3/19/2020) for AWV.    Sherine Singer NP

## 2019-03-19 NOTE — PATIENT INSTRUCTIONS
Counseling and Referral of Other Preventative  (Italic type indicates deductible and co-insurance are waived)    Patient Name: Tracy Najera  Today's Date: 3/19/2019    Health Maintenance       Date Due Completion Date    Colonoscopy 09/30/2018 9/30/2013    Lipid Panel 11/13/2019 11/13/2018    Mammogram 02/27/2020 2/27/2019    Override on 11/8/2016: Done    Override on 8/3/2015: Done (Done at [facility])    Override on 10/6/2014: Done (Done at [facility]--exact date unknown)    Override on 9/11/2013: (N/S)    Override on 8/19/2012: (N/S)    High Dose Statin 03/19/2020 3/19/2019    DEXA SCAN 11/14/2021 11/14/2018    Override on 11/2/2015: Done (Exact date unknown--done at [facility])    TETANUS VACCINE 10/29/2025 10/29/2015        Orders Placed This Encounter   Procedures    Hypertension Digital Medicine (HDMP) Enrollment Order     The following information is provided to all patients.  This information is to help you find resources for any of the problems found today that may be affecting your health:                Living healthy guide: www.[facility].[place].gov      Understanding Diabetes: www.diabetes.org      Eating healthy: www.cdc.gov/healthyweight      CDC home safety checklist: www.cdc.gov/steadi/patient.html      Agency on Aging: www.goea.[place].[facility]      Alcoholics anonymous (AA): www.aa.org      Physical Activity: www.elder.nih.gov/dp0twcf      Tobacco use: www.quitwithusla.org

## 2019-03-19 NOTE — TELEPHONE ENCOUNTER
Please advise pt may hold Plavix 5 days prior to endoscopy procedure. Procedure is scheduled on April 16, 2019.

## 2019-03-21 ENCOUNTER — OFFICE VISIT (OUTPATIENT)
Dept: INTERNAL MEDICINE | Facility: CLINIC | Age: 69
End: 2019-03-21
Payer: MEDICARE

## 2019-03-21 VITALS
RESPIRATION RATE: 18 BRPM | BODY MASS INDEX: 35.58 KG/M2 | WEIGHT: 193.31 LBS | OXYGEN SATURATION: 96 % | SYSTOLIC BLOOD PRESSURE: 160 MMHG | TEMPERATURE: 97 F | DIASTOLIC BLOOD PRESSURE: 90 MMHG | HEART RATE: 85 BPM

## 2019-03-21 DIAGNOSIS — I10 HYPERTENSION, UNSPECIFIED TYPE: Primary | ICD-10-CM

## 2019-03-21 PROCEDURE — 3080F PR MOST RECENT DIASTOLIC BLOOD PRESSURE >= 90 MM HG: ICD-10-PCS | Mod: HCNC,CPTII,S$GLB, | Performed by: FAMILY MEDICINE

## 2019-03-21 PROCEDURE — 99999 PR PBB SHADOW E&M-EST. PATIENT-LVL III: CPT | Mod: PBBFAC,HCNC,, | Performed by: FAMILY MEDICINE

## 2019-03-21 PROCEDURE — 99999 PR PBB SHADOW E&M-EST. PATIENT-LVL III: ICD-10-PCS | Mod: PBBFAC,HCNC,, | Performed by: FAMILY MEDICINE

## 2019-03-21 PROCEDURE — 3077F SYST BP >= 140 MM HG: CPT | Mod: HCNC,CPTII,S$GLB, | Performed by: FAMILY MEDICINE

## 2019-03-21 PROCEDURE — 99213 PR OFFICE/OUTPT VISIT, EST, LEVL III, 20-29 MIN: ICD-10-PCS | Mod: HCNC,S$GLB,, | Performed by: FAMILY MEDICINE

## 2019-03-21 PROCEDURE — 1101F PT FALLS ASSESS-DOCD LE1/YR: CPT | Mod: HCNC,CPTII,S$GLB, | Performed by: FAMILY MEDICINE

## 2019-03-21 PROCEDURE — 3080F DIAST BP >= 90 MM HG: CPT | Mod: HCNC,CPTII,S$GLB, | Performed by: FAMILY MEDICINE

## 2019-03-21 PROCEDURE — 1101F PR PT FALLS ASSESS DOC 0-1 FALLS W/OUT INJ PAST YR: ICD-10-PCS | Mod: HCNC,CPTII,S$GLB, | Performed by: FAMILY MEDICINE

## 2019-03-21 PROCEDURE — 99213 OFFICE O/P EST LOW 20 MIN: CPT | Mod: HCNC,S$GLB,, | Performed by: FAMILY MEDICINE

## 2019-03-21 PROCEDURE — 3077F PR MOST RECENT SYSTOLIC BLOOD PRESSURE >= 140 MM HG: ICD-10-PCS | Mod: HCNC,CPTII,S$GLB, | Performed by: FAMILY MEDICINE

## 2019-03-21 RX ORDER — AMLODIPINE BESYLATE 5 MG/1
5 TABLET ORAL DAILY
Qty: 30 TABLET | Refills: 0 | Status: SHIPPED | OUTPATIENT
Start: 2019-03-21 | End: 2019-04-12 | Stop reason: SDUPTHER

## 2019-03-21 NOTE — PROGRESS NOTES
Subjective:       Patient ID: Tracy Najera is a 68 y.o. female.    Chief Complaint: Establish Care    HPI     67 yo F    Presents to establish care  Pressure is high.    Reports pressure has been high.    Followed Dr. Hopper  Geographic issues  Too far          Review of Systems   Constitutional: Negative for activity change.   Eyes: Negative for discharge.   Respiratory: Negative for wheezing.    Cardiovascular: Negative for chest pain and palpitations.   Gastrointestinal: Negative for constipation, diarrhea and vomiting.   Genitourinary: Negative for difficulty urinating and hematuria.   Neurological: Negative for headaches.   Psychiatric/Behavioral: Negative for dysphoric mood.       Objective:       Vitals:    03/21/19 1054   BP: (!) 160/90   Pulse: 85   Resp: 18   Temp: 96.8 °F (36 °C)       Physical Exam   Constitutional: She is oriented to person, place, and time. She appears well-developed and well-nourished. She does not have a sickly appearance. No distress.   HENT:   Head: Normocephalic and atraumatic.   Right Ear: External ear normal.   Left Ear: External ear normal.   Eyes: Conjunctivae, EOM and lids are normal.   Neck: Trachea normal, normal range of motion and full passive range of motion without pain.   Cardiovascular: Normal rate, regular rhythm and normal heart sounds.   Pulmonary/Chest: Effort normal and breath sounds normal. No stridor. No respiratory distress.   Abdominal: Soft. Normal appearance and bowel sounds are normal. She exhibits no distension. There is no tenderness. There is no guarding. No hernia.   Musculoskeletal: Normal range of motion.   Lymphadenopathy:     She has no cervical adenopathy.   Neurological: She is alert and oriented to person, place, and time. She is not disoriented.   Skin: Skin is warm, dry and intact. No rash noted. She is not diaphoretic.   Psychiatric: She has a normal mood and affect. Her speech is normal and behavior is normal. Thought content normal.        Assessment:       1. Hypertension, unspecified type        Plan:   Hypertension, unspecified type    Losartan 100 mg  Will add amlodipine today 5 mg  F/u 7 days.  Reports she checks at home.  Digital medicine to be started    Next week -= further establish care / get to know her.    No Follow-up on file.

## 2019-04-04 ENCOUNTER — OFFICE VISIT (OUTPATIENT)
Dept: INTERNAL MEDICINE | Facility: CLINIC | Age: 69
End: 2019-04-04
Payer: MEDICARE

## 2019-04-04 ENCOUNTER — PATIENT MESSAGE (OUTPATIENT)
Dept: ADMINISTRATIVE | Facility: OTHER | Age: 69
End: 2019-04-04

## 2019-04-04 VITALS
RESPIRATION RATE: 20 BRPM | TEMPERATURE: 97 F | DIASTOLIC BLOOD PRESSURE: 88 MMHG | HEART RATE: 62 BPM | BODY MASS INDEX: 35.17 KG/M2 | HEIGHT: 62 IN | OXYGEN SATURATION: 97 % | WEIGHT: 191.13 LBS | SYSTOLIC BLOOD PRESSURE: 138 MMHG

## 2019-04-04 DIAGNOSIS — I10 HYPERTENSION, UNSPECIFIED TYPE: Primary | ICD-10-CM

## 2019-04-04 DIAGNOSIS — Z86.73 HISTORY OF CVA (CEREBROVASCULAR ACCIDENT): ICD-10-CM

## 2019-04-04 DIAGNOSIS — D64.9 ANEMIA, UNSPECIFIED TYPE: ICD-10-CM

## 2019-04-04 DIAGNOSIS — E78.5 HYPERLIPIDEMIA, UNSPECIFIED HYPERLIPIDEMIA TYPE: ICD-10-CM

## 2019-04-04 PROCEDURE — 3075F PR MOST RECENT SYSTOLIC BLOOD PRESS GE 130-139MM HG: ICD-10-PCS | Mod: HCNC,CPTII,S$GLB, | Performed by: FAMILY MEDICINE

## 2019-04-04 PROCEDURE — 1101F PT FALLS ASSESS-DOCD LE1/YR: CPT | Mod: HCNC,CPTII,S$GLB, | Performed by: FAMILY MEDICINE

## 2019-04-04 PROCEDURE — 99499 UNLISTED E&M SERVICE: CPT | Mod: S$GLB,,, | Performed by: FAMILY MEDICINE

## 2019-04-04 PROCEDURE — 3075F SYST BP GE 130 - 139MM HG: CPT | Mod: HCNC,CPTII,S$GLB, | Performed by: FAMILY MEDICINE

## 2019-04-04 PROCEDURE — 99499 RISK ADDL DX/OHS AUDIT: ICD-10-PCS | Mod: S$GLB,,, | Performed by: FAMILY MEDICINE

## 2019-04-04 PROCEDURE — 99999 PR PBB SHADOW E&M-EST. PATIENT-LVL IV: CPT | Mod: PBBFAC,HCNC,, | Performed by: FAMILY MEDICINE

## 2019-04-04 PROCEDURE — 99999 PR PBB SHADOW E&M-EST. PATIENT-LVL IV: ICD-10-PCS | Mod: PBBFAC,HCNC,, | Performed by: FAMILY MEDICINE

## 2019-04-04 PROCEDURE — 3079F PR MOST RECENT DIASTOLIC BLOOD PRESSURE 80-89 MM HG: ICD-10-PCS | Mod: HCNC,CPTII,S$GLB, | Performed by: FAMILY MEDICINE

## 2019-04-04 PROCEDURE — 3079F DIAST BP 80-89 MM HG: CPT | Mod: HCNC,CPTII,S$GLB, | Performed by: FAMILY MEDICINE

## 2019-04-04 PROCEDURE — 1101F PR PT FALLS ASSESS DOC 0-1 FALLS W/OUT INJ PAST YR: ICD-10-PCS | Mod: HCNC,CPTII,S$GLB, | Performed by: FAMILY MEDICINE

## 2019-04-04 PROCEDURE — 99214 PR OFFICE/OUTPT VISIT, EST, LEVL IV, 30-39 MIN: ICD-10-PCS | Mod: HCNC,S$GLB,, | Performed by: FAMILY MEDICINE

## 2019-04-04 PROCEDURE — 99214 OFFICE O/P EST MOD 30 MIN: CPT | Mod: HCNC,S$GLB,, | Performed by: FAMILY MEDICINE

## 2019-04-04 NOTE — PROGRESS NOTES
Subjective:       Patient ID: Tracy Najera is a 68 y.o. female.    Chief Complaint: Follow-up (htn)    HPI     69 yo F      Anemia  HTN  CKD III  CVA - R sided weakness  Arthritis  HLD  Tortuous Aorta  osteopenia    HTN f/u visit    Losartan 100  At last visit we added amlodipine 5 mg    At home - running 163/90 in morning -  Found as ran low - 90s / 80s.    Digital HTN -  She had no credit card - told they don't take cash.    Review of Systems   Constitutional: Negative for activity change and unexpected weight change.   HENT: Negative for hearing loss and rhinorrhea.    Eyes: Negative for discharge and visual disturbance.   Respiratory: Negative for chest tightness and wheezing.    Cardiovascular: Negative for chest pain and palpitations.   Gastrointestinal: Negative for blood in stool, constipation, diarrhea and vomiting.   Endocrine: Negative for polydipsia and polyuria.   Genitourinary: Negative for difficulty urinating, dysuria, hematuria and menstrual problem.   Musculoskeletal: Negative for arthralgias, joint swelling and neck pain.   Neurological: Negative for weakness and headaches.   Psychiatric/Behavioral: Negative for confusion and dysphoric mood.       Objective:       Vitals:    04/04/19 1117   BP: 138/88   Pulse:    Resp:    Temp:        Physical Exam   Constitutional: She is oriented to person, place, and time. She appears well-developed and well-nourished. She does not have a sickly appearance. No distress.   HENT:   Head: Normocephalic and atraumatic.   Right Ear: External ear normal.   Left Ear: External ear normal.   Eyes: Conjunctivae, EOM and lids are normal.   Neck: Trachea normal, normal range of motion and full passive range of motion without pain.   Cardiovascular: Normal rate, regular rhythm and normal heart sounds.   Pulmonary/Chest: Effort normal and breath sounds normal. No stridor. No respiratory distress.   Abdominal: Soft. Normal appearance and bowel sounds are normal. She  exhibits no distension. There is no tenderness. There is no guarding. No hernia.   Neurological: She is alert and oriented to person, place, and time. She is not disoriented.   Skin: Skin is warm, dry and intact. No rash noted. She is not diaphoretic.   Psychiatric: She has a normal mood and affect. Her speech is normal and behavior is normal. Thought content normal.       Assessment:       1. Hypertension, unspecified type    2. Hyperlipidemia, unspecified hyperlipidemia type    3. History of CVA (cerebrovascular accident)    4. Anemia, unspecified type        Plan:   Hypertension  She will discuss payment w digital HTN O bar  Controlled better today  Widely variable  No changes at present    HLD  On statin  Will check in 3 weeks    Anemia  Will check in 3 weeks    H/O stroke  plavix  Statin  Needs optimal BP control    Fu in 3 weeks    No follow-ups on file.

## 2019-04-08 ENCOUNTER — PATIENT OUTREACH (OUTPATIENT)
Dept: OTHER | Facility: OTHER | Age: 69
End: 2019-04-08

## 2019-04-08 NOTE — PROGRESS NOTES
Last 5 Patient Entered Readings                                      Current 30 Day Average: 124/84     Recent Readings 4/8/2019 4/7/2019 4/7/2019 4/7/2019 4/6/2019    SBP (mmHg) 123 112 114 117 102    DBP (mmHg) 76 76 88 90 69    Pulse 78 73 84 81 80        Digital Medicine Enrollment Call    Introduced Mrs. Tracy Najera to Digital Medicine.     Discussed program expectations and requirements.    Introduced digital medicine care team.     Reviewed the importance of self-monitoring for digital medicine participation.     Reviewed that the Digital Medicine team is not available for emergencies and instructed the patient to call 911 or [facility] ([phone] or [phone]) if one arises.

## 2019-04-08 NOTE — LETTER
April 8, 2019     Tracy Najera  [address] [address]       Dear Tracy,    Welcome to [facility]! Our goal is to make care effective, proactive and convenient by using data you send us from home to better treat your chronic conditions.        My name is Frances Carter, and I am your dedicated Digital Medicine clinician. As an expert in medication management, I will help ensure that the medications you are taking continue to provide the intended benefits and help you reach your goals. You can reach me directly at [phone] or by sending me a message directly through your [facility] account.      I am Sabina Bradford and I will be your health . My job is to help you identify lifestyle changes to improve your disease control. We will talk about nutrition, exercise, and other ways you may be able to adjust your current habits to better your health. Additionally, we will help ensure you are completing the tests and screenings that are necessary to help manage your conditions. You can reach me directly at  or by sending me a message directly through your [facility] account.    Most importantly, YOU are at the center of this team. Together, we will work to improve your overall health and encourage you to meet your goals for a healthier lifestyle.     What we expect from YOU:  · Please take frequent home blood pressure measurements. We ask that you take at least 1 blood pressure reading per week, but more information will better help us get you know you. Be sure you rest for a few minutes before taking the reading in a quiet, comfortable place.     Be available to receive phone calls or [facility] messages, when appropriate, from your care team. Please let us know if there are any specific days or times that work best for us to reach you via phone.     Complete routine tests and screenings. Dont worry, we will help keep you on track!           What you should expect from  your Digital Medicine Care Team:   We will work with you to create a personalized plan of care and provide you with encouragement and education, including regarding lifestyle changes, that could help you manage your disease states.     We will adjust your current medications, if needed, and continue to monitor your long-term progress.     We will provide you and your physician with monthly progress reports after you have been in the program for more than 30 days.     We will send you reminders through [facility] and text messages to help ensure you do not miss any testing deadlines to help manage your disease states.    You will be able to reach us by phone or through your [facility] account by clicking our names under Care Team on the right side of the home screen.    I look forward to working with you to achieve your blood pressure goals!    We look forward to working with you to help manage your health,    Sincerely,    Your Digital Medicine Team    Please visit our websites to learn more:   · Hypertension: [email or web address]      Remember, we are not available for emergencies. If you have an emergency, please contact your doctors office directly or call [facility] on-call ([phone] or [phone]) or [phone].

## 2019-04-08 NOTE — PROGRESS NOTES
Last 5 Patient Entered Readings                                      Current 30 Day Average: 124/84     Recent Readings 4/8/2019 4/7/2019 4/7/2019 4/7/2019 4/6/2019    SBP (mmHg) 123 112 114 117 102    DBP (mmHg) 76 76 88 90 69    Pulse 78 73 84 81 80        Digital Medicine: Health  Introduction    Introduced Mrs. Tracy Najera to Digital Medicine. Discussed health  role and recommended lifestyle modifications.    Mrs. Najera stated that her weight was 180 Lbs.  She said she had gained weight, but lost it and is back down to 180 Lbs.    Lifestyle Assessment:    Current Dietary Habits(i.e. low sodium, food labels, dining out):Mrs. Najera states that she tries not to use salt at home.    Exercise:Mrs. Najera stated that she currently takes a couple of walks a day, but said she doesn't keep track of how long she walks for.      Alcohol/Tobacco:deferred    Medication Adherence: has been compliant with the medicaiton regimen     Other goals:none reported at this time.    Reviewed AHA/AACE recommendations:  Limit sodium intake to <2000mg/day  Recommended CHO intake, 45-65% of daily caloric intake  Perform 150 minutes of physical activity per week    Reviewed the importance of self-monitoring, medication adherence, and that the health  can be used as a resource for lifestyle modifications to help reduce or maintain a healthy lifestyle.  Reviewed that the Digital Medicine team is not available for emergencies and instructed the patient to call 911 or [facility] ([phone] or [phone]) if one arises.

## 2019-04-08 NOTE — Clinical Note
April 8, 2019              Tracy Najera  [address] [address]         Dear Mike Oakes MD,           Your patient Tracy Najera recently enrolled in [facility] program. The purpose of this letter is to provide you with information about the program, so you can be fully informed.    Background  Hypertension remains the most common chronic disease and is one of the leading risk factors leading to death and morbidity in the [place].1,2 Of the 45% of [place] adults with hypertension, only 50% are under control, exposing these individuals to higher risks of stroke, myocardial infarction and renal failure. [place] and the [facility] are particularly vulnerable, with hypertension prevalence rates exceeding 50% of the adult population.    Current guidelines recommend multi-disciplinary team-based care that incorporates active lifestyle coaching and pharmacotherapy; this method has proven to be more effective in achieving and maintaining blood pressure control than traditional care.2 As such, [facility] has developed a program that incorporates these recommendations into a comprehensive care program for patients with hypertension.    Hypertension Digital Medicine   Hypertension Digital Medicine was launched in 2015, and today is actively managing thousands of patients. The program uses FDA-approved connected devices that automatically transmit home readings directly to [facility]s Digital Medicine Care Team, composed of clinicians (Doctors of Pharmacy - PharmDs or Advanced Practice Providers - APPs) and Health Coaches. The Care Team actively manages all pharmacotherapy as well as provides ongoing lifestyle (dietary and physical activity) support and education based on current national guidelines.3 Patients enrolled in the program achieve higher rates (typically 2-3 fold) of blood pressure control compared to usual care, with additional improvements seen in  patient activation, satisfaction, and quality of life.4     How to Stay Informed  We want to keep you informed about Tracys progress in the program. Digital Medicine program information can be viewed by accessing a patients chart in [employer] [facility].      You can click here to view a brochure that further describes [facility]. If you have any questions, please dont hesitate to contact us at [phone] for more information.       Thank you,   [facility]    ---------------------------------------  1. Harshal TA, Doris A, Fidel S, Rebeca MC, International Society of H. The global cost of nonoptimal blood pressure. J Hypertens. 2009;27(7):3772-9220.    2. Khadijahlton PK, Tami RM, Aronow WS, et al. 2017 ACC/AHA/AAPA/ABC/ACPM/AGS/APhA/ELY/ASPC/NMA/PCNA Guideline for the Prevention, Detection, Evaluation, and Management of High Blood Pressure in Adults: A Report of the American College of Cardiology/American Heart Association Task Force on Clinical Practice Guidelines. Circulation. 2018;138(17):f882-s302.    3. Queta LYNCH, Yonathan HOLLIDAY, Jimena HOLLOWAY, Ela CASTRO, Chuck STERLING. New Concepts in Hypertension Management: A Population-Based Perspective. Progress in cardiovascular diseases. 2016;59(3):289-294.    4. Yonathan Crespo Bober RM, Chuck Holder. Improving Hypertension Control and Patient Engagement Using Digital Tools. The American journal of medicine. 2017;130(1):14-20.

## 2019-04-09 NOTE — TELEPHONE ENCOUNTER
Spoke with patient. Advised patient to start holding her Plavix on Thursday, April 11, 2019. Patient verbalized understanding.    Patient requested new instructions. Instructions sent via Portal.

## 2019-04-12 RX ORDER — AMLODIPINE BESYLATE 5 MG/1
TABLET ORAL
Qty: 30 TABLET | Refills: 0 | Status: SHIPPED | OUTPATIENT
Start: 2019-04-12 | End: 2019-04-25 | Stop reason: SDUPTHER

## 2019-04-16 ENCOUNTER — HOSPITAL ENCOUNTER (OUTPATIENT)
Facility: HOSPITAL | Age: 69
Discharge: HOME | End: 2019-04-16
Attending: FAMILY MEDICINE | Admitting: FAMILY MEDICINE
Payer: MEDICARE

## 2019-04-16 ENCOUNTER — ANESTHESIA (OUTPATIENT)
Dept: ENDOSCOPY | Facility: HOSPITAL | Age: 69
End: 2019-04-16
Payer: MEDICARE

## 2019-04-16 ENCOUNTER — ANESTHESIA EVENT (OUTPATIENT)
Dept: ENDOSCOPY | Facility: HOSPITAL | Age: 69
End: 2019-04-16
Payer: MEDICARE

## 2019-04-16 DIAGNOSIS — Z86.010 HISTORY OF COLON POLYPS: ICD-10-CM

## 2019-04-16 DIAGNOSIS — Z12.11 COLON CANCER SCREENING: Primary | ICD-10-CM

## 2019-04-16 DIAGNOSIS — Z12.11 SPECIAL SCREENING FOR MALIGNANT NEOPLASMS, COLON: ICD-10-CM

## 2019-04-16 PROBLEM — Z86.0100 HISTORY OF COLON POLYPS: Status: ACTIVE | Noted: 2019-04-16

## 2019-04-16 PROCEDURE — G0105 COLORECTAL SCRN; HI RISK IND: HCPCS | Performed by: FAMILY MEDICINE

## 2019-04-16 PROCEDURE — 25000003 PHARM REV CODE 250: Mod: HCNC | Performed by: FAMILY MEDICINE

## 2019-04-16 PROCEDURE — 25000003 PHARM REV CODE 250: Mod: HCNC | Performed by: NURSE ANESTHETIST, CERTIFIED REGISTERED

## 2019-04-16 PROCEDURE — 63600175 PHARM REV CODE 636 W HCPCS: Mod: HCNC | Performed by: NURSE ANESTHETIST, CERTIFIED REGISTERED

## 2019-04-16 PROCEDURE — 37000008 HC ANESTHESIA 1ST 15 MINUTES: Performed by: FAMILY MEDICINE

## 2019-04-16 PROCEDURE — G0105 COLORECTAL SCRN; HI RISK IND: HCPCS | Mod: ,,, | Performed by: FAMILY MEDICINE

## 2019-04-16 PROCEDURE — 37000009 HC ANESTHESIA EA ADD 15 MINS: Performed by: FAMILY MEDICINE

## 2019-04-16 PROCEDURE — G0105 COLORECTAL SCRN; HI RISK IND: ICD-10-PCS | Mod: ,,, | Performed by: FAMILY MEDICINE

## 2019-04-16 RX ORDER — PROPOFOL 10 MG/ML
VIAL (ML) INTRAVENOUS
Status: DISCONTINUED | OUTPATIENT
Start: 2019-04-16 | End: 2019-04-16

## 2019-04-16 RX ORDER — LIDOCAINE HYDROCHLORIDE 10 MG/ML
INJECTION INFILTRATION; PERINEURAL
Status: DISCONTINUED | OUTPATIENT
Start: 2019-04-16 | End: 2019-04-16

## 2019-04-16 RX ORDER — SODIUM CHLORIDE, SODIUM LACTATE, POTASSIUM CHLORIDE, CALCIUM CHLORIDE 600; 310; 30; 20 MG/100ML; MG/100ML; MG/100ML; MG/100ML
INJECTION, SOLUTION INTRAVENOUS CONTINUOUS
Status: DISCONTINUED | OUTPATIENT
Start: 2019-04-16 | End: 2019-04-16 | Stop reason: HOSPADM

## 2019-04-16 RX ORDER — SODIUM CHLORIDE 0.9 % (FLUSH) 0.9 %
10 SYRINGE (ML) INJECTION
Status: CANCELLED | OUTPATIENT
Start: 2019-04-16

## 2019-04-16 RX ADMIN — PROPOFOL 50 MG: 10 INJECTION, EMULSION INTRAVENOUS at 01:04

## 2019-04-16 RX ADMIN — SODIUM CHLORIDE, SODIUM LACTATE, POTASSIUM CHLORIDE, AND CALCIUM CHLORIDE: .6; .31; .03; .02 INJECTION, SOLUTION INTRAVENOUS at 11:04

## 2019-04-16 RX ADMIN — LIDOCAINE HYDROCHLORIDE 50 MG: 10 INJECTION, SOLUTION INFILTRATION; PERINEURAL at 01:04

## 2019-04-16 NOTE — DISCHARGE SUMMARY
Endoscopy Discharge Summary      Admit Date: 4/16/2019    Discharge Date and Time:  4/16/2019 1:26 PM    Attending Physician: Ulisses Jacobsen MD     Discharge Physician: Ulisses Jacobsen MD     Principal Admitting Diagnoses: Colon cancer screening         Discharge Diagnosis: The primary encounter diagnosis was Colon cancer screening. Diagnoses of Special screening for malignant neoplasms, colon and History of colon polyps were also pertinent to this visit.     Discharged Condition: Good    Indication for Admission: Colon cancer screening     Hospital Course: Patient was admitted for an inpatient procedure and tolerated the procedure well with no complications.    Significant Diagnostic Studies: Colonoscopy    Pathology (if any):  Specimen (12h ago, onward)    None          Estimated Blood Loss: 0 ml.    Discussed with: patient and family.    Disposition: Home.    Follow Up/Patient Instructions:   Current Discharge Medication List      CONTINUE these medications which have NOT CHANGED    Details   amLODIPine (NORVASC) 5 MG tablet TAKE 1 TABLET BY MOUTH EVERY DAY  Qty: 30 tablet, Refills: 0      atorvastatin (LIPITOR) 40 MG tablet Take 1 tablet (40 mg total) by mouth once daily.  Qty: 90 tablet, Refills: 3      fluticasone (FLONASE) 50 mcg/actuation nasal spray 2 sprays (100 mcg total) by Each Nare route once daily.  Qty: 1 Bottle, Refills: 3      losartan (COZAAR) 100 MG tablet Take 1 tablet (100 mg total) by mouth once daily.  Qty: 90 tablet, Refills: 3      clopidogrel (PLAVIX) 75 mg tablet Take 1 tablet (75 mg total) by mouth once daily.  Qty: 90 tablet, Refills: 3    Associated Diagnoses: History of CVA with residual deficit         STOP taking these medications       sodium,potassium,mag sulfates (SUPREP BOWEL PREP KIT) 17.5-3.13-1.6 gram SolR Comments:   Reason for Stopping:               Discharge Procedure Orders   Diet general     Call MD for:  temperature >100.4     Call MD for:  persistent nausea and  vomiting     Call MD for:  severe uncontrolled pain     Call MD for:  difficulty breathing, headache or visual disturbances     Call MD for:  redness, tenderness, or signs of infection (pain, swelling, redness, odor or green/yellow discharge around incision site)     Call MD for:  hives     Call MD for:  persistent dizziness or light-headedness     No dressing needed       Follow-up Information     Go to Mike Oakes MD.    Specialty:  Family Medicine  Why:  As needed  Contact information:  [address] [facility] [place] [address] [phone]

## 2019-04-16 NOTE — [employer] PATIENT INSTRUCTIONS
Discharge Summary/Instructions after an Endoscopic Procedure  Patient Name: Tracy Najera  Patient MRN: [id number]  Patient YOB: 1950 Tuesday, April 16, 2019 Ulisses Jacobsen MD  RESTRICTIONS:  During your procedure today, you received medications for sedation.  These   medications may affect your judgment, balance and coordination.  Therefore,   for 24 hours, you have the following restrictions:   - DO NOT drive a car, operate machinery, make legal/financial decisions,   sign important papers or drink alcohol.    ACTIVITY:  Today: no heavy lifting, straining or running due to procedural   sedation/anesthesia.  The following day: return to full activity including work.  DIET:  Eat and drink normally unless instructed otherwise.     TREATMENT FOR COMMON SIDE EFFECTS:  - Mild abdominal pain, nausea, belching, bloating or excessive gas:  rest,   eat lightly and use a heating pad.  - Sore Throat: treat with throat lozenges and/or gargle with warm salt   water.  - Because air was used during the procedure, expelling large amounts of air   from your rectum or belching is normal.  - If a bowel prep was taken, you may not have a bowel movement for 1-3 days.    This is normal.  SYMPTOMS TO WATCH FOR AND REPORT TO YOUR PHYSICIAN:  1. Abdominal pain or bloating, other than gas cramps.  2. Chest pain.  3. Back pain.  4. Signs of infection such as: chills or fever occurring within 24 hours   after the procedure.  5. Rectal bleeding, which would show as bright red, maroon, or black stools.   (A tablespoon of blood from the rectum is not serious, especially if   hemorrhoids are present.)  6. Vomiting.  7. Weakness or dizziness.  GO DIRECTLY TO THE NEAREST EMERGENCY ROOM IF YOU HAVE ANY OF THE FOLLOWING:      Difficulty breathing              Chills and/or fever over 101 F   Persistent vomiting and/or vomiting blood   Severe abdominal pain   Severe chest pain   Black, tarry stools   Bleeding- more than one  tablespoon   Any other symptom or condition that you feel may need urgent attention  Your doctor recommends these additional instructions:  If any biopsies were taken, your doctors clinic will contact you in 1 to 2   weeks with any results.  - Patient has a contact number available for emergencies.  The signs and   symptoms of potential delayed complications were discussed with the   patient.  Return to normal activities tomorrow.  Written discharge   instructions were provided to the patient.   - Resume previous diet.   - Continue present medications.   - Repeat colonoscopy in 5 years for screening purposes.   - Discharge patient to home (via wheelchair).  For questions, problems or results please call your physician Ulisses Jacobsen MD at Work:  [phone]  If you have any questions about the above instructions, call the GI   department at [phone] or call the endoscopy unit at [phone]   from 7am until 3 pm.  [facility], EMERGENCY ROOM PHONE NUMBER:   [phone]  IF A COMPLICATION OR EMERGENCY SITUATION ARISES AND YOU ARE UNABLE TO REACH   YOUR PHYSICIAN - GO DIRECTLY TO THE EMERGENCY ROOM.  I have read or have had read to me these discharge instructions for my   procedure and have received a written copy.  I understand these   instructions and will follow-up with my physician if I have any questions.     __________________________________       _____________________________________  Nurse Signature                                          Patient/Designated   Responsible Party Signature  Ulisses Jacobsen MD  4/16/2019 1:22:46 PM  This report has been verified and signed electronically.  [employer]

## 2019-04-16 NOTE — DISCHARGE INSTRUCTIONS
Colonoscopy     A camera attached to a flexible tube with a viewing lens is used to take video pictures.     Colonoscopy is a test to view the inside of your lower digestive tract (colon and rectum). Sometimes it can show the last part of the small intestine (ileum). During the test, small pieces of tissue may be removed for testing. This is called a biopsy. Small growths, such as polyps, may also be removed.   Why is colonoscopy done?  The test is done to help look for colon cancer. And it can help find the source of abdominal pain, bleeding, and changes in bowel habits. It may be needed once a year, depending on factors such as your:  · Age  · Health history  · Family health history  · Symptoms  · Results from any prior colonoscopy  Risks and possible complications  These include:  · Bleeding               · A puncture or tear in the colon   · Risks of anesthesia  · A cancer lesion not being seen  Getting ready   To prepare for the test:  · Talk with your healthcare provider about the risks of the test (see below). Also ask your healthcare provider about alternatives to the test.  · Tell your healthcare provider about any medicines you take. Also tell him or her about any health conditions you may have.  · Make sure your rectum and colon are empty for the test. Follow the diet and bowel prep instructions exactly. If you dont, the test may need to be rescheduled.  · Plan for a friend or family member to drive you home after the test.     Colonoscopy provides an inside view of the entire colon.     You may discuss the results with your doctor right away or at a future visit.  During the test   The test is usually done in the hospital on an outpatient basis. This means you go home the same day. The procedure takes about 30 minutes. During that time:  · You are given relaxing (sedating) medicine through an IV line. You may be drowsy, or fully asleep.  · The healthcare provider will first give you a physical exam to  check for anal and rectal problems.  · Then the anus is lubricated and the scope inserted.  · If you are awake, you may have a feeling similar to needing to have a bowel movement. You may also feel pressure as air is pumped into the colon. Its OK to pass gas during the procedure.  · Biopsy, polyp removal, or other treatments may be done during the test.  After the test   You may have gas right after the test. It can help to try to pass it to help prevent later bloating. Your healthcare provider may discuss the results with you right away. Or you may need to schedule a follow-up visit to talk about the results. After the test, you can go back to your normal eating and other activities. You may be tired from the sedation and need to rest for a few hours.  Date Last Reviewed: 11/1/2016 © 2000-2017 [employer]. [address] [place] [address], [place] [address]. All rights reserved. This information is not intended as a substitute for professional medical care. Always follow your healthcare professional's instructions.

## 2019-04-16 NOTE — H&P
Short Stay Endoscopy History and Physical    PCP - Mike Oakes MD    Procedure - Colonoscopy  ASA - 2  Mallampati - per anesthesia  History of Anesthesia problems - no  Family history Anesthesia problems -  no     HPI:  This is a 68 y.o. female here for evaluation of :   Active Hospital Problems    Diagnosis  POA    *Colon cancer screening [Z12.11]  Not Applicable    History of colon polyps [Z86.010]  Not Applicable    Special screening for malignant neoplasms, colon [Z12.11]  Not Applicable      Resolved Hospital Problems   No resolved problems to display.         Health Maintenance       Date Due Completion Date    Colonoscopy 09/30/2018 9/30/2013    Lipid Panel 11/13/2019 11/13/2018    Mammogram 02/27/2020 2/27/2019    Override on 8/3/2015: Done (Done at [facility])    Override on 10/6/2014: Done (Done at [facility]--exact date unknown)    Override on 9/11/2013: (N/S)    Override on 8/19/2012: (N/S)    High Dose Statin 04/16/2020 4/16/2019    DEXA SCAN 11/14/2021 11/14/2018    Override on 11/2/2015: Done (Exact date unknown--done at [facility])    TETANUS VACCINE 10/29/2025 10/29/2015          Screening - yes  History of polyps - yes  Diarrhea - no  Anemia - no  Blood in stools - no  Abdominal pain - no  Other - no    ROS:  CONSTITUTIONAL: Denies weight change,  fatigue, fevers, chills, night sweats.  CARDIOVASCULAR: Denies chest pain, shortness of breath, orthopnea and edema.  RESPIRATORY: Denies cough, hemoptysis, dyspnea, and wheezing.  GI: See HPI.    Medical History:   Past Medical History:   Diagnosis Date    Anticoagulant long-term use     Plavix    Arthritis     CVA (cerebral vascular accident) 2008    R hemiparesis, R hand contracture    HTN (hypertension)     Hyperlipidemia        Surgical History:   Past Surgical History:   Procedure Laterality Date    BREAST BIOPSY Left 2017    COLONOSCOPY N/A 9/30/2013    [facility]    EXCISION-DUCT Left 6/1/2017    [facility]  [facility] at [facility]    HYSTERECTOMY      partial     TONSILLECTOMY         Family History:   Family History   Problem Relation Age of Onset    Hypertension Mother     Hypertension Sister     Stroke Brother     Cancer Neg Hx     Diabetes Neg Hx     Heart disease Neg Hx     Kidney disease Neg Hx        Social History:   Social History     Tobacco Use    Smoking status: Never Smoker    Smokeless tobacco: Never Used   Substance Use Topics    Alcohol use: Yes     Alcohol/week: 0.0 oz     Comment: occ    Drug use: No       Allergies:   Review of patient's allergies indicates:  No Known Allergies    Medications:   No current facility-administered medications on file prior to encounter.      Current Outpatient Medications on File Prior to Encounter   Medication Sig Dispense Refill    atorvastatin (LIPITOR) 40 MG tablet Take 1 tablet (40 mg total) by mouth once daily. 90 tablet 3    fluticasone (FLONASE) 50 mcg/actuation nasal spray 2 sprays (100 mcg total) by Each Nare route once daily. 1 Bottle 3    losartan (COZAAR) 100 MG tablet Take 1 tablet (100 mg total) by mouth once daily. 90 tablet 3    clopidogrel (PLAVIX) 75 mg tablet Take 1 tablet (75 mg total) by mouth once daily. 90 tablet 3       Physical Exam:  Vital Signs:   Vitals:    04/16/19 1137   BP: (!) 153/70   Pulse: 85   Resp: 17   Temp: 97.7 °F (36.5 °C)     General Appearance: Well appearing in no acute distress  ENT: OP clear  Chest: CTA B  CV: RRR, no m/r/g  Abd: s/nt/nd/nabs  Ext: no edema    Labs:Reviewed    IMP:  Active Hospital Problems    Diagnosis  POA    *Colon cancer screening [Z12.11]  Not Applicable    History of colon polyps [Z86.010]  Not Applicable    Special screening for malignant neoplasms, colon [Z12.11]  Not Applicable      Resolved Hospital Problems   No resolved problems to display.         Plan:   I have explained the risks and benefits of colonoscopy to the patient including but not limited to bleeding,  perforation, infection, and death. The patient wishes to proceed.

## 2019-04-16 NOTE — TRANSFER OF CARE
"Anesthesia Transfer of Care Note    Patient: Tracy Najera    Procedure(s) Performed: Procedure(s) (LRB):  COLONOSCOPY (N/A)    Patient location: GI    Anesthesia Type: MAC    Transport from OR: Transported from OR on room air with adequate spontaneous ventilation    Post pain: adequate analgesia    Post assessment: no apparent anesthetic complications    Post vital signs: stable    Level of consciousness: awake, alert and oriented    Nausea/Vomiting: no nausea/vomiting    Complications: none    Transfer of care protocol was followed      Last vitals:   Visit Vitals  BP (!) 153/70   Pulse 85   Temp 36.5 °C (97.7 °F)   Resp 17   Ht 5' 4" (1.626 m)   Wt 86.3 kg (190 lb 4.1 oz)   SpO2 99%   Breastfeeding? No   BMI 32.66 kg/m²     "

## 2019-04-16 NOTE — ANESTHESIA PREPROCEDURE EVALUATION
04/16/2019  Tracy Najera is a 68 y.o., female.    Anesthesia Evaluation    I have reviewed the Patient Summary Reports.     I have reviewed the Medications.     Review of Systems  Anesthesia Hx:  No problems with previous Anesthesia    Cardiovascular:   Hypertension hyperlipidemia ECG has been reviewed. Normal sinus rhythm  Possible Left atrial enlargement  LVH  Abnormal ECG  When compared with ECG of 26-MAY-2018 10:53,  No significant change was found  Confirmed by PING FRENCH, LAINE ([id number]) on 3/18/2019 11:14:02 PM   Neurological:   CVA (right sided weakness), residual symptoms        Physical Exam  General:  Morbid Obesity    Airway/Jaw/Neck:  Airway Findings: Tongue: Normal General Airway Assessment: Adult  Mallampati: II      Dental:  Dental Findings: In tact   Chest/Lungs:  Chest/Lungs Findings: Normal Respiratory Rate     Heart/Vascular:  Heart Findings: Rate: Normal  Rhythm: Regular Rhythm             Anesthesia Plan  Type of Anesthesia, risks & benefits discussed:  Anesthesia Type:  MAC  Patient's Preference:   Intra-op Monitoring Plan: standard ASA monitors  Intra-op Monitoring Plan Comments:   Post Op Pain Control Plan: multimodal analgesia  Post Op Pain Control Plan Comments:   Induction:   IV  Beta Blocker:  Patient is not currently on a Beta-Blocker (No further documentation required).       Informed Consent: Patient understands risks and agrees with Anesthesia plan.  Questions answered. Anesthesia consent signed with patient.  ASA Score: 2     Day of Surgery Review of History & Physical:  There are no significant changes.          Ready For Surgery From Anesthesia Perspective.

## 2019-04-16 NOTE — ANESTHESIA RELEASE NOTE
"Anesthesia Release from PACU Note    Patient: Tracy Najera    Procedure(s) Performed: Procedure(s) (LRB):  COLONOSCOPY (N/A)    Anesthesia type: MAC    Post pain: Adequate analgesia    Post assessment: no apparent anesthetic complications, tolerated procedure well and no evidence of recall    Last Vitals:   Visit Vitals  BP (!) 130/53   Pulse 69   Temp 36.5 °C (97.7 °F)   Resp 18   Ht 5' 4" (1.626 m)   Wt 86.3 kg (190 lb 4.1 oz)   SpO2 98%   Breastfeeding? No   BMI 32.66 kg/m²       Post vital signs: stable    Level of consciousness: awake, alert  and oriented    Nausea/Vomiting: no nausea/no vomiting    Complications: none    Airway Patency: patent    Respiratory: unassisted, spontaneous ventilation, room air    Cardiovascular: stable and blood pressure at baseline    Hydration: euvolemic  "

## 2019-04-17 VITALS
BODY MASS INDEX: 32.48 KG/M2 | DIASTOLIC BLOOD PRESSURE: 53 MMHG | RESPIRATION RATE: 18 BRPM | OXYGEN SATURATION: 98 % | SYSTOLIC BLOOD PRESSURE: 130 MMHG | HEIGHT: 64 IN | WEIGHT: 190.25 LBS | TEMPERATURE: 98 F | HEART RATE: 69 BPM

## 2019-04-25 ENCOUNTER — OFFICE VISIT (OUTPATIENT)
Dept: INTERNAL MEDICINE | Facility: CLINIC | Age: 69
End: 2019-04-25
Payer: MEDICARE

## 2019-04-25 ENCOUNTER — LAB VISIT (OUTPATIENT)
Dept: LAB | Facility: HOSPITAL | Age: 69
End: 2019-04-25
Attending: FAMILY MEDICINE
Payer: MEDICARE

## 2019-04-25 VITALS
HEIGHT: 64 IN | HEART RATE: 82 BPM | OXYGEN SATURATION: 99 % | WEIGHT: 191.13 LBS | TEMPERATURE: 97 F | BODY MASS INDEX: 32.63 KG/M2 | DIASTOLIC BLOOD PRESSURE: 82 MMHG | RESPIRATION RATE: 20 BRPM | SYSTOLIC BLOOD PRESSURE: 136 MMHG

## 2019-04-25 DIAGNOSIS — I69.90 SEQUELAE OF CEREBROVASCULAR DISEASE: ICD-10-CM

## 2019-04-25 DIAGNOSIS — Z79.899 HISTORY OF LONG-TERM TREATMENT WITH HIGH-RISK MEDICATION: ICD-10-CM

## 2019-04-25 DIAGNOSIS — I10 HYPERTENSION, UNSPECIFIED TYPE: ICD-10-CM

## 2019-04-25 DIAGNOSIS — Z86.73 HISTORY OF CVA (CEREBROVASCULAR ACCIDENT): ICD-10-CM

## 2019-04-25 DIAGNOSIS — I10 HYPERTENSION, UNSPECIFIED TYPE: Primary | ICD-10-CM

## 2019-04-25 DIAGNOSIS — D64.9 ANEMIA, UNSPECIFIED TYPE: ICD-10-CM

## 2019-04-25 DIAGNOSIS — K59.00 CONSTIPATION, UNSPECIFIED CONSTIPATION TYPE: ICD-10-CM

## 2019-04-25 DIAGNOSIS — J30.9 ALLERGIC RHINITIS, UNSPECIFIED SEASONALITY, UNSPECIFIED TRIGGER: ICD-10-CM

## 2019-04-25 LAB
25(OH)D3+25(OH)D2 SERPL-MCNC: 17 NG/ML (ref 30–96)
ALBUMIN SERPL BCP-MCNC: 3.7 G/DL (ref 3.5–5.2)
ALP SERPL-CCNC: 87 U/L (ref 55–135)
ALT SERPL W/O P-5'-P-CCNC: 12 U/L (ref 10–44)
ANION GAP SERPL CALC-SCNC: 7 MMOL/L (ref 8–16)
AST SERPL-CCNC: 15 U/L (ref 10–40)
BASOPHILS # BLD AUTO: 0.02 K/UL (ref 0–0.2)
BASOPHILS NFR BLD: 0.3 % (ref 0–1.9)
BILIRUB SERPL-MCNC: 0.4 MG/DL (ref 0.1–1)
BUN SERPL-MCNC: 9 MG/DL (ref 8–23)
CALCIUM SERPL-MCNC: 9.8 MG/DL (ref 8.7–10.5)
CHLORIDE SERPL-SCNC: 106 MMOL/L (ref 95–110)
CHOLEST SERPL-MCNC: 170 MG/DL (ref 120–199)
CHOLEST/HDLC SERPL: 3.1 {RATIO} (ref 2–5)
CO2 SERPL-SCNC: 26 MMOL/L (ref 23–29)
CREAT SERPL-MCNC: 1 MG/DL (ref 0.5–1.4)
DIFFERENTIAL METHOD: ABNORMAL
EOSINOPHIL # BLD AUTO: 0.1 K/UL (ref 0–0.5)
EOSINOPHIL NFR BLD: 1.3 % (ref 0–8)
ERYTHROCYTE [DISTWIDTH] IN BLOOD BY AUTOMATED COUNT: 13.5 % (ref 11.5–14.5)
EST. GFR  (AFRICAN AMERICAN): >60 ML/MIN/1.73 M^2
EST. GFR  (NON AFRICAN AMERICAN): 58 ML/MIN/1.73 M^2
GLUCOSE SERPL-MCNC: 92 MG/DL (ref 70–110)
HCT VFR BLD AUTO: 38.6 % (ref 37–48.5)
HDLC SERPL-MCNC: 54 MG/DL (ref 40–75)
HDLC SERPL: 31.8 % (ref 20–50)
HGB BLD-MCNC: 12.1 G/DL (ref 12–16)
IMM GRANULOCYTES # BLD AUTO: 0.01 K/UL (ref 0–0.04)
IMM GRANULOCYTES NFR BLD AUTO: 0.2 % (ref 0–0.5)
LDLC SERPL CALC-MCNC: 100.6 MG/DL (ref 63–159)
LYMPHOCYTES # BLD AUTO: 1.6 K/UL (ref 1–4.8)
LYMPHOCYTES NFR BLD: 26.7 % (ref 18–48)
MCH RBC QN AUTO: 28.1 PG (ref 27–31)
MCHC RBC AUTO-ENTMCNC: 31.3 G/DL (ref 32–36)
MCV RBC AUTO: 90 FL (ref 82–98)
MONOCYTES # BLD AUTO: 0.5 K/UL (ref 0.3–1)
MONOCYTES NFR BLD: 7.7 % (ref 4–15)
NEUTROPHILS # BLD AUTO: 3.9 K/UL (ref 1.8–7.7)
NEUTROPHILS NFR BLD: 63.8 % (ref 38–73)
NONHDLC SERPL-MCNC: 116 MG/DL
NRBC BLD-RTO: 0 /100 WBC
PLATELET # BLD AUTO: 259 K/UL (ref 150–350)
PMV BLD AUTO: 12.4 FL (ref 9.2–12.9)
POTASSIUM SERPL-SCNC: 3.8 MMOL/L (ref 3.5–5.1)
PROT SERPL-MCNC: 7.6 G/DL (ref 6–8.4)
RBC # BLD AUTO: 4.3 M/UL (ref 4–5.4)
SODIUM SERPL-SCNC: 139 MMOL/L (ref 136–145)
TRIGL SERPL-MCNC: 77 MG/DL (ref 30–150)
TSH SERPL DL<=0.005 MIU/L-ACNC: 1.57 UIU/ML (ref 0.4–4)
WBC # BLD AUTO: 6.07 K/UL (ref 3.9–12.7)

## 2019-04-25 PROCEDURE — 3075F SYST BP GE 130 - 139MM HG: CPT | Mod: HCNC,CPTII,S$GLB, | Performed by: FAMILY MEDICINE

## 2019-04-25 PROCEDURE — 80061 LIPID PANEL: CPT | Mod: HCNC

## 2019-04-25 PROCEDURE — 99214 OFFICE O/P EST MOD 30 MIN: CPT | Mod: HCNC,S$GLB,, | Performed by: FAMILY MEDICINE

## 2019-04-25 PROCEDURE — 3079F DIAST BP 80-89 MM HG: CPT | Mod: HCNC,CPTII,S$GLB, | Performed by: FAMILY MEDICINE

## 2019-04-25 PROCEDURE — 84443 ASSAY THYROID STIM HORMONE: CPT | Mod: HCNC

## 2019-04-25 PROCEDURE — 80053 COMPREHEN METABOLIC PANEL: CPT | Mod: HCNC

## 2019-04-25 PROCEDURE — 3075F PR MOST RECENT SYSTOLIC BLOOD PRESS GE 130-139MM HG: ICD-10-PCS | Mod: HCNC,CPTII,S$GLB, | Performed by: FAMILY MEDICINE

## 2019-04-25 PROCEDURE — 3079F PR MOST RECENT DIASTOLIC BLOOD PRESSURE 80-89 MM HG: ICD-10-PCS | Mod: HCNC,CPTII,S$GLB, | Performed by: FAMILY MEDICINE

## 2019-04-25 PROCEDURE — 1101F PT FALLS ASSESS-DOCD LE1/YR: CPT | Mod: HCNC,CPTII,S$GLB, | Performed by: FAMILY MEDICINE

## 2019-04-25 PROCEDURE — 85025 COMPLETE CBC W/AUTO DIFF WBC: CPT | Mod: HCNC

## 2019-04-25 PROCEDURE — 82306 VITAMIN D 25 HYDROXY: CPT | Mod: HCNC

## 2019-04-25 PROCEDURE — 99999 PR PBB SHADOW E&M-EST. PATIENT-LVL III: CPT | Mod: PBBFAC,HCNC,, | Performed by: FAMILY MEDICINE

## 2019-04-25 PROCEDURE — 36415 COLL VENOUS BLD VENIPUNCTURE: CPT | Mod: HCNC

## 2019-04-25 PROCEDURE — 99214 PR OFFICE/OUTPT VISIT, EST, LEVL IV, 30-39 MIN: ICD-10-PCS | Mod: HCNC,S$GLB,, | Performed by: FAMILY MEDICINE

## 2019-04-25 PROCEDURE — 99999 PR PBB SHADOW E&M-EST. PATIENT-LVL III: ICD-10-PCS | Mod: PBBFAC,HCNC,, | Performed by: FAMILY MEDICINE

## 2019-04-25 PROCEDURE — 1101F PR PT FALLS ASSESS DOC 0-1 FALLS W/OUT INJ PAST YR: ICD-10-PCS | Mod: HCNC,CPTII,S$GLB, | Performed by: FAMILY MEDICINE

## 2019-04-25 RX ORDER — AMLODIPINE BESYLATE 5 MG/1
5 TABLET ORAL DAILY
Qty: 90 TABLET | Refills: 1 | Status: SHIPPED | OUTPATIENT
Start: 2019-04-25 | End: 2019-05-31

## 2019-04-25 RX ORDER — FLUTICASONE PROPIONATE 50 MCG
2 SPRAY, SUSPENSION (ML) NASAL DAILY
Qty: 1 BOTTLE | Refills: 3 | Status: SHIPPED | OUTPATIENT
Start: 2019-04-25 | End: 2019-10-25 | Stop reason: SDUPTHER

## 2019-04-25 NOTE — PROGRESS NOTES
Subjective:       Patient ID: Tracy Najera is a 68 y.o. female.    Chief Complaint: Follow-up (htn) and Labs Only (fasting)    HPI     68       Anemia  HTN  CVA - R sided weakness  Arthritis  HLD  Tortuous Aorta  osteopenia      Nose is stuffy  Relates to weather  Changes  flonase seems to give a little relief.    Constipation  Mild  After colonoscopy needed to improve somewhat    BP seems improved per patient      Review of Systems   Constitutional: Negative for activity change and unexpected weight change.   HENT: Negative for hearing loss, rhinorrhea and trouble swallowing.    Eyes: Negative for discharge and visual disturbance.   Respiratory: Negative for chest tightness and wheezing.    Cardiovascular: Negative for chest pain and palpitations.   Gastrointestinal: Positive for constipation. Negative for blood in stool, diarrhea and vomiting.   Endocrine: Negative for polydipsia and polyuria.   Genitourinary: Negative for difficulty urinating, dysuria, hematuria and menstrual problem.   Musculoskeletal: Negative for arthralgias, joint swelling and neck pain.   Neurological: Negative for weakness and headaches.   Psychiatric/Behavioral: Negative for confusion and dysphoric mood.       Objective:       Vitals:    04/25/19 0845   BP: 136/82   Pulse: 82   Resp: 20   Temp: 97.1 °F (36.2 °C)       Physical Exam   Constitutional: She is oriented to person, place, and time. She appears well-developed and well-nourished. She does not have a sickly appearance. No distress.   HENT:   Head: Normocephalic and atraumatic.   Right Ear: External ear normal.   Left Ear: External ear normal.   Eyes: Conjunctivae, EOM and lids are normal.   Neck: Trachea normal, normal range of motion and full passive range of motion without pain.   Cardiovascular: Normal rate, regular rhythm and normal heart sounds.   Pulmonary/Chest: Effort normal and breath sounds normal. No stridor. No respiratory distress.   Abdominal: Soft. Normal  appearance and bowel sounds are normal. She exhibits no distension. There is no tenderness. There is no guarding. No hernia.   Musculoskeletal: Normal range of motion.   Lymphadenopathy:     She has no cervical adenopathy.   Neurological: She is alert and oriented to person, place, and time. She is not disoriented.   Skin: Skin is warm, dry and intact. No rash noted. She is not diaphoretic.   Psychiatric: She has a normal mood and affect. Her speech is normal and behavior is normal. Thought content normal.       Assessment:       1. Hypertension, unspecified type    2. History of CVA (cerebrovascular accident)    3. Sequelae of cerebrovascular disease     4. History of long-term treatment with high-risk medication    5. Allergic rhinitis, unspecified seasonality, unspecified trigger    6. Anemia, unspecified type    7. Constipation, unspecified constipation type        Plan:   Hypertension, unspecified type    Well controlled  Amlodipine 5  Losartan 100mg  Will continue  checking labs  Refill amlod today  Has refills on other meds.      History of CVA (cerebrovascular accident)  Sequelae of Stroke  A little R side weakness    On plavix  On statin  BP control good.  Will check lipids today    History of long-term treatment with high-risk medication    Labs      Allergic rhinitis, unspecified seasonality, unspecified trigger    -     fluticasone (FLONASE) 50 mcg/actuation nasal spray; 2 sprays (100 mcg total) by Each Nare route once daily.  Dispense: 1 Bottle; Refill: 3    Anemia  Slight decline at last cbc  Will check again  Recent colonoscopy  Repeat in 5 yr    Constipation  Diet mngt      No follow-ups on file.

## 2019-04-29 ENCOUNTER — PATIENT OUTREACH (OUTPATIENT)
Dept: OTHER | Facility: OTHER | Age: 69
End: 2019-04-29

## 2019-04-29 NOTE — PROGRESS NOTES
Last 5 Patient Entered Readings                                      Current 30 Day Average: 129/83     Recent Readings 4/24/2019 4/24/2019 4/23/2019 4/22/2019 4/21/2019    SBP (mmHg) 137 135 112 134 134    DBP (mmHg) 85 85 82 85 85    Pulse 71 70 68 67 72          No answer.  Just rings and stops.  Not able to leave a vm

## 2019-05-01 NOTE — PROGRESS NOTES
Last 5 Patient Entered Readings                                      Current 30 Day Average: 129/83     Recent Readings 4/24/2019 4/24/2019 4/23/2019 4/22/2019 4/21/2019    SBP (mmHg) 137 135 112 134 134    DBP (mmHg) 85 85 82 85 85    Pulse 71 70 68 67 72          Digital Medicine: Health  Introduction    Introduced Mrs. Tracy Najera to Digital Medicine. Discussed health  role and recommended lifestyle modifications.  Patient states her BP cuff is not working.  I instructed her that she needs to charge her cuff.  She agreed to do so.    Lifestyle Assessment:  Current Dietary Habits(i.e. low sodium, food labels, dining out):Patient states she is not adding salt to her food.  Eats frozen vegetables, not canned.  She states that she drinks 32 oz of water per day.    Exercise:Patient reports walking 10 - 15 minutes every day.    Alcohol/Tobacco:Patient denies any alcohol or tobacco use.    Medication Adherence: has been compliant with the medicaiton regimen  .  She would like her clinician to reach out to her as she has questions about her medication.    Other goals:    Reviewed AHA/AACE recommendations:  Limit sodium intake to <2000mg/day  Recommended CHO intake, 45-65% of daily caloric intake  Perform 150 minutes of physical activity per week    Reviewed the importance of self-monitoring, medication adherence, and that the health  can be used as a resource for lifestyle modifications to help reduce or maintain a healthy lifestyle.  Reviewed that the Digital Medicine team is not available for emergencies and instructed the patient to call 911 or [facility] ([phone] or [phone]) if one arises.

## 2019-05-02 ENCOUNTER — PATIENT MESSAGE (OUTPATIENT)
Dept: INTERNAL MEDICINE | Facility: CLINIC | Age: 69
End: 2019-05-02

## 2019-05-02 ENCOUNTER — PATIENT OUTREACH (OUTPATIENT)
Dept: OTHER | Facility: OTHER | Age: 69
End: 2019-05-02

## 2019-05-02 NOTE — PROGRESS NOTES
Last 5 Patient Entered Readings                                      Current 30 Day Average: 130/84     Recent Readings 5/2/2019 5/1/2019 5/1/2019 5/1/2019 4/24/2019    SBP (mmHg) 148 146 149 154 137    DBP (mmHg) 82 96 90 97 85    Pulse 67 70 75 78 71        Called patient to introduce her to the Hypertension Digital Medicine Program.     Ms. Najera's BP has been higher recently. She also notes some SAIMA in her RLE, not much in LLE. Explained that it may be due to amlodipine, but also could be diet related as she has been consuming more salt recently. Explained that the increase is salt intake can cause SAIMA and spikes in BP. Will monitor her readings for now, but if swelling does not improve, will discuss changing amlodipine.     Reviewed patient's medications and verified allergies on file.   Hypertension Medications             amLODIPine (NORVASC) 5 MG tablet Take 1 tablet (5 mg total) by mouth once daily.    losartan (COZAAR) 100 MG tablet Take 1 tablet (100 mg total) by mouth once daily.        Explained that we expect her to obtain several blood pressures/week at random times of day. Also asked that the BP be taken at least 1 hour after taking BP medications.     Explained that our goal is to get her BP to consistently below 130/80mmHg.     Patient and I agreed that the patient will take her BP daily to every other day at varying times of the day.     Emailed patient link to [facility] webpage as well as my direct phone number in case she has any questions.

## 2019-05-07 RX ORDER — ATORVASTATIN CALCIUM 40 MG/1
TABLET, FILM COATED ORAL
Qty: 90 TABLET | Refills: 0 | Status: SHIPPED | OUTPATIENT
Start: 2019-05-07 | End: 2019-10-25 | Stop reason: SDUPTHER

## 2019-05-11 RX ORDER — ATORVASTATIN CALCIUM 40 MG/1
TABLET, FILM COATED ORAL
Qty: 90 TABLET | Refills: 3 | Status: SHIPPED | OUTPATIENT
Start: 2019-05-11 | End: 2019-10-25 | Stop reason: SDUPTHER

## 2019-05-20 RX ORDER — LOSARTAN POTASSIUM 100 MG/1
TABLET ORAL
Qty: 90 TABLET | Refills: 1 | Status: SHIPPED | OUTPATIENT
Start: 2019-05-20 | End: 2019-10-25 | Stop reason: SDUPTHER

## 2019-05-21 ENCOUNTER — PATIENT OUTREACH (OUTPATIENT)
Dept: OTHER | Facility: OTHER | Age: 69
End: 2019-05-21

## 2019-05-21 NOTE — PROGRESS NOTES
Last 5 Patient Entered Readings                                      Current 30 Day Average: 128/81     Recent Readings 5/20/2019 5/20/2019 5/19/2019 5/19/2019 5/17/2019    SBP (mmHg) 127 141 130 136 142    DBP (mmHg) 67 68 78 97 84    Pulse 81 80 74 72 77        Digital Medicine: Health  Follow Up    Patient reports that she his feeling well.  She is happy with her BP readings overall.     Lifestyle Modifications:    1.Dietary Modifications (Sodium intake <2,000mg/day, food labels, dining out): Patient states that she continues to watch the amount of salt in her diet.  She reports that she has been trying to increase her water intake, but doesn't think that she is up to 64 oz of water per day yet.    2.Physical Activity: Patient continues her walking routine.    3.Medication Therapy: Patient has been compliant with the medication regimen.    4.Patient has the following medication side effects/concerns: none reported  (Frequency/Alleviating factors/Precipitating factors, etc.)     Follow up with Mrs. Tracy THOMAS Reinaldo completed. No further questions or concerns. Will continue to follow up to achieve health goals.

## 2019-05-31 ENCOUNTER — PATIENT OUTREACH (OUTPATIENT)
Dept: OTHER | Facility: OTHER | Age: 69
End: 2019-05-31

## 2019-05-31 DIAGNOSIS — I10 ESSENTIAL HYPERTENSION: Primary | ICD-10-CM

## 2019-05-31 RX ORDER — AMLODIPINE BESYLATE 5 MG/1
10 TABLET ORAL DAILY
Qty: 90 TABLET | Refills: 1
Start: 2019-05-31 | End: 2019-07-05

## 2019-05-31 NOTE — PROGRESS NOTES
Last 5 Patient Entered Readings                                      Current 30 Day Average: 129/83     Recent Readings 5/30/2019 5/30/2019 5/30/2019 5/27/2019 5/27/2019    SBP (mmHg) 139 138 139 139 152    DBP (mmHg) 84 90 89 89 89    Pulse 74 71 73 68 70        Per 30 day average, 129/83 mmHg, patient's BP is not at goal.     Ms. Najera's BP average is close to goal. Her BP fluctuates frequently. Will increase amlodipine to 10 mg QD. Reviewed possible side effect of SAIMA with dose increase. Asked patient to contact me should swelling of LE occur. Patient verbalized understanding.     Will continue to monitor regularly. Will follow up in 2-3 weeks, sooner if BP begins to trend upward or downward.    Asked patient to call or message with questions or concerns.     Current HTN regimen:  Hypertension Medications             amLODIPine (NORVASC) 5 MG tablet Take 2 tablets (10 mg total) by mouth once daily.    losartan (COZAAR) 100 MG tablet TAKE 1 TABLET BY MOUTH EVERY DAY

## 2019-06-17 ENCOUNTER — PATIENT OUTREACH (OUTPATIENT)
Dept: OTHER | Facility: OTHER | Age: 69
End: 2019-06-17

## 2019-06-17 NOTE — PROGRESS NOTES
Last 5 Patient Entered Readings                                      Current 30 Day Average: 130/80     Recent Readings 6/17/2019 6/16/2019 6/15/2019 6/14/2019 6/14/2019    SBP (mmHg) 113 144 108 138 152    DBP (mmHg) 69 83 83 84 92    Pulse 77 85 86 90 88        Ms. Najera is tolerating higher dose of amlodipine. She denies any changes in swelling. She continues to think it is related to salt intake. Her BP has started to trend down. Will continue current regimen for now.     Per 30 day average, 130/80 mmHg, patient's BP is approaching at goal.     Will continue to monitor regularly. Will follow up in 2-3 weeks, sooner if BP begins to trend upward or downward.    Asked patient to call or message with questions or concerns.     Current HTN regimen:  Hypertension Medications             amLODIPine (NORVASC) 5 MG tablet Take 2 tablets (10 mg total) by mouth once daily.    losartan (COZAAR) 100 MG tablet TAKE 1 TABLET BY MOUTH EVERY DAY

## 2019-06-24 ENCOUNTER — PATIENT OUTREACH (OUTPATIENT)
Dept: OTHER | Facility: OTHER | Age: 69
End: 2019-06-24

## 2019-06-24 NOTE — PROGRESS NOTES
Last 5 Patient Entered Readings                                      Current 30 Day Average: 129/80     Recent Readings 6/22/2019 6/21/2019 6/21/2019 6/20/2019 6/20/2019    SBP (mmHg) 122 137 153 128 129    DBP (mmHg) 77 78 87 78 86    Pulse 77 85 85 69 67          Digital Medicine: Health  Follow Up    Lifestyle Modifications:    1.Dietary Modifications (Sodium intake <2,000mg/day, food labels, dining out): Patient states that she has been eating less in general.  She says that her appetite has been lower lately.     2.Physical Activity: Patient states that she has been trying to be more active.  She states that she is walking daily.  She says that she usually goes to the mall or someplace inside to walk.    3.Medication Therapy: Patient has been compliant with the medication regimen.    4.Patient has the following medication side effects/concerns: none reported  (Frequency/Alleviating factors/Precipitating factors, etc.)     Follow up with Mrs. Tracy THOMAS Reinaldo completed. No further questions or concerns. Will continue to follow up to achieve health goals.

## 2019-07-05 ENCOUNTER — TELEPHONE (OUTPATIENT)
Dept: INTERNAL MEDICINE | Facility: CLINIC | Age: 69
End: 2019-07-05

## 2019-07-05 RX ORDER — AMLODIPINE BESYLATE 10 MG/1
10 TABLET ORAL DAILY
Qty: 90 TABLET | Refills: 1 | Status: SHIPPED | OUTPATIENT
Start: 2019-07-05 | End: 2019-12-10 | Stop reason: SDUPTHER

## 2019-07-05 NOTE — TELEPHONE ENCOUNTER
Returned call to patient. Patient states she needs a new prescription for amlodipine 10mg sent to pharmacy on file.

## 2019-07-05 NOTE — TELEPHONE ENCOUNTER
----- Message from Makeda Blanco sent at 7/5/2019 12:17 PM CDT -----  Contact: patient  Type:  RX Refill Request    Who Called: Patient  Refill or New Rx:refill  RX Name and Strength:Amlodipine, 10mg  How is the patient currently taking it? (ex. 1XDay):once daily  Is this a 30 day or 90 day RX:90  Preferred Pharmacy with phone number:[facility]  Local or Mail Order:Local  Ordering Provider:Dr Oakes  Would the patient rather a call back or a response via [facility]? Call  Best Call Back Number:[phone]  Additional Information:

## 2019-07-23 ENCOUNTER — PATIENT OUTREACH (OUTPATIENT)
Dept: OTHER | Facility: OTHER | Age: 69
End: 2019-07-23

## 2019-07-23 NOTE — PROGRESS NOTES
Last 5 Patient Entered Readings                                      Current 30 Day Average: 121/76     Recent Readings 7/23/2019 7/23/2019 7/22/2019 7/22/2019 7/21/2019    SBP (mmHg) 126 146 112 111 125    DBP (mmHg) 78 85 69 69 77    Pulse 78 81 74 74 74        Digital Medicine: Health  Follow Up    Lifestyle Modifications:    1.Dietary Modifications (Sodium intake <2,000mg/day, food labels, dining out): Patient says that she is watching her salt intake and eating more fresh vegetables.       2.Physical Activity: Patient says that she has been exercising daily.  She goes walking and does jumping jacks.    3.Medication Therapy: Patient has been compliant with the medication regimen.    4.Patient has the following medication side effects/concerns: none reported  (Frequency/Alleviating factors/Precipitating factors, etc.)     Follow up with  Tracy THOMAS Reinaldo completed. No further questions or concerns. Will continue to follow up to achieve health goals.

## 2019-07-28 DIAGNOSIS — I69.30 HISTORY OF CVA WITH RESIDUAL DEFICIT: ICD-10-CM

## 2019-07-29 ENCOUNTER — PATIENT OUTREACH (OUTPATIENT)
Dept: OTHER | Facility: OTHER | Age: 69
End: 2019-07-29

## 2019-07-29 RX ORDER — CLOPIDOGREL BISULFATE 75 MG/1
TABLET ORAL
Qty: 90 TABLET | Refills: 3 | Status: SHIPPED | OUTPATIENT
Start: 2019-07-29 | End: 2020-06-23 | Stop reason: SDUPTHER

## 2019-07-29 NOTE — PROGRESS NOTES
Last 5 Patient Entered Readings                                      Current 30 Day Average: 122/76     Recent Readings 7/28/2019 7/27/2019 7/26/2019 7/24/2019 7/23/2019    SBP (mmHg) 128 112 124 135 126    DBP (mmHg) 83 68 82 79 78    Pulse 74 77 68 71 78        Ms. Ramirezs BP continues to improve. She is looking into attending a class sponsored by [employer] to help manage her HTN. Will continue current regimen.     Per 30 day average, 122/76 mmHg, patient's BP is at goal.     Patient's health , Sabina Bradford, will be following up. I will continue to monitor regularly and will follow up in 4-6 months, sooner if BP begins to trend upward or downward.    Asked patient to call or message with questions or concerns.     Current HTN regimen:  Hypertension Medications             amLODIPine (NORVASC) 10 MG tablet Take 1 tablet (10 mg total) by mouth once daily.    losartan (COZAAR) 100 MG tablet TAKE 1 TABLET BY MOUTH EVERY DAY

## 2019-08-20 ENCOUNTER — PATIENT OUTREACH (OUTPATIENT)
Dept: OTHER | Facility: OTHER | Age: 69
End: 2019-08-20

## 2019-08-20 NOTE — PROGRESS NOTES
Last 5 Patient Entered Readings                                      Current 30 Day Average: 124/79     Recent Readings 8/19/2019 8/19/2019 8/18/2019 8/18/2019 8/18/2019    SBP (mmHg) 147 155 147 171 129    DBP (mmHg) 81 85 61 76 88    Pulse 76 77 74 76 76        Digital Medicine: Health  Follow Up    Patient says that she cannot remember the last time she charged her blood pressure cuff.  She says she will charge it today and take another reading to see if it makes a difference.    Lifestyle Modifications:    1.Dietary Modifications (Sodium intake <2,000mg/day, food labels, dining out): Patient says that she has been eating less.  She says that she weighed 194 lbs on her scale at home this morning.  She says that she has been eating smaller portions.    2.Physical Activity: Patient says that she does some walking every day for at least 30 min throughout the day.    3.Medication Therapy: Patient has been compliant with the medication regimen.    4.Patient has the following medication side effects/concerns: none reported  (Frequency/Alleviating factors/Precipitating factors, etc.)     Follow up with Paola Tracy THOMAS Reinaldo completed. No further questions or concerns. Will continue to follow up to achieve health goals.

## 2019-09-18 ENCOUNTER — PATIENT OUTREACH (OUTPATIENT)
Dept: OTHER | Facility: OTHER | Age: 69
End: 2019-09-18

## 2019-09-18 NOTE — PROGRESS NOTES
Digital Medicine: Health  Follow-Up    HPI        Diet:   Patient reports eating or drinking the following: fruit, water, fresh vegetables and lean proteinsShe has the following dietary restrictions: low sodium dietShe does not skip meals regularly.  She has no standard fasting periods.      Patient did not have times when they could not afford food or ran out.      Intervention(s): portion control    Physical Activity:   When asked if exercising, patient responded: yes7 day(s) a week.      Patient participates in the following activities: walking    Medication Adherence:   She misses doses: never        SDOH    Intervention/Plan    There are no preventive care reminders to display for this patient.    Last 5 Patient Entered Readings                                      Current 30 Day Average: 123/81     Recent Readings 9/18/2019 9/17/2019 9/16/2019 9/15/2019 9/14/2019    SBP (mmHg) 116 109 131 110 116    DBP (mmHg) 70 71 68 72 79    Pulse 80 77 81 81 87

## 2019-10-16 ENCOUNTER — PATIENT OUTREACH (OUTPATIENT)
Dept: OTHER | Facility: OTHER | Age: 69
End: 2019-10-16

## 2019-10-16 NOTE — PROGRESS NOTES
Digital Medicine: Health  Follow-Up    Patient says that she is feeling well.  She says she does not know why her readings go up and down.  Reviewed technique for taking her readings.    The history is provided by the patient.     Follow Up  Follow-up reason(s): reading review              Physical Activity:   When asked if exercising, patient responded: yes7 day(s) a week.      Patient participates in the following activities: walking    Medication Adherence:   She misses doses: never        SDOH    INTERVENTION(S)  reviewed monitoring technique and encouragement/support    PLAN  patient verbalizes understanding      There are no preventive care reminders to display for this patient.    Last 5 Patient Entered Readings                                      Current 30 Day Average: 123/82     Recent Readings 10/16/2019 10/15/2019 10/15/2019 10/14/2019 10/14/2019    SBP (mmHg) 106 125 145 145 152    DBP (mmHg) 71 78 96 72 90    Pulse 77 76 72 75 70

## 2019-10-25 ENCOUNTER — OFFICE VISIT (OUTPATIENT)
Dept: INTERNAL MEDICINE | Facility: CLINIC | Age: 69
End: 2019-10-25
Payer: MEDICARE

## 2019-10-25 ENCOUNTER — IMMUNIZATION (OUTPATIENT)
Dept: PHARMACY | Facility: CLINIC | Age: 69
End: 2019-10-25
Payer: MEDICAID

## 2019-10-25 VITALS
HEART RATE: 78 BPM | RESPIRATION RATE: 18 BRPM | SYSTOLIC BLOOD PRESSURE: 125 MMHG | OXYGEN SATURATION: 98 % | DIASTOLIC BLOOD PRESSURE: 85 MMHG | WEIGHT: 196.63 LBS | TEMPERATURE: 97 F | BODY MASS INDEX: 33.76 KG/M2

## 2019-10-25 DIAGNOSIS — I10 HYPERTENSION, UNSPECIFIED TYPE: Primary | ICD-10-CM

## 2019-10-25 DIAGNOSIS — Z86.73 HISTORY OF STROKE: ICD-10-CM

## 2019-10-25 DIAGNOSIS — Z23 IMMUNIZATION DUE: ICD-10-CM

## 2019-10-25 DIAGNOSIS — J30.9 ALLERGIC RHINITIS, UNSPECIFIED SEASONALITY, UNSPECIFIED TRIGGER: ICD-10-CM

## 2019-10-25 DIAGNOSIS — Z79.899 ENCOUNTER FOR LONG-TERM CURRENT USE OF MEDICATION: ICD-10-CM

## 2019-10-25 PROCEDURE — 3079F PR MOST RECENT DIASTOLIC BLOOD PRESSURE 80-89 MM HG: ICD-10-PCS | Mod: HCNC,CPTII,S$GLB, | Performed by: FAMILY MEDICINE

## 2019-10-25 PROCEDURE — 99999 PR PBB SHADOW E&M-EST. PATIENT-LVL III: CPT | Mod: PBBFAC,HCNC,, | Performed by: FAMILY MEDICINE

## 2019-10-25 PROCEDURE — 99999 PR PBB SHADOW E&M-EST. PATIENT-LVL III: ICD-10-PCS | Mod: PBBFAC,HCNC,, | Performed by: FAMILY MEDICINE

## 2019-10-25 PROCEDURE — 1101F PR PT FALLS ASSESS DOC 0-1 FALLS W/OUT INJ PAST YR: ICD-10-PCS | Mod: HCNC,CPTII,S$GLB, | Performed by: FAMILY MEDICINE

## 2019-10-25 PROCEDURE — 99214 OFFICE O/P EST MOD 30 MIN: CPT | Mod: HCNC,S$GLB,, | Performed by: FAMILY MEDICINE

## 2019-10-25 PROCEDURE — 3074F SYST BP LT 130 MM HG: CPT | Mod: HCNC,CPTII,S$GLB, | Performed by: FAMILY MEDICINE

## 2019-10-25 PROCEDURE — 99214 PR OFFICE/OUTPT VISIT, EST, LEVL IV, 30-39 MIN: ICD-10-PCS | Mod: HCNC,S$GLB,, | Performed by: FAMILY MEDICINE

## 2019-10-25 PROCEDURE — 3079F DIAST BP 80-89 MM HG: CPT | Mod: HCNC,CPTII,S$GLB, | Performed by: FAMILY MEDICINE

## 2019-10-25 PROCEDURE — 1101F PT FALLS ASSESS-DOCD LE1/YR: CPT | Mod: HCNC,CPTII,S$GLB, | Performed by: FAMILY MEDICINE

## 2019-10-25 PROCEDURE — 3074F PR MOST RECENT SYSTOLIC BLOOD PRESSURE < 130 MM HG: ICD-10-PCS | Mod: HCNC,CPTII,S$GLB, | Performed by: FAMILY MEDICINE

## 2019-10-25 RX ORDER — LOSARTAN POTASSIUM 100 MG/1
100 TABLET ORAL DAILY
Qty: 90 TABLET | Refills: 1 | Status: SHIPPED | OUTPATIENT
Start: 2019-10-25 | End: 2020-05-19

## 2019-10-25 RX ORDER — ATORVASTATIN CALCIUM 40 MG/1
40 TABLET, FILM COATED ORAL DAILY
Qty: 90 TABLET | Refills: 1 | Status: SHIPPED | OUTPATIENT
Start: 2019-10-25 | End: 2020-05-04

## 2019-10-25 RX ORDER — FLUTICASONE PROPIONATE 50 MCG
2 SPRAY, SUSPENSION (ML) NASAL DAILY
Qty: 1 BOTTLE | Refills: 3 | Status: SHIPPED | OUTPATIENT
Start: 2019-10-25 | End: 2020-03-27

## 2019-10-25 NOTE — PROGRESS NOTES
Subjective:       Patient ID: Tracy Najera is a 69 y.o. female.    Chief Complaint: Follow-up    HPI     69    Anemia  HTN  CVA - R sided weakness  Arthritis  HLD  Tortuous Aorta  Osteopenia  Vit D insuff    Social History     Tobacco Use   Smoking Status Never Smoker   Smokeless Tobacco Never Used     Family History   Problem Relation Age of Onset    Hypertension Mother     Hypertension Sister     Stroke Brother     Cancer Neg Hx     Diabetes Neg Hx     Heart disease Neg Hx     Kidney disease Neg Hx          Feeling well  No CP  No SOB    Mild R sided weakness - - stable though    Taking BP med  Noted some elevations    Due for shingles shot  Wants to get to it done    Had flu shot    Tolerating all meds  No muscle aches      Review of Systems   Constitutional: Positive for activity change.   Eyes: Negative for discharge.   Respiratory: Negative for wheezing.    Cardiovascular: Negative for chest pain and palpitations.   Gastrointestinal: Negative for constipation, diarrhea and vomiting.   Genitourinary: Negative for difficulty urinating and hematuria.   Neurological: Negative for headaches.   Psychiatric/Behavioral: Negative for dysphoric mood.       Objective:       Vitals:    10/25/19 1016   BP: 125/85   Pulse:    Resp:    Temp:      Vitals:    10/25/19 1016   BP: 125/85   Pulse:    Resp:    Temp:        Physical Exam   Constitutional: She is oriented to person, place, and time. She appears well-developed. She does not have a sickly appearance. No distress.   HENT:   Head: Normocephalic and atraumatic.   Eyes: Conjunctivae and lids are normal.   Neck: Normal range of motion and full passive range of motion without pain.   Cardiovascular: Normal rate, regular rhythm and normal heart sounds.   Pulmonary/Chest: Effort normal and breath sounds normal. No respiratory distress.   Abdominal: Soft. Normal appearance. She exhibits no distension.   Musculoskeletal: Normal range of motion.   Lymphadenopathy:      She has no cervical adenopathy.   Neurological: She is alert and oriented to person, place, and time. She is not disoriented.   Skin: Skin is intact. No pallor.   Psychiatric: She has a normal mood and affect. Her speech is normal and behavior is normal. Thought content normal.       Assessment:       1. Hypertension, unspecified type    2. History of stroke    3. Encounter for long-term current use of medication    4. Allergic rhinitis, unspecified seasonality, unspecified trigger    5. Immunization due        Plan:       HTN  Controlled  Amlodipine and losartan  Continue meds    H/O Stroke  plavix  Statin  Stable    Allergic rhinitis  Refill flonase    Long term med  Tolerated all meds  Needs refills    6 mo f/u  Shingles vaccine

## 2019-11-06 ENCOUNTER — PATIENT OUTREACH (OUTPATIENT)
Dept: OTHER | Facility: OTHER | Age: 69
End: 2019-11-06

## 2019-11-06 NOTE — PROGRESS NOTES
Digital Medicine: Clinician Follow-Up        Follow Up  Follow-up reason(s): reading review      Alert received. Care Team received high BP alert.  Patient had an elevated BP today. BP has trended up recently. She cannot identify anything that would cause the upward trend. She has been sitting on a sofa to check her BP and has not charged her monitor in about 1 month.       INTERVENTION(S)  reviewed monitoring technique    Per 30 day average, 128/87 mmHg, patient's BP is not at goal.     Asked that she start sitting at the kitchen table going forward when checking her BP. Also reminded her to charge her BP monitor every 2-3 weeks. No changes required at this time. If BP remains elevated, will change losartan to valsartan.     Will continue to monitor regularly. Will follow up in 2-3 weeks, sooner if BP begins to trend upward or downward.    Asked patient to call or message with questions or concerns.      Last 5 Patient Entered Readings                                      Current 30 Day Average: 128/87     Recent Readings 11/6/2019 11/6/2019 11/6/2019 11/4/2019 11/4/2019    SBP (mmHg) 151 138 144 136 146    DBP (mmHg) 95 92 110 76 86    Pulse 80 83 84 77 78             Hypertension Medications             amLODIPine (NORVASC) 10 MG tablet Take 1 tablet (10 mg total) by mouth once daily.    losartan (COZAAR) 100 MG tablet Take 1 tablet (100 mg total) by mouth once daily.

## 2019-11-13 ENCOUNTER — PATIENT OUTREACH (OUTPATIENT)
Dept: OTHER | Facility: OTHER | Age: 69
End: 2019-11-13

## 2019-11-13 NOTE — PROGRESS NOTES
Digital Medicine: Health  Follow-Up    Patient says that she is feeling good.  She stated that she does not know what caused her higher readings yesterday. She says that she is taking her blood pressure while sitting at the kitchen table.  We reviewed proper technique again and I emphasized making sure the cuff is not too tight and giving herself a couple of minutes to relax before starting the reading.    The history is provided by the patient. No  was used.       INTERVENTION(S)  reviewed monitoring technique and encouragement/support    PLAN  patient verbalizes understanding and continue monitoring      There are no preventive care reminders to display for this patient.    Last 5 Patient Entered Readings                                      Current 30 Day Average: 132/87     Recent Readings 11/12/2019 11/12/2019 11/8/2019 11/8/2019 11/6/2019    SBP (mmHg) 150 129 137 137 131    DBP (mmHg) 79 91 73 85 84    Pulse 78 80 76 74 79                      Diet Screening   Patient reports eating or drinking the following: fruit, fresh vegetables and meatShe has the following dietary restrictions: low sodium dietShe does not skip meals regularly.  She has no standard fasting periods.      Patient says that she is only eating meat, fruits, and vegetables.    Assigning the following patient goals: maintain low sodium diet    Physical Activity Screening   When asked if exercising, patient responded: yes4 day(s) a week.      Patient participates in the following activities: walking    Patient says that she is focusing on walking whenever she can when she is doing her errands and going for walks more.    Assigning the following patient goal(s): 150 minutes of exercise per week    Medication Adherence Screening   She misses doses: never    Patient is not selectively taking diuretics.    She does not wonder if medications are working.  She knows purpose of medications.        SDOH

## 2019-12-10 RX ORDER — AMLODIPINE BESYLATE 10 MG/1
TABLET ORAL
Qty: 90 TABLET | Refills: 1 | Status: SHIPPED | OUTPATIENT
Start: 2019-12-10 | End: 2020-06-07

## 2019-12-11 ENCOUNTER — PATIENT OUTREACH (OUTPATIENT)
Dept: OTHER | Facility: OTHER | Age: 69
End: 2019-12-11

## 2019-12-11 NOTE — PROGRESS NOTES
Digital Medicine: Health  Follow-Up    Patient says that she got a new blood pressure cuff and the high reading was taken while standing at the [facility].    The history is provided by the patient. No  was used.     Follow Up  Follow-up reason(s): reading review          INTERVENTION(S)  recommended diet modifications, recommend physical activity and encouragement/support    PLAN  patient verbalizes understanding and continue monitoring      There are no preventive care reminders to display for this patient.    Last 5 Patient Entered Readings                                      Current 30 Day Average: 123/80     Recent Readings 12/10/2019 12/10/2019 12/4/2019 12/4/2019 11/26/2019    SBP (mmHg) 123 163 118 121 127    DBP (mmHg) 61 98 64 83 69    Pulse 82 100 76 76 86                      Diet Screening   She has the following dietary restrictions: low sodium diet    Patient states that she has gained some weight.  She says that she did eat more during the Thanksgiving Holiday.      Intervention(s): portion control and calorie tracking    Assigning the following patient goals: reduce portions, track calories and maintain low sodium diet    Physical Activity Screening   When asked if exercising, patient responded: yesHer level of intensity when exercising is low.    Patient participates in the following activities: walking    Discussed the importance of increasing physical activity to burn off extra calories taken in during the holidays.    Assigning the following patient goal(s): increase physical activity and participate in exercise weekly      SDOH

## 2020-01-15 ENCOUNTER — PATIENT OUTREACH (OUTPATIENT)
Dept: OTHER | Facility: OTHER | Age: 70
End: 2020-01-15

## 2020-01-15 NOTE — PROGRESS NOTES
Digital Medicine: Health  Follow-Up    Called patient for follow up.  Reviewed proper technique for taking blood pressure.  Patient verbally expressed understanding.    The history is provided by the patient. No  was used.     Follow Up  Follow-up reason(s): reading review          INTERVENTION(S)  reviewed monitoring technique and encouragement/support    PLAN  patient verbalizes understanding and continue monitoring      There are no preventive care reminders to display for this patient.    Last 5 Patient Entered Readings                                      Current 30 Day Average: 131/77     Recent Readings 1/14/2020 1/14/2020 1/13/2020 1/13/2020 1/10/2020    SBP (mmHg) 154 141 99 143 127    DBP (mmHg) 83 92 58 100 81    Pulse 85 86 90 94 86                      Diet Screening   No change to diet.  Patient reports eating or drinking the following: fruit, water, fresh vegetables and meat, riceShe has the following dietary restrictions: low sodium dietShe cooks for self.    Patient does the shopping for groceries.  She gets groceries from the grocery store.      Patient states that she has been eating less and has lost a few pounds.    Assigning the following patient goals: maintain low sodium diet    Physical Activity Screening   No change to exercise routine.    When asked if exercising, patient responded: yes    Patient participates in the following activities: walking    Medication Adherence Screening   She misses doses: never    Patient is not selectively taking diuretics.    She does not wonder if medications are working.  She knows purpose of medications.        SDOH

## 2020-02-11 ENCOUNTER — TELEPHONE (OUTPATIENT)
Dept: INTERNAL MEDICINE | Facility: CLINIC | Age: 70
End: 2020-02-11

## 2020-02-11 DIAGNOSIS — Z12.39 BREAST CANCER SCREENING: Primary | ICD-10-CM

## 2020-02-11 NOTE — TELEPHONE ENCOUNTER
----- Message from Jodi Aden sent at 2/11/2020  1:47 PM CST -----  Contact: Patient  .Type:  Mammogram    Caller is requesting to schedule their annual mammogram appointment.  Order is not listed in EPIC.  Please enter order and contact patient to schedule.  Name of Caller:Patient  Where would they like the mammogram performed? [facility]  Would the patient rather a call back or a response via [facility]?   Best Call Back Number: .   Additional Information:

## 2020-02-11 NOTE — TELEPHONE ENCOUNTER
Sent a message for Dr Oakes to put in an order for a mammogram.  Will call the patient to schedule. /sl/

## 2020-02-12 DIAGNOSIS — Z12.39 SCREENING FOR BREAST CANCER: Primary | ICD-10-CM

## 2020-02-12 DIAGNOSIS — Z12.31 ENCOUNTER FOR SCREENING MAMMOGRAM FOR MALIGNANT NEOPLASM OF BREAST: ICD-10-CM

## 2020-02-14 DIAGNOSIS — Z12.31 ENCOUNTER FOR SCREENING MAMMOGRAM FOR MALIGNANT NEOPLASM OF BREAST: ICD-10-CM

## 2020-02-14 DIAGNOSIS — Z12.39 SCREENING FOR BREAST CANCER: Primary | ICD-10-CM

## 2020-02-17 ENCOUNTER — TELEPHONE (OUTPATIENT)
Dept: INTERNAL MEDICINE | Facility: CLINIC | Age: 70
End: 2020-02-17

## 2020-02-17 NOTE — TELEPHONE ENCOUNTER
----- Message from Mike Oakes MD sent at 2/14/2020  5:18 PM CST -----  Order placed - please ensure she is actually due for sofi.  ----- Message -----  From: Stanley Johnson LPN  Sent: 2/11/2020   2:01 PM CST  To: Mike Oakes MD    [facility]

## 2020-02-26 ENCOUNTER — PATIENT OUTREACH (OUTPATIENT)
Dept: OTHER | Facility: OTHER | Age: 70
End: 2020-02-26

## 2020-02-26 ENCOUNTER — HOSPITAL ENCOUNTER (OUTPATIENT)
Dept: RADIOLOGY | Facility: HOSPITAL | Age: 70
Discharge: HOME | End: 2020-02-26
Attending: FAMILY MEDICINE
Payer: MEDICARE

## 2020-02-26 VITALS — WEIGHT: 196.19 LBS | BODY MASS INDEX: 33.49 KG/M2 | HEIGHT: 64 IN

## 2020-02-26 DIAGNOSIS — Z12.31 ENCOUNTER FOR SCREENING MAMMOGRAM FOR MALIGNANT NEOPLASM OF BREAST: ICD-10-CM

## 2020-02-26 DIAGNOSIS — Z12.39 SCREENING FOR BREAST CANCER: ICD-10-CM

## 2020-02-26 PROCEDURE — 77067 SCR MAMMO BI INCL CAD: CPT | Mod: TC,HCNC

## 2020-02-26 PROCEDURE — 77067 SCR MAMMO BI INCL CAD: CPT | Mod: 26,HCNC,, | Performed by: RADIOLOGY

## 2020-02-26 PROCEDURE — 77063 BREAST TOMOSYNTHESIS BI: CPT | Mod: 26,HCNC,, | Performed by: RADIOLOGY

## 2020-02-26 PROCEDURE — 77063 MAMMO DIGITAL SCREENING BILAT WITH TOMOSYNTHESIS_CAD: ICD-10-PCS | Mod: 26,HCNC,, | Performed by: RADIOLOGY

## 2020-02-26 PROCEDURE — 77067 MAMMO DIGITAL SCREENING BILAT WITH TOMOSYNTHESIS_CAD: ICD-10-PCS | Mod: 26,HCNC,, | Performed by: RADIOLOGY

## 2020-02-27 ENCOUNTER — PATIENT OUTREACH (OUTPATIENT)
Dept: OTHER | Facility: OTHER | Age: 70
End: 2020-02-27

## 2020-02-27 NOTE — PROGRESS NOTES
----- Message from Rosa Mcmahon MD sent at 12/1/2019 12:44 PM CST -----  Regarding: ED Follow-up Needed  Dear Dr. You,    Patient was seen in the emergency department at [facility] with COPD exacerbation. Patient already on antibiotics from urgent care but discharged home after improvement after breathing treatments with prednisone. Would request for patient to have a follow-up appointment in the next week to make sure that he is improving. Patient is going to call the office but I said I would also message you. Thank you so much for your help in this matter. Hope you had a nice Thanksgiving.    Sincerely,    Rosa Mcmahon MD  PGY-3 U Emergency Medicine   Digital Medicine: Health  Follow-Up    Patient denies any symptoms of hyper or hypotension.  Patient attributes lower blood pressure readings to positive lifestyle changes.    The history is provided by the patient. No  was used.       INTERVENTION(S)  encouragement/support, denied resources and denied questions    PLAN  continue monitoring      There are no preventive care reminders to display for this patient.    Last 5 Patient Entered Readings                                      Current 30 Day Average: 128/68     Recent Readings 2/21/2020 2/21/2020 2/19/2020 2/18/2020 2/16/2020    SBP (mmHg) 109 116 130 121 141    DBP (mmHg) 53 51 62 75 70    Pulse 67 65 77 81 76                      Diet Screening   Patient reports eating or drinking the following: fruit, water, fresh vegetables and lean proteins    The patient drinks 64 ounces of water per day.    She has the following dietary restrictions: low sodium dietShe cooks for self.    Patient does the shopping for groceries.  She gets groceries from the grocery store.      Patient says she has reduces portions.    Assigning the following patient goals: maintain low sodium diet    Physical Activity Screening   When asked if exercising, patient responded: yes6 day(s) a week.      Patient participates in the following activities: walking and body weight exercises and jumping jacks    Assigning the following patient goal(s): participate in exercise weekly    Medication Adherence Screening   She did not miss a dose this month.  Patient knows purpose of medications.        SDOH

## 2020-03-04 ENCOUNTER — TELEPHONE (OUTPATIENT)
Dept: INTERNAL MEDICINE | Facility: CLINIC | Age: 70
End: 2020-03-04

## 2020-03-04 NOTE — TELEPHONE ENCOUNTER
----- Message from Mike Oakes MD sent at 3/4/2020 12:14 PM CST -----  Please call the patient regarding her mammogram    No mammographic evidence of malignancy.          Recommendation:  Routine screening mammogram in 1 year is recommended.

## 2020-03-27 RX ORDER — FLUTICASONE PROPIONATE 50 MCG
2 SPRAY, SUSPENSION (ML) NASAL DAILY
Qty: 48 ML | Refills: 1 | Status: SHIPPED | OUTPATIENT
Start: 2020-03-27 | End: 2020-12-17

## 2020-04-09 ENCOUNTER — PATIENT OUTREACH (OUTPATIENT)
Dept: OTHER | Facility: OTHER | Age: 70
End: 2020-04-09

## 2020-04-09 NOTE — PROGRESS NOTES
Digital Medicine: Health  Follow-Up    Called patient for health  follow up.  She states that she is feeling well and denies any symptoms of hyper or hypotension.    The history is provided by the patient. No  was used.     Follow Up  Follow-up reason(s): reading review      Readings are trending down       INTERVENTION(S)  denied resources and denied questions    PLAN  continue monitoring      There are no preventive care reminders to display for this patient.    Last 5 Patient Entered Readings                                      Current 30 Day Average: 121/74     Recent Readings 4/7/2020 4/7/2020 4/5/2020 4/4/2020 3/31/2020    SBP (mmHg) 111 136 136 123 126    DBP (mmHg) 68 68 68 86 74    Pulse 76 79 79 70 66                      Diet Screening   No change to diet.  Patient reports eating or drinking the following: fresh vegetables and lean proteins, riceShe has the following dietary restrictions: low sodium dietShe cooks for self.    Patient does the shopping for groceries.  She gets groceries from the grocery store.      Barriers to a Healthy Diet: no barriers to healthy eating    Assigning the following patient goals: maintain low sodium diet    Physical Activity Screening   No change to exercise routine.    When asked if exercising, patient responded: yes    Patient participates in the following activities: walking    Assigning the following patient goal(s): 150 minutes of exercise per week    Medication Adherence Screening   She did not miss a dose this month.  Patient knows purpose of medications.      Patient identified the following reasons for non-compliance: None    Patient stated that she has plenty of blood pressure medication.      SDOH

## 2020-04-16 ENCOUNTER — TELEPHONE (OUTPATIENT)
Dept: INTERNAL MEDICINE | Facility: CLINIC | Age: 70
End: 2020-04-16

## 2020-05-04 RX ORDER — ATORVASTATIN CALCIUM 40 MG/1
TABLET, FILM COATED ORAL
Qty: 90 TABLET | Refills: 1 | Status: SHIPPED | OUTPATIENT
Start: 2020-05-04 | End: 2020-10-15

## 2020-05-19 RX ORDER — LOSARTAN POTASSIUM 100 MG/1
TABLET ORAL
Qty: 90 TABLET | Refills: 1 | Status: SHIPPED | OUTPATIENT
Start: 2020-05-19 | End: 2020-11-16

## 2020-06-09 ENCOUNTER — PATIENT OUTREACH (OUTPATIENT)
Dept: OTHER | Facility: OTHER | Age: 70
End: 2020-06-09

## 2020-06-09 NOTE — PROGRESS NOTES
Digital Medicine: Health  Follow-Up    Patient returned my call for follow up.  She states she is feeling well and is happy with her blood pressure readings.      The history is provided by the patient. No  was used.   Follow Up  Follow-up reason(s): reading review          INTERVENTION(S)  denied resources and denied questions    PLAN  continue monitoring          Topic    Lipid (Cholesterol) Test        Last 5 Patient Entered Readings                                      Current 30 Day Average: 126/77     Recent Readings 6/9/2020 6/8/2020 6/7/2020 6/2/2020 5/28/2020    SBP (mmHg) 130 127 125 137 128    DBP (mmHg) 79 78 77 83 70    Pulse 81 85 76 77 74                      Diet Screening   No change to diet.  Patient reports eating or drinking the following: fruit, water, fresh vegetables and lean proteins, grainsShe has the following dietary restrictions: low sodium dietShe cooks for self.    Patient does the shopping for groceries.  She gets groceries from the grocery store.      Barriers to a Healthy Diet: no barriers to healthy eating    Assigning the following patient goals: maintain low sodium diet    Physical Activity Screening   No change to exercise routine.    When asked if exercising, patient responded: yes5 day(s) a week.      Patient participates in the following activities: walking    Assigning the following patient goal(s): participate in exercise weekly    Medication Adherence Screening   She did not miss a dose this month.  Patient knows purpose of medications.      Patient identified the following reasons for non-compliance: None      SDOH

## 2020-06-23 ENCOUNTER — IMMUNIZATION (OUTPATIENT)
Dept: PHARMACY | Facility: CLINIC | Age: 70
End: 2020-06-23
Payer: MEDICAID

## 2020-06-23 ENCOUNTER — OFFICE VISIT (OUTPATIENT)
Dept: INTERNAL MEDICINE | Facility: CLINIC | Age: 70
End: 2020-06-23
Payer: MEDICARE

## 2020-06-23 VITALS
OXYGEN SATURATION: 99 % | BODY MASS INDEX: 36.1 KG/M2 | TEMPERATURE: 99 F | HEART RATE: 103 BPM | SYSTOLIC BLOOD PRESSURE: 130 MMHG | DIASTOLIC BLOOD PRESSURE: 70 MMHG | WEIGHT: 210.31 LBS

## 2020-06-23 DIAGNOSIS — I10 HYPERTENSION, UNSPECIFIED TYPE: ICD-10-CM

## 2020-06-23 DIAGNOSIS — Z79.899 ENCOUNTER FOR LONG-TERM CURRENT USE OF MEDICATION: ICD-10-CM

## 2020-06-23 DIAGNOSIS — I69.30 HISTORY OF CVA WITH RESIDUAL DEFICIT: ICD-10-CM

## 2020-06-23 DIAGNOSIS — Z00.00 ANNUAL PHYSICAL EXAM: Primary | ICD-10-CM

## 2020-06-23 PROCEDURE — 3075F SYST BP GE 130 - 139MM HG: CPT | Mod: HCNC,CPTII,S$GLB, | Performed by: FAMILY MEDICINE

## 2020-06-23 PROCEDURE — 99999 PR PBB SHADOW E&M-EST. PATIENT-LVL III: ICD-10-PCS | Mod: PBBFAC,HCNC,, | Performed by: FAMILY MEDICINE

## 2020-06-23 PROCEDURE — 1101F PT FALLS ASSESS-DOCD LE1/YR: CPT | Mod: HCNC,CPTII,S$GLB, | Performed by: FAMILY MEDICINE

## 2020-06-23 PROCEDURE — 99499 UNLISTED E&M SERVICE: CPT | Mod: HCNC,S$GLB,, | Performed by: FAMILY MEDICINE

## 2020-06-23 PROCEDURE — 1126F PR PAIN SEVERITY QUANTIFIED, NO PAIN PRESENT: ICD-10-PCS | Mod: HCNC,S$GLB,, | Performed by: FAMILY MEDICINE

## 2020-06-23 PROCEDURE — 99499 RISK ADDL DX/OHS AUDIT: ICD-10-PCS | Mod: HCNC,S$GLB,, | Performed by: FAMILY MEDICINE

## 2020-06-23 PROCEDURE — 99214 OFFICE O/P EST MOD 30 MIN: CPT | Mod: HCNC,S$GLB,, | Performed by: FAMILY MEDICINE

## 2020-06-23 PROCEDURE — 1101F PR PT FALLS ASSESS DOC 0-1 FALLS W/OUT INJ PAST YR: ICD-10-PCS | Mod: HCNC,CPTII,S$GLB, | Performed by: FAMILY MEDICINE

## 2020-06-23 PROCEDURE — 3075F PR MOST RECENT SYSTOLIC BLOOD PRESS GE 130-139MM HG: ICD-10-PCS | Mod: HCNC,CPTII,S$GLB, | Performed by: FAMILY MEDICINE

## 2020-06-23 PROCEDURE — 99999 PR PBB SHADOW E&M-EST. PATIENT-LVL III: CPT | Mod: PBBFAC,HCNC,, | Performed by: FAMILY MEDICINE

## 2020-06-23 PROCEDURE — 3078F DIAST BP <80 MM HG: CPT | Mod: HCNC,CPTII,S$GLB, | Performed by: FAMILY MEDICINE

## 2020-06-23 PROCEDURE — 1126F AMNT PAIN NOTED NONE PRSNT: CPT | Mod: HCNC,S$GLB,, | Performed by: FAMILY MEDICINE

## 2020-06-23 PROCEDURE — 99214 PR OFFICE/OUTPT VISIT, EST, LEVL IV, 30-39 MIN: ICD-10-PCS | Mod: HCNC,S$GLB,, | Performed by: FAMILY MEDICINE

## 2020-06-23 PROCEDURE — 3008F PR BODY MASS INDEX (BMI) DOCUMENTED: ICD-10-PCS | Mod: HCNC,CPTII,S$GLB, | Performed by: FAMILY MEDICINE

## 2020-06-23 PROCEDURE — 3008F BODY MASS INDEX DOCD: CPT | Mod: HCNC,CPTII,S$GLB, | Performed by: FAMILY MEDICINE

## 2020-06-23 PROCEDURE — 1159F MED LIST DOCD IN RCRD: CPT | Mod: HCNC,S$GLB,, | Performed by: FAMILY MEDICINE

## 2020-06-23 PROCEDURE — 1159F PR MEDICATION LIST DOCUMENTED IN MEDICAL RECORD: ICD-10-PCS | Mod: HCNC,S$GLB,, | Performed by: FAMILY MEDICINE

## 2020-06-23 PROCEDURE — 3078F PR MOST RECENT DIASTOLIC BLOOD PRESSURE < 80 MM HG: ICD-10-PCS | Mod: HCNC,CPTII,S$GLB, | Performed by: FAMILY MEDICINE

## 2020-06-23 RX ORDER — CLOPIDOGREL BISULFATE 75 MG/1
75 TABLET ORAL DAILY
Qty: 90 TABLET | Refills: 3 | Status: SHIPPED | OUTPATIENT
Start: 2020-06-23 | End: 2021-07-08

## 2020-06-23 RX ORDER — AMLODIPINE BESYLATE 10 MG/1
10 TABLET ORAL DAILY
Qty: 90 TABLET | Refills: 2 | Status: SHIPPED | OUTPATIENT
Start: 2020-06-23 | End: 2021-01-14 | Stop reason: SDUPTHER

## 2020-06-23 NOTE — PROGRESS NOTES
"Subjective:       Patient ID: Tracy Najera is a 69 y.o. female.    Chief Complaint: Follow-up    HPI     68 yo    Anemia  HTN  CVA - R sided weakness  Arthritis  HLD  Tortuous Aorta  Osteopenia  Vit D insuff     Past Surgical History:   Procedure Laterality Date    BREAST BIOPSY Left 2017    COLONOSCOPY N/A 4/16/2019    Procedure: COLONOSCOPY;  Surgeon: Ulisses Jacobsen MD;  Location: [facility];  Service: Endoscopy;  Laterality: N/A;    HYSTERECTOMY      partial     TONSILLECTOMY           Social History          Tobacco Use   Smoking Status Never Smoker   Smokeless Tobacco Never Used           Family History   Problem Relation Age of Onset    Hypertension Mother      Hypertension Sister      Stroke Brother      Cancer Neg Hx      Diabetes Neg Hx      Heart disease Neg Hx      Kidney disease Neg Hx          Wt Readings from Last 5 Encounters:   06/23/20 95.4 kg (210 lb 5.1 oz)   02/26/20 89 kg (196 lb 3.4 oz)   10/25/19 89.2 kg (196 lb 10.4 oz)   04/25/19 86.7 kg (191 lb 2.2 oz)   04/16/19 86.3 kg (190 lb 4.1 oz)     Doing well  No active complaints.    She has gained weight  She is trying to cut back.    Trying to exercise.  Walks "plenty"    Otherwise no active complaints  Tolerating medications without issue    Due for lab/ shingles shot    No significant change in her R. Sided post stroke weakness.  Is able to ambulate / function and does not appear to struggle with too much difficulty.      Pleasant as always.      Review of Systems   Constitutional: Negative for chills and fever.   HENT: Negative for trouble swallowing.    Eyes: Negative for visual disturbance.   Respiratory: Negative for shortness of breath.    Cardiovascular: Negative for chest pain.   Gastrointestinal: Negative for constipation.   Genitourinary: Negative for difficulty urinating.   Musculoskeletal: Negative for gait problem.   Skin: Negative for color change.   Neurological: Negative for dizziness.   Psychiatric/Behavioral: " Negative for confusion.       Objective:       Vitals:    06/23/20 1329   BP: 130/70   Pulse: 103   Temp: 98.5 °F (36.9 °C)       Physical Exam  Constitutional:       General: She is not in acute distress.     Appearance: Normal appearance. She is well-developed.   HENT:      Head: Normocephalic and atraumatic.   Eyes:      General: Lids are normal.      Conjunctiva/sclera: Conjunctivae normal.   Neck:      Musculoskeletal: Full passive range of motion without pain and normal range of motion.   Cardiovascular:      Rate and Rhythm: Normal rate and regular rhythm.      Heart sounds: Normal heart sounds.   Pulmonary:      Effort: Pulmonary effort is normal. No respiratory distress.      Breath sounds: Normal breath sounds.   Abdominal:      General: There is no distension.      Palpations: Abdomen is soft.   Musculoskeletal:         General: No deformity.   Lymphadenopathy:      Cervical: No cervical adenopathy.   Skin:     Coloration: Skin is not pale.   Neurological:      Mental Status: She is alert and oriented to person, place, and time. She is not disoriented.   Psychiatric:         Speech: Speech normal.         Behavior: Behavior normal.         Thought Content: Thought content normal.         Assessment:       1. Annual physical exam    2. Encounter for long-term current use of medication    3. History of CVA with residual deficit    4. Hypertension, unspecified type        Plan:   Annual physical exam  -     CBC auto differential; Future; Expected date: 06/23/2020  -     Comprehensive metabolic panel; Future; Expected date: 06/23/2020  -     Hemoglobin A1C; Future; Expected date: 06/23/2020  -     Lipid Panel; Future; Expected date: 06/23/2020  -     TSH; Future; Expected date: 06/23/2020  -     Vitamin D; Future; Expected date: 06/23/2020    Encounter for long-term current use of medication  -     CBC auto differential; Future; Expected date: 06/23/2020  -     Comprehensive metabolic panel; Future; Expected  date: 06/23/2020  -     Hemoglobin A1C; Future; Expected date: 06/23/2020  -     Lipid Panel; Future; Expected date: 06/23/2020  -     TSH; Future; Expected date: 06/23/2020  -     Vitamin D; Future; Expected date: 06/23/2020    History of CVA with residual deficit  -     clopidogreL (PLAVIX) 75 mg tablet; Take 1 tablet (75 mg total) by mouth once daily.  Dispense: 90 tablet; Refill: 3    Hypertension, unspecified type  -     amLODIPine (NORVASC) 10 MG tablet; Take 1 tablet (10 mg total) by mouth once daily.  Dispense: 90 tablet; Refill: 2    weight loss advised.    shingles

## 2020-06-24 ENCOUNTER — LAB VISIT (OUTPATIENT)
Dept: LAB | Facility: HOSPITAL | Age: 70
End: 2020-06-24
Attending: FAMILY MEDICINE
Payer: MEDICARE

## 2020-06-24 DIAGNOSIS — Z79.899 ENCOUNTER FOR LONG-TERM CURRENT USE OF MEDICATION: ICD-10-CM

## 2020-06-24 DIAGNOSIS — Z00.00 ANNUAL PHYSICAL EXAM: ICD-10-CM

## 2020-06-24 LAB
25(OH)D3+25(OH)D2 SERPL-MCNC: 22 NG/ML (ref 30–96)
ALBUMIN SERPL BCP-MCNC: 3.9 G/DL (ref 3.5–5.2)
ALP SERPL-CCNC: 102 U/L (ref 55–135)
ALT SERPL W/O P-5'-P-CCNC: 66 U/L (ref 10–44)
ANION GAP SERPL CALC-SCNC: 9 MMOL/L (ref 8–16)
AST SERPL-CCNC: 44 U/L (ref 10–40)
BASOPHILS # BLD AUTO: 0.04 K/UL (ref 0–0.2)
BASOPHILS NFR BLD: 0.4 % (ref 0–1.9)
BILIRUB SERPL-MCNC: 0.4 MG/DL (ref 0.1–1)
BUN SERPL-MCNC: 18 MG/DL (ref 8–23)
CALCIUM SERPL-MCNC: 9.7 MG/DL (ref 8.7–10.5)
CHLORIDE SERPL-SCNC: 109 MMOL/L (ref 95–110)
CHOLEST SERPL-MCNC: 168 MG/DL (ref 120–199)
CHOLEST/HDLC SERPL: 3.1 {RATIO} (ref 2–5)
CO2 SERPL-SCNC: 23 MMOL/L (ref 23–29)
CREAT SERPL-MCNC: 1.1 MG/DL (ref 0.5–1.4)
DIFFERENTIAL METHOD: ABNORMAL
EOSINOPHIL # BLD AUTO: 0.1 K/UL (ref 0–0.5)
EOSINOPHIL NFR BLD: 1.3 % (ref 0–8)
ERYTHROCYTE [DISTWIDTH] IN BLOOD BY AUTOMATED COUNT: 14.6 % (ref 11.5–14.5)
EST. GFR  (AFRICAN AMERICAN): 59.2 ML/MIN/1.73 M^2
EST. GFR  (NON AFRICAN AMERICAN): 51.3 ML/MIN/1.73 M^2
ESTIMATED AVG GLUCOSE: 103 MG/DL (ref 68–131)
GLUCOSE SERPL-MCNC: 80 MG/DL (ref 70–110)
HBA1C MFR BLD HPLC: 5.2 % (ref 4–5.6)
HCT VFR BLD AUTO: 36.6 % (ref 37–48.5)
HDLC SERPL-MCNC: 55 MG/DL (ref 40–75)
HDLC SERPL: 32.7 % (ref 20–50)
HGB BLD-MCNC: 11.1 G/DL (ref 12–16)
IMM GRANULOCYTES # BLD AUTO: 0.06 K/UL (ref 0–0.04)
IMM GRANULOCYTES NFR BLD AUTO: 0.6 % (ref 0–0.5)
LDLC SERPL CALC-MCNC: 97.2 MG/DL (ref 63–159)
LYMPHOCYTES # BLD AUTO: 1.4 K/UL (ref 1–4.8)
LYMPHOCYTES NFR BLD: 13.1 % (ref 18–48)
MCH RBC QN AUTO: 28.5 PG (ref 27–31)
MCHC RBC AUTO-ENTMCNC: 30.3 G/DL (ref 32–36)
MCV RBC AUTO: 94 FL (ref 82–98)
MONOCYTES # BLD AUTO: 0.9 K/UL (ref 0.3–1)
MONOCYTES NFR BLD: 8.6 % (ref 4–15)
NEUTROPHILS # BLD AUTO: 8.3 K/UL (ref 1.8–7.7)
NEUTROPHILS NFR BLD: 76 % (ref 38–73)
NONHDLC SERPL-MCNC: 113 MG/DL
NRBC BLD-RTO: 0 /100 WBC
PLATELET # BLD AUTO: 267 K/UL (ref 150–350)
PMV BLD AUTO: 12 FL (ref 9.2–12.9)
POTASSIUM SERPL-SCNC: 4.4 MMOL/L (ref 3.5–5.1)
PROT SERPL-MCNC: 7.9 G/DL (ref 6–8.4)
RBC # BLD AUTO: 3.9 M/UL (ref 4–5.4)
SODIUM SERPL-SCNC: 141 MMOL/L (ref 136–145)
TRIGL SERPL-MCNC: 79 MG/DL (ref 30–150)
TSH SERPL DL<=0.005 MIU/L-ACNC: 1.55 UIU/ML (ref 0.4–4)
WBC # BLD AUTO: 10.87 K/UL (ref 3.9–12.7)

## 2020-06-24 PROCEDURE — 80061 LIPID PANEL: CPT | Mod: HCNC

## 2020-06-24 PROCEDURE — 85025 COMPLETE CBC W/AUTO DIFF WBC: CPT | Mod: HCNC

## 2020-06-24 PROCEDURE — 82306 VITAMIN D 25 HYDROXY: CPT | Mod: HCNC

## 2020-06-24 PROCEDURE — 84443 ASSAY THYROID STIM HORMONE: CPT | Mod: HCNC

## 2020-06-24 PROCEDURE — 83036 HEMOGLOBIN GLYCOSYLATED A1C: CPT | Mod: HCNC

## 2020-06-24 PROCEDURE — 36415 COLL VENOUS BLD VENIPUNCTURE: CPT | Mod: HCNC

## 2020-06-24 PROCEDURE — 80053 COMPREHEN METABOLIC PANEL: CPT | Mod: HCNC

## 2020-06-24 NOTE — PROGRESS NOTES
Patient, Tracy Najera (MRN #[id number]), presented with a recorded BMI of 36.1 kg/m^2 and a documented comorbidity(s):  - Hypertension  to which the severe obesity is a contributing factor. This is consistent with the definition of severe obesity (BMI 35.0-39.9) with comorbidity (ICD-10 E66.01, Z68.35). The patient's severe obesity was monitored, evaluated, addressed and/or treated. This addendum to the medical record is made on 06/24/2020.

## 2020-07-15 ENCOUNTER — OFFICE VISIT (OUTPATIENT)
Dept: OPHTHALMOLOGY | Facility: CLINIC | Age: 70
End: 2020-07-15
Payer: MEDICARE

## 2020-07-15 DIAGNOSIS — H25.13 CATARACT, NUCLEAR SCLEROTIC SENILE, BILATERAL: ICD-10-CM

## 2020-07-15 DIAGNOSIS — H52.4 BILATERAL PRESBYOPIA: ICD-10-CM

## 2020-07-15 DIAGNOSIS — R79.89 ELEVATED LFTS: Primary | ICD-10-CM

## 2020-07-15 DIAGNOSIS — I10 ESSENTIAL HYPERTENSION: Primary | ICD-10-CM

## 2020-07-15 PROCEDURE — 99499 RISK ADDL DX/OHS AUDIT: ICD-10-PCS | Mod: HCNC,S$GLB,, | Performed by: OPTOMETRIST

## 2020-07-15 PROCEDURE — 92015 PR REFRACTION: ICD-10-PCS | Mod: HCNC,S$GLB,, | Performed by: OPTOMETRIST

## 2020-07-15 PROCEDURE — 99999 PR PBB SHADOW E&M-EST. PATIENT-LVL II: CPT | Mod: PBBFAC,HCNC,, | Performed by: OPTOMETRIST

## 2020-07-15 PROCEDURE — 92004 PR EYE EXAM, NEW PATIENT,COMPREHESV: ICD-10-PCS | Mod: HCNC,S$GLB,, | Performed by: OPTOMETRIST

## 2020-07-15 PROCEDURE — 92004 COMPRE OPH EXAM NEW PT 1/>: CPT | Mod: HCNC,S$GLB,, | Performed by: OPTOMETRIST

## 2020-07-15 PROCEDURE — 99499 UNLISTED E&M SERVICE: CPT | Mod: HCNC,S$GLB,, | Performed by: OPTOMETRIST

## 2020-07-15 PROCEDURE — 92015 DETERMINE REFRACTIVE STATE: CPT | Mod: HCNC,S$GLB,, | Performed by: OPTOMETRIST

## 2020-07-15 PROCEDURE — 99999 PR PBB SHADOW E&M-EST. PATIENT-LVL II: ICD-10-PCS | Mod: PBBFAC,HCNC,, | Performed by: OPTOMETRIST

## 2020-07-15 NOTE — PROGRESS NOTES
HPI     No visual complaints.  Time for exam.  Last eye exam 07/29/2016 [facility].  Update glasses RX.  New patient to [facility].    Last edited by Bryanna Langford on 7/15/2020 10:02 AM. (History)            Assessment /Plan     For exam results, see Encounter Report.    Essential hypertension    Cataract, nuclear sclerotic senile, bilateral    Bilateral presbyopia      No HTN Retinopathy    Mild cataracts OU, not surgical.    Dispense Final Rx for glasses.  RTC 1 year  Discussed above and answered questions.

## 2020-07-27 ENCOUNTER — OFFICE VISIT (OUTPATIENT)
Dept: INTERNAL MEDICINE | Facility: CLINIC | Age: 70
End: 2020-07-27
Payer: MEDICARE

## 2020-07-27 VITALS
OXYGEN SATURATION: 99 % | DIASTOLIC BLOOD PRESSURE: 86 MMHG | WEIGHT: 209.88 LBS | HEART RATE: 81 BPM | SYSTOLIC BLOOD PRESSURE: 130 MMHG | HEIGHT: 62 IN | BODY MASS INDEX: 38.62 KG/M2

## 2020-07-27 DIAGNOSIS — I69.30 HISTORY OF CVA WITH RESIDUAL DEFICIT: ICD-10-CM

## 2020-07-27 DIAGNOSIS — E78.5 HYPERLIPIDEMIA, UNSPECIFIED HYPERLIPIDEMIA TYPE: ICD-10-CM

## 2020-07-27 DIAGNOSIS — I10 ESSENTIAL HYPERTENSION: ICD-10-CM

## 2020-07-27 DIAGNOSIS — R94.120 ABNORMAL HEARING SCREEN: ICD-10-CM

## 2020-07-27 DIAGNOSIS — M85.80 OSTEOPENIA, UNSPECIFIED LOCATION: ICD-10-CM

## 2020-07-27 DIAGNOSIS — I77.1 TORTUOUS AORTA: ICD-10-CM

## 2020-07-27 DIAGNOSIS — D64.9 ANEMIA, UNSPECIFIED TYPE: ICD-10-CM

## 2020-07-27 DIAGNOSIS — E66.01 SEVERE OBESITY (BMI 35.0-39.9) WITH COMORBIDITY: ICD-10-CM

## 2020-07-27 DIAGNOSIS — Z00.00 ENCOUNTER FOR PREVENTIVE HEALTH EXAMINATION: Primary | ICD-10-CM

## 2020-07-27 PROCEDURE — 99499 RISK ADDL DX/OHS AUDIT: ICD-10-PCS | Mod: HCNC,S$GLB,, | Performed by: NURSE PRACTITIONER

## 2020-07-27 PROCEDURE — 99999 PR PBB SHADOW E&M-EST. PATIENT-LVL IV: ICD-10-PCS | Mod: PBBFAC,HCNC,, | Performed by: NURSE PRACTITIONER

## 2020-07-27 PROCEDURE — G0439 PR MEDICARE ANNUAL WELLNESS SUBSEQUENT VISIT: ICD-10-PCS | Mod: HCNC,S$GLB,, | Performed by: NURSE PRACTITIONER

## 2020-07-27 PROCEDURE — 3075F PR MOST RECENT SYSTOLIC BLOOD PRESS GE 130-139MM HG: ICD-10-PCS | Mod: HCNC,CPTII,S$GLB, | Performed by: NURSE PRACTITIONER

## 2020-07-27 PROCEDURE — 3075F SYST BP GE 130 - 139MM HG: CPT | Mod: HCNC,CPTII,S$GLB, | Performed by: NURSE PRACTITIONER

## 2020-07-27 PROCEDURE — 99499 UNLISTED E&M SERVICE: CPT | Mod: HCNC,S$GLB,, | Performed by: NURSE PRACTITIONER

## 2020-07-27 PROCEDURE — G0439 PPPS, SUBSEQ VISIT: HCPCS | Mod: HCNC,S$GLB,, | Performed by: NURSE PRACTITIONER

## 2020-07-27 PROCEDURE — 3079F DIAST BP 80-89 MM HG: CPT | Mod: HCNC,CPTII,S$GLB, | Performed by: NURSE PRACTITIONER

## 2020-07-27 PROCEDURE — 99999 PR PBB SHADOW E&M-EST. PATIENT-LVL IV: CPT | Mod: PBBFAC,HCNC,, | Performed by: NURSE PRACTITIONER

## 2020-07-27 PROCEDURE — 3079F PR MOST RECENT DIASTOLIC BLOOD PRESSURE 80-89 MM HG: ICD-10-PCS | Mod: HCNC,CPTII,S$GLB, | Performed by: NURSE PRACTITIONER

## 2020-07-27 NOTE — PROGRESS NOTES
"  Tracy Najera presented for a  Medicare AWV and comprehensive Health Risk Assessment today. The following components were reviewed and updated:    · Medical history  · Family History  · Social history  · Allergies and Current Medications  · Health Risk Assessment  · Health Maintenance  · Care Team     ** See Completed Assessments for Annual Wellness Visit within the encounter summary.**         The following assessments were completed:  · Living Situation  · CAGE  · Depression Screening  · Timed Get Up and Go  · Whisper Test  · Cognitive Function Screening  · Nutrition Screening  · ADL Screening  · PAQ Screening        Vitals:    07/27/20 1447 07/27/20 1508   BP: 130/86    BP Location: Right arm    Patient Position: Sitting    BP Method: Medium (Manual)    Pulse:  81   SpO2:  99%   Weight: 95.2 kg (209 lb 14.1 oz)    Height: 5' 2.21" (1.58 m)      Body mass index is 38.13 kg/m².  Physical Exam  Vitals signs and nursing note reviewed.   Constitutional:       Appearance: She is well-developed.   HENT:      Head: Normocephalic.   Cardiovascular:      Rate and Rhythm: Normal rate and regular rhythm.      Heart sounds: Normal heart sounds. No murmur.   Pulmonary:      Effort: Pulmonary effort is normal. No respiratory distress.      Breath sounds: Normal breath sounds.   Abdominal:      Palpations: Abdomen is soft. There is no mass.      Tenderness: There is no abdominal tenderness.   Musculoskeletal: Normal range of motion.   Skin:     General: Skin is warm and dry.   Neurological:      Mental Status: She is alert and oriented to person, place, and time.      Motor: No abnormal muscle tone.   Psychiatric:         Speech: Speech normal.         Behavior: Behavior normal.               Diagnoses and health risks identified today and associated recommendations/orders:    1. Encounter for preventive health examination    2. Essential hypertension  Stable and controlled. Continue current treatment plan as previously " prescribed with your PCP.    3. Hyperlipidemia, unspecified hyperlipidemia type  Stable and controlled. Continue current treatment plan as previously prescribed with your PCP.     4. Tortuous aorta  cxr 6/2018  Continue current treatment plan as previously prescribed with your PCP.     5. Severe obesity (BMI 35.0-39.9) with comorbidity  Continue current treatment plan as previously prescribed with your PCP.     6. History of CVA with residual deficit  Continue current treatment plan as previously prescribed with your PCP.     7. Anemia, unspecified type  Continue current treatment plan as previously prescribed with your PCP.     8. Osteopenia, unspecified location  DEXA 11/2018  Continue current treatment plan as previously prescribed with your PCP.     9. Abnormal hearing screen  Denies difficulty hearing.   Abnormal whisper test.   Declines audiology referral at this time.   Advised to follow up with PCP for further evaluation and recommendations. Patient expressed understanding.         Provided Tracy with a 5-10 year written screening schedule and personal prevention plan. Recommendations were developed using the USPSTF age appropriate recommendations. Education, counseling, and referrals were provided as needed. After Visit Summary printed and given to patient which includes a list of additional screenings\tests needed.    Follow up in about 1 year (around 7/27/2021) for AWV.    Sherine Singer NP  I offered to discuss end of life issues, including information on how to make advance directives that the patient could use to name someone who would make medical decisions on their behalf if they became too ill to make themselves.    ___Patient declined  _X_Patient is interested, I provided paper work and offered to discuss.

## 2020-07-27 NOTE — PATIENT INSTRUCTIONS
Counseling and Referral of Other Preventative  (Italic type indicates deductible and co-insurance are waived)    Patient Name: Tracy Najera  Today's Date: 7/27/2020    Health Maintenance       Date Due Completion Date    Influenza Vaccine (1) 09/01/2020 9/23/2019    Mammogram 02/26/2021 2/26/2020    Override on 8/3/2015: Done ([facility] at [facility])    Override on 10/6/2014: Done (Done at [facility]--exact date unknown)    Override on 9/11/2013: (N/S)    Override on 8/19/2012: (N/S)    Lipid Panel 06/24/2021 6/24/2020    High Dose Statin 07/15/2021 7/15/2020    DEXA SCAN 11/14/2021 11/14/2018    Override on 11/2/2015: Done (Exact date unknown--done at [facility])    Colorectal Cancer Screening 04/16/2024 4/16/2019    TETANUS VACCINE 10/29/2025 10/29/2015        No orders of the defined types were placed in this encounter.    The following information is provided to all patients.  This information is to help you find resources for any of the problems found today that may be affecting your health:                Living healthy guide: www.[facility].[place].gov      Understanding Diabetes: www.diabetes.org      Eating healthy: www.cdc.gov/healthyweight      CDC home safety checklist: www.cdc.gov/steadi/patient.html      Agency on Aging: www.goea.[place].[facility]      Alcoholics anonymous (AA): www.aa.org      Physical Activity: www.elder.nih.gov/rx7zyvi      Tobacco use: www.quitwithusla.org

## 2020-08-19 ENCOUNTER — PATIENT OUTREACH (OUTPATIENT)
Dept: OTHER | Facility: OTHER | Age: 70
End: 2020-08-19

## 2020-08-19 NOTE — PROGRESS NOTES
Digital Medicine: Health  Follow-Up    Patient returned my call for follow up.  She states she has been feeling well and denies any symptoms of hyper or hypotension.    The history is provided by the patient.             Reason for review: Blood pressure at goal        Topics Covered on Call: physical activity          Diet-Not assessed          Physical Activity-Change  7 day(s) a week.     She added calisthenics, walking to Her physical activity routine.      Medication Adherence-Medication adherence was assessed.      Substance, Sleep, Stress-Not assessed      Continue current diet/physical activity routine.       Addressed any questions or concerns and patient has my contact information if needed prior to next outreach.   Explained the importance of self-monitoring and medication adherence. Encouraged the patient to communicate with their health  for lifestyle modifications to help improve or maintain a healthy lifestyle.            There are no preventive care reminders to display for this patient.    Last 5 Patient Entered Readings                                      Current 30 Day Average: 125/76     Recent Readings 8/18/2020 8/18/2020 8/13/2020 8/8/2020 8/5/2020    SBP (mmHg) 126 119 120 129 103    DBP (mmHg) 81 101 75 78 55    Pulse 78 81 79 81 88

## 2020-09-16 NOTE — MR AVS SNAPSHOT
[facility]  [address] [place] [place] [address]  Phone: [phone]  Fax: [phone]                  Tracy Najera   2017 1:00 PM   Office Visit    Description:  Female : 1950   Provider:  MALACHI Cox   [facility]           Reason for Visit     HRA           Diagnoses this Visit        Comments    Encounter for preventive health examination    -  Primary            To Do List           Future Appointments        Provider Department Dept Phone    2017 1:00 PM Ryan Hopper MD [facility] [phone]      Goals (5 Years of Data)     None      [facility] On [facility]     [facility] On Call Nurse Care Line -  Assistance  Unless otherwise directed by your provider, please contact [facility], our nurse care line that is available for  assistance.     Registered nurses in the [facility] On Call Center provide: appointment scheduling, clinical advisement, health education, and other advisory services.  Call: [phone] (toll free)               Medications           Message regarding Medications     Verify the changes and/or additions to your medication regime listed below are the same as discussed with your clinician today.  If any of these changes or additions are incorrect, please notify your healthcare provider.             Verify that the below list of medications is an accurate representation of the medications you are currently taking.  If none reported, the list may be blank. If incorrect, please contact your healthcare provider. Carry this list with you in case of emergency.           Current Medications     atorvastatin (LIPITOR) 20 MG tablet Take 1 tablet (20 mg total) by mouth once daily.    clopidogrel (PLAVIX) 75 mg tablet Take 1 tablet (75 mg total) by mouth once daily.    losartan (COZAAR) 100 MG tablet Take 1 tablet (100 mg total) by mouth once daily.    hydrochlorothiazide  Lab Results   Component Value Date    HGBA1C 6 2 06/16/2020   A chronic asymptomatic fair control continue metformin 500 twice a day low carb diet discussed the patient discussed the continue monitor blood sugar patient already on statin and she is already on Arb  A no diabetic retinopathy "(HYDRODIURIL) 12.5 MG Tab Take 1 tablet (12.5 mg total) by mouth once daily.           Clinical Reference Information           Your Vitals Were     BP Pulse Temp Height Weight BMI    136/80 66 98 °F (36.7 °C) (Tympanic) 5' 1.5" (1.562 m) 82.2 kg (181 lb 3.5 oz) 33.69 kg/m2      Blood Pressure          Most Recent Value    BP  136/80      Allergies as of 4/5/2017     No Known Allergies      Immunizations Administered on Date of Encounter - 4/5/2017     Name Date Dose VIS Date Route    Pneumococcal Polysaccharide - 23 Valent 4/5/2017 0.5 mL 4/24/2015 Intramuscular      Instructions      Counseling and Referral of Other Preventative  (Italic type indicates deductible and co-insurance are waived)    Patient Name: Tracy Najera  Today's Date: 4/5/2017      SERVICE LIMITATIONS RECOMMENDATION    Vaccines    · Pneumococcal (once after 65)    · Influenza (annually)    · Hepatitis B (if medium/high risk)    · Prevnar 13      Hepatitis B medium/high risk factors:       - End-stage renal disease       - Hemophiliacs who received Factor VII or         IX concentrates       - Clients of institutions for the mentally             retarded       - Persons who live in the same house as          a HepB carrier       - Homosexual men       - Illicit injectable drug abusers     Pneumococcal: Scheduled - see appointments     Influenza: Done, repeat in one year     Hepatitis B: Done, no repeat necessary     Prevnar 13: Done, no repeat necessary    Mammogram (biennial age 50-74)  Annually (age 40 or over)  Done this year, repeat every year    Pap (up to age 70 and after 70 if unknown history or abnormal study last 10 years)    N/A     The USPSTF recommends against screening for cervical cancer in women who have had a hysterectomy with removal of the cervix and who do not have a history of a high-grade precancerous lesion (cervical intraepithelial neoplasia [ARINA] grade 2 or 3) or cervical cancer.     Colorectal cancer screening (to age " 75)    · Fecal occult blood test (annual)  · Flexible sigmoidoscopy (5y)  · Screening colonoscopy (10y)  · Barium enema   Last done 2013, recommend to repeat every 5  years    Diabetes self-management training (no USPSTF recommendations)  Requires referral by treating physician for patient with diabetes or renal disease. 10 hours of initial DSMT sessions of no less than 30 minutes each in a continuous 12-month period. 2 hours of follow-up DSMT in subsequent years.  N/A    Bone mass measurements (age 65 & older, biennial)  Requires diagnosis related to osteoporosis or estrogen deficiency. Biennial benefit unless patient has history of long-term glucocorticoid  Last done 2015, recommend to repeat every 3  years    Glaucoma screening (no USPSTF recommendation)  Diabetes mellitus, family history   , age 50 or over    American, age 65 or over  N/A    Medical nutrition therapy for diabetes or renal disease (no recommended schedule)  Requires referral by treating physician for patient with diabetes or renal disease or kidney transplant within the past 3 years.  Can be provided in same year as diabetes self-management training (DSMT), and CMS recommends medical nutrition therapy take place after DSMT. Up to 3 hours for initial year and 2 hours in subsequent years.  N/A    Cardiovascular screening blood tests (every 5 years)  · Fasting lipid panel  Order as a panel if possible  Done this year, repeat every year    Diabetes screening tests (at least every 3 years, Medicare covers annually or at 6-month intervals for prediabetic patients)  · Fasting blood sugar (FBS) or glucose tolerance test (GTT)  Patient must be diagnosed with one of the following:       - Hypertension       - Dyslipidemia       - Obesity (BMI 30kg/m2)       - Previous elevated impaired FBS or GTT       ... or any two of the following:       - Overweight (BMI 25 but <30)       - Family history of diabetes       - Age 65 or older        - History of gestational diabetes or birth of baby weighing more than 9 pounds  N/A    Abdominal aortic aneurysm screening (once)  · Sonogram   Limited to patients who meet one of the following criteria:       - Men who are 65-75 years old and have smoked more than 100 cigarette in their lifetime       - Anyone with a family history of abdominal aortic aneurysm       - Anyone recommended for screening by the USPSTF  N/A    HIV screening (annually for increased risk patients)  · HIV-1 and HIV-2 by EIA, or YADI, rapid antibody test or oral mucosa transudate  Patients must be at increased risk for HIV infection per USPSTF guidelines or pregnant. Tests covered annually for patient at increased risk or as requested by the patient. Pregnant patients may receive up to 3 tests during pregnancy.  Risks discussed, screening is not recommended    Smoking cessation counseling (up to 8 sessions per year)  Patients must be asymptomatic of tobacco-related conditions to receive as a preventative service.  Non-smoker    Subsequent annual wellness visit  At least 12 months since last AWV  Return in one year     The following information is provided to all patients.  This information is to help you find resources for any of the problems found today that may be affecting your health:                Living healthy guide: www.[facility].[place].gov      Understanding Diabetes: www.diabetes.org      Eating healthy: www.cdc.gov/healthyweight      CDC home safety checklist: www.cdc.gov/steadi/patient.html      Agency on Aging: www.goea.[place].gov      Alcoholics anonymous (AA): www.aa.org      Physical Activity: www.elder.nih.gov/nk8msys      Tobacco use: www.quitwithusla.org          Language Assistance Services     ATTENTION: Language assistance services are available, free of charge. Please call [phone].      ATENCIÓN: Si habla español, tiene a smith disposición servicios gratuitos de asistencia lingüística. Llame al  [phone].     [facility] Ý: N?u b?n nói Ti?ng Vi?t, có các d?ch v? h? tr? ngôn ng? mi?n phí dành cho b?n. G?i s? [phone].         [facility] complies with applicable Federal civil rights laws and does not discriminate on the basis of race, color, national origin, age, disability, or sex.

## 2020-10-12 ENCOUNTER — PATIENT OUTREACH (OUTPATIENT)
Dept: OTHER | Facility: OTHER | Age: 70
End: 2020-10-12

## 2020-10-12 NOTE — PROGRESS NOTES
Digital Medicine: Clinician Follow-Up    Patient called to inquire if BP readings are being received.       Follow-up reason(s): addressing patient questions/concerns and routine follow up.     Hypertension    Patient's blood pressure is stable.   Patient is not experiencing signs/symptoms of hypotension.  Patient is not experiencing signs/symptoms of hypertension.      Additional Follow-up details: Patient's readings are transmitting. She has questions about PCP follow up. Advised she message Dr. Oakes's staff to set up a 6 month appointment if necessary.         Last 5 Patient Entered Readings                                      Current 30 Day Average: 130/75     Recent Readings 10/12/2020 10/9/2020 10/8/2020 10/6/2020 10/6/2020    SBP (mmHg) 142 110 125 119 123    DBP (mmHg) 77 68 67 66 60    Pulse 81 76 73 73 73                ASSESSMENT(S)  Patients BP average is 130/75 mmHg, which is at goal. Patient's BP goal is less than or equal to 130/80.    Hypertension Plan  Continue current therapy.  Continue current diet/physical activity routine.       Addressed patient questions and patient has my contact information if needed prior to next outreach. Patient verbalizes understanding.                 Hypertension Medications             amLODIPine (NORVASC) 10 MG tablet Take 1 tablet (10 mg total) by mouth once daily.    losartan (COZAAR) 100 MG tablet TAKE 1 TABLET BY MOUTH EVERY DAY

## 2020-10-15 RX ORDER — ATORVASTATIN CALCIUM 40 MG/1
TABLET, FILM COATED ORAL
Qty: 90 TABLET | Refills: 2 | Status: SHIPPED | OUTPATIENT
Start: 2020-10-15 | End: 2021-07-07

## 2020-10-15 NOTE — TELEPHONE ENCOUNTER
No new care gaps identified.  Powered by [employer]. Reference number: [id number]. 10/15/2020 10:51:48 AM   PHILT

## 2020-10-15 NOTE — PROGRESS NOTES
Refill Authorization Note   Tracy Najera is requesting a refill authorization.  Brief assessment and rationale for refill: Approve  Medication Therapy Plan: CDMR.       Medication reconciliation completed: No    Comments:   Orders Placed This Encounter    atorvastatin (LIPITOR) 40 MG tablet      Requested Prescriptions   Signed Prescriptions Disp Refills    atorvastatin (LIPITOR) 40 MG tablet 90 tablet 2     Sig: TAKE 1 TABLET BY MOUTH EVERY DAY       Cardiovascular:  Antilipid - Statins Passed - 10/15/2020 10:51 AM        Passed - Patient is at least 18 years old        Passed - Office Visit within last 12 months or future 90 days.     Recent Outpatient Visits            2 months ago Encounter for preventive health examination    [facility] Sherine Singer, JACEY    3 months ago Essential hypertension    [facility] Orlando Loera, TATUM    3 months ago Annual physical exam    [facility] Mike Oakes MD    11 months ago Hypertension, unspecified type    [facility] Mike Oakes MD    1 year ago Hypertension, unspecified type    [facility] Mike Oakes MD          Future Appointments              In 2 months Mike Oakes MD [facility]                Passed - ALT is 94 or below and within 360 days     ALT   Date Value Ref Range Status   06/24/2020 66 (H) 10 - 44 U/L Final   04/25/2019 12 10 - 44 U/L Final   03/16/2019 8 (L) 10 - 44 U/L Final              Passed - AST is 54 or below and within 360 days     AST   Date Value Ref Range Status   06/24/2020 44 (H) 10 - 40 U/L Final   04/25/2019 15 10 - 40 U/L Final   03/16/2019 13 10 - 40 U/L Final              Passed - Total Cholesterol within 360 days     Cholesterol   Date Value Ref Range Status   06/24/2020 168 120 - 199 mg/dL Final     Comment:     The National Cholesterol Education Program (NCEP) has set the  following guidelines (reference  ranges) for Cholesterol:  Optimal.....................<200 mg/dL  Borderline High.............200-239 mg/dL  High........................> or = 240 mg/dL     04/25/2019 170 120 - 199 mg/dL Final     Comment:     The National Cholesterol Education Program (NCEP) has set the  following guidelines (reference ranges) for Cholesterol:  Optimal.....................<200 mg/dL  Borderline High.............200-239 mg/dL  High........................> or = 240 mg/dL     11/13/2018 181 120 - 199 mg/dL Final     Comment:     The National Cholesterol Education Program (NCEP) has set the  following guidelines (reference ranges) for Cholesterol:  Optimal.....................<200 mg/dL  Borderline High.............200-239 mg/dL  High........................> or = 240 mg/dL                Passed - LDL within 360 days     LDL Cholesterol   Date Value Ref Range Status   06/24/2020 97.2 63.0 - 159.0 mg/dL Final     Comment:     The National Cholesterol Education Program (NCEP) has set the  following guidelines (reference values) for LDL Cholesterol:  Optimal.......................<130 mg/dL  Borderline High...............130-159 mg/dL  High..........................160-189 mg/dL  Very High.....................>190 mg/dL              Passed - HDL within 360 days     HDL   Date Value Ref Range Status   06/24/2020 55 40 - 75 mg/dL Final     Comment:     The National Cholesterol Education Program (NCEP) has set the  following guidelines (reference values) for HDL Cholesterol:  Low...............<40 mg/dL  Optimal...........>60 mg/dL              Passed - Triglycerides within 360 days     Triglycerides   Date Value Ref Range Status   06/24/2020 79 30 - 150 mg/dL Final     Comment:     The National Cholesterol Education Program (NCEP) has set the  following guidelines (reference values) for triglycerides:  Normal......................<150 mg/dL  Borderline High.............150-199 mg/dL  High........................200-499 mg/dL      04/25/2019 77 30 - 150 mg/dL Final     Comment:     The National Cholesterol Education Program (NCEP) has set the  following guidelines (reference values) for triglycerides:  Normal......................<150 mg/dL  Borderline High.............150-199 mg/dL  High........................200-499 mg/dL     11/13/2018 103 30 - 150 mg/dL Final     Comment:     The National Cholesterol Education Program (NCEP) has set the  following guidelines (reference values) for triglycerides:  Normal......................<150 mg/dL  Borderline High.............150-199 mg/dL  High........................200-499 mg/dL                    Appointments  past 12m or future 3m with PCP    Date Provider   Last Visit   6/23/2020 Mike Oakes MD   Next Visit   12/24/2020 Mike Oakes MD   ED visits in past 90 days: 0     Note composed:1:51 PM 10/15/2020

## 2020-10-19 ENCOUNTER — PATIENT OUTREACH (OUTPATIENT)
Dept: OTHER | Facility: OTHER | Age: 70
End: 2020-10-19

## 2020-10-19 NOTE — PROGRESS NOTES
Digital Medicine: Health  Follow-Up    The history is provided by the patient.             Reason for review: Blood pressure at goal        Topics Covered on Call: physical activity and Diet    Additional Follow-up details: Patient says she is feeling well and happy with her BP readings.            Diet-no change to diet    No change to diet.        Physical Activity-Change      She exercises for 40 minutes per day 4 day(s) a week.     She added increased walking to Her physical activity routine.        Additional physical activity details: Patient says she has been walking more lately.      Medication Adherence-Medication adherence was assessed.      Substance, Sleep, Stress-Not assessed      Continue current diet/physical activity routine.       Addressed patient questions and patient has my contact information if needed prior to next outreach.   Explained the importance of self-monitoring and medication adherence. Encouraged the patient to communicate with their health  for lifestyle modifications to help improve or maintain a healthy lifestyle.               There are no preventive care reminders to display for this patient.      Last 5 Patient Entered Readings                                      Current 30 Day Average: 131/73     Recent Readings 10/19/2020 10/15/2020 10/12/2020 10/9/2020 10/8/2020    SBP (mmHg) 125 136 142 110 125    DBP (mmHg) 74 73 77 68 67    Pulse 76 75 81 76 73

## 2020-10-30 ENCOUNTER — IMMUNIZATION (OUTPATIENT)
Dept: PHARMACY | Facility: CLINIC | Age: 70
End: 2020-10-30
Payer: MEDICARE

## 2020-11-16 RX ORDER — LOSARTAN POTASSIUM 100 MG/1
100 TABLET ORAL DAILY
Qty: 90 TABLET | Refills: 2 | Status: SHIPPED | OUTPATIENT
Start: 2020-11-16 | End: 2021-07-15 | Stop reason: SDUPTHER

## 2020-11-16 NOTE — TELEPHONE ENCOUNTER
No new care gaps identified.  Powered by [employer]. Reference number: [id number]. 11/16/2020 12:28:37 AM   CST

## 2020-11-16 NOTE — PROGRESS NOTES
Refill Authorization Note   Tracy Najera is requesting a refill authorization.  Brief assessment and rationale for refill: Approve     Medication Therapy Plan: [facility]    Medication reconciliation completed: No   Comments:   Orders Placed This Encounter    losartan (COZAAR) 100 MG tablet      Requested Prescriptions   Signed Prescriptions Disp Refills    losartan (COZAAR) 100 MG tablet 90 tablet 2     Sig: Take 1 tablet (100 mg total) by mouth once daily.       Cardiovascular:  Angiotensin Receptor Blockers Passed - 11/16/2020 12:28 AM        Passed - Patient is at least 18 years old        Passed - Last BP in normal range within 360 days.     BP Readings from Last 3 Encounters:   07/27/20 130/86   06/23/20 130/70   10/25/19 125/85              Passed - Office visit in past 12 months or future 90 days     Recent Outpatient Visits            3 months ago Encounter for preventive health examination    [facility] Sherine Singer NP    4 months ago Essential hypertension    [facility] Orlando Loera OD    4 months ago Annual physical exam    [facility] Mike Oakes MD    1 year ago Hypertension, unspecified type    [facility] Mike Oakes MD    1 year ago Hypertension, unspecified type    [facility] Mike Oakes MD          Future Appointments              In 1 month Mike Oakes MD [facility], [facility]                Passed - Cr is 1.4 or below and within 360 days     Creatinine   Date Value Ref Range Status   06/24/2020 1.1 0.5 - 1.4 mg/dL Final   04/25/2019 1.0 0.5 - 1.4 mg/dL Final   03/16/2019 1.0 0.5 - 1.4 mg/dL Final              Passed - K in normal range and within 360 days     Potassium   Date Value Ref Range Status   06/24/2020 4.4 3.5 - 5.1 mmol/L Final   04/25/2019 3.8 3.5 - 5.1 mmol/L Final   03/16/2019 4.0 3.5 - 5.1 mmol/L Final              Passed - eGFR within 360 days     eGFR if non     Date Value Ref Range Status   06/24/2020 51.3 (A) >60 mL/min/1.73 m^2 Final     Comment:     Calculation used to obtain the estimated glomerular filtration  rate (eGFR) is the CKD-EPI equation.      04/25/2019 58.0 (A) >60 mL/min/1.73 m^2 Final     Comment:     Calculation used to obtain the estimated glomerular filtration  rate (eGFR) is the CKD-EPI equation.      03/16/2019 58 (A) >60 mL/min/1.73 m^2 Final     Comment:     Calculation used to obtain the estimated glomerular filtration  rate (eGFR) is the CKD-EPI equation.        eGFR if    Date Value Ref Range Status   06/24/2020 59.2 (A) >60 mL/min/1.73 m^2 Final   04/25/2019 >60.0 >60 mL/min/1.73 m^2 Final   03/16/2019 >60 >60 mL/min/1.73 m^2 Final                  Appointments  past 12m or future 3m with PCP    Date Provider   Last Visit   6/23/2020 Mike Oakes MD   Next Visit   12/24/2020 Mike Oakes MD   ED visits in past 90 days: 0     Note composed:2:56 PM 11/16/2020

## 2020-12-15 ENCOUNTER — PATIENT OUTREACH (OUTPATIENT)
Dept: OTHER | Facility: OTHER | Age: 70
End: 2020-12-15

## 2020-12-15 NOTE — PROGRESS NOTES
Digital Medicine: Health  Follow-Up    The history is provided by the patient.             Reason for review: Blood pressure not at goal        Topics Covered on Call: physical activity and Diet    Additional Follow-up details: Patient denies any symptoms of hypertension and states she is feeling well.  She says she has not charged her cuff in quite some time, but will do so this week and take another reading.            Diet-no change to diet    No change to diet.  Patient reports eating or drinking the following: Patient reports not changes to dietary habits. I reminded her of AHA guidelines for sodium intake.    Intervention(s): DASH diet/sodium reduction education      Physical Activity-no change to routine  No change to exercise routine.       Intervention(s): provided exercise ideas         Additional physical activity details: Patient says she gets some exercise walking. I encourage her to try and increase physical activity to help lower BP.      Medication Adherence-Medication adherence was assessed.        Substance, Sleep, Stress-No change  stress-assessed  Details:  Intervention(s):    Sleep-assessed  Details:Patient reports no issues with sleep  Intervention(s):    Alcohol -not assessed  Details:  Intervention(s):    Tobacco-Not Assessed  Details:  Intervention(s):          Continue current diet/physical activity routine. Increase exercise, monitor salt intake  Instructed to charge device.  Provided patient education. Diet, exercise       Addressed patient questions and patient has my contact information if needed prior to next outreach. Patient verbalizes understanding.      Explained the importance of self-monitoring and medication adherence. Encouraged the patient to communicate with their health  for lifestyle modifications to help improve or maintain a healthy lifestyle.               There are no preventive care reminders to display for this patient.      Last 5 Patient Entered Readings                                       Current 30 Day Average: 138/84     Recent Readings 12/14/2020 12/14/2020 12/13/2020 12/13/2020 12/13/2020    SBP (mmHg) 142 142 134 143 129    DBP (mmHg) 77 89 75 99 91    Pulse 73 73 69 70 71

## 2020-12-16 NOTE — TELEPHONE ENCOUNTER
No new care gaps identified.  Powered by [employer]. Reference number: [id number]. 12/16/2020 10:25:09 AM   CST

## 2020-12-17 RX ORDER — FLUTICASONE PROPIONATE 50 MCG
2 SPRAY, SUSPENSION (ML) NASAL DAILY
Qty: 48 ML | Refills: 1 | Status: SHIPPED | OUTPATIENT
Start: 2020-12-17 | End: 2021-06-15

## 2020-12-17 NOTE — PROGRESS NOTES
Refill Authorization Note   Tracy Najera  is requesting a refill authorization.  Brief Assessment and Rationale for Refill:  Approve     Medication Therapy Plan:       Medication Reconciliation Completed: No   Comments:       Requested Prescriptions   Pending Prescriptions Disp Refills    fluticasone propionate (FLONASE) 50 mcg/actuation nasal spray [Pharmacy Med Name: FLUTICASONE PROP 50 MCG SPRAY] 48 mL 1     Si SPRAYS (100 MCG TOTAL) BY EACH NOSTRIL ROUTE ONCE DAILY.       Ear, Nose, and Throat: Nasal Preparations - Corticosteroids Passed - 2020 10:24 AM        Passed - Patient is at least 18 years old        Passed - Office visit in past 12 months or future 90 days     Recent Outpatient Visits            4 months ago Encounter for preventive health examination    [facility] Sherine Singer NP    5 months ago Essential hypertension    [facility] Orlando Loera OD    5 months ago Annual physical exam    [facility] Mike Oakes MD    1 year ago Hypertension, unspecified type    [facility] Mike Oakes MD    1 year ago Hypertension, unspecified type    [facility] Mike Oakes MD          Future Appointments              In 1 week Mike Oakes MD [facility],  Medical                     Appointments  past 12m or future 3m with PCP    Date Provider   Last Visit   2020 Mike Oakes MD   Next Visit   2020 Mike Oakes MD   ED visits in past 90 days: 0     Note composed:11:28 AM 2020

## 2020-12-24 ENCOUNTER — OFFICE VISIT (OUTPATIENT)
Dept: INTERNAL MEDICINE | Facility: CLINIC | Age: 70
End: 2020-12-24
Payer: MEDICARE

## 2020-12-24 ENCOUNTER — LAB VISIT (OUTPATIENT)
Dept: LAB | Facility: HOSPITAL | Age: 70
End: 2020-12-24
Attending: FAMILY MEDICINE
Payer: MEDICARE

## 2020-12-24 VITALS
TEMPERATURE: 98 F | BODY MASS INDEX: 38.99 KG/M2 | DIASTOLIC BLOOD PRESSURE: 88 MMHG | RESPIRATION RATE: 18 BRPM | HEART RATE: 90 BPM | WEIGHT: 211.88 LBS | SYSTOLIC BLOOD PRESSURE: 164 MMHG | HEIGHT: 62 IN | OXYGEN SATURATION: 100 %

## 2020-12-24 DIAGNOSIS — R79.89 ELEVATED LFTS: ICD-10-CM

## 2020-12-24 DIAGNOSIS — I10 HYPERTENSION, UNSPECIFIED TYPE: ICD-10-CM

## 2020-12-24 DIAGNOSIS — D64.9 ANEMIA, UNSPECIFIED TYPE: Primary | ICD-10-CM

## 2020-12-24 DIAGNOSIS — N18.30 STAGE 3 CHRONIC KIDNEY DISEASE, UNSPECIFIED WHETHER STAGE 3A OR 3B CKD: ICD-10-CM

## 2020-12-24 DIAGNOSIS — D64.9 ANEMIA, UNSPECIFIED TYPE: ICD-10-CM

## 2020-12-24 LAB
ALBUMIN SERPL BCP-MCNC: 4 G/DL (ref 3.5–5.2)
ALP SERPL-CCNC: 102 U/L (ref 55–135)
ALT SERPL W/O P-5'-P-CCNC: 16 U/L (ref 10–44)
ANION GAP SERPL CALC-SCNC: 10 MMOL/L (ref 8–16)
AST SERPL-CCNC: 17 U/L (ref 10–40)
BASOPHILS # BLD AUTO: 0.03 K/UL (ref 0–0.2)
BASOPHILS NFR BLD: 0.4 % (ref 0–1.9)
BILIRUB SERPL-MCNC: 0.3 MG/DL (ref 0.1–1)
BUN SERPL-MCNC: 13 MG/DL (ref 8–23)
CALCIUM SERPL-MCNC: 9.4 MG/DL (ref 8.7–10.5)
CHLORIDE SERPL-SCNC: 108 MMOL/L (ref 95–110)
CO2 SERPL-SCNC: 23 MMOL/L (ref 23–29)
CREAT SERPL-MCNC: 0.9 MG/DL (ref 0.5–1.4)
DIFFERENTIAL METHOD: ABNORMAL
EOSINOPHIL # BLD AUTO: 0.1 K/UL (ref 0–0.5)
EOSINOPHIL NFR BLD: 1.1 % (ref 0–8)
ERYTHROCYTE [DISTWIDTH] IN BLOOD BY AUTOMATED COUNT: 14 % (ref 11.5–14.5)
EST. GFR  (AFRICAN AMERICAN): >60 ML/MIN/1.73 M^2
EST. GFR  (NON AFRICAN AMERICAN): >60 ML/MIN/1.73 M^2
FERRITIN SERPL-MCNC: 209 NG/ML (ref 20–300)
GLUCOSE SERPL-MCNC: 89 MG/DL (ref 70–110)
HCT VFR BLD AUTO: 39.6 % (ref 37–48.5)
HGB BLD-MCNC: 12.2 G/DL (ref 12–16)
IMM GRANULOCYTES # BLD AUTO: 0.03 K/UL (ref 0–0.04)
IMM GRANULOCYTES NFR BLD AUTO: 0.4 % (ref 0–0.5)
IRON SERPL-MCNC: 63 UG/DL (ref 30–160)
LYMPHOCYTES # BLD AUTO: 1.9 K/UL (ref 1–4.8)
LYMPHOCYTES NFR BLD: 25.6 % (ref 18–48)
MCH RBC QN AUTO: 28.3 PG (ref 27–31)
MCHC RBC AUTO-ENTMCNC: 30.8 G/DL (ref 32–36)
MCV RBC AUTO: 92 FL (ref 82–98)
MONOCYTES # BLD AUTO: 0.7 K/UL (ref 0.3–1)
MONOCYTES NFR BLD: 8.9 % (ref 4–15)
NEUTROPHILS # BLD AUTO: 4.8 K/UL (ref 1.8–7.7)
NEUTROPHILS NFR BLD: 63.6 % (ref 38–73)
NRBC BLD-RTO: 0 /100 WBC
PLATELET # BLD AUTO: 290 K/UL (ref 150–350)
PMV BLD AUTO: 12.1 FL (ref 9.2–12.9)
POTASSIUM SERPL-SCNC: 4.1 MMOL/L (ref 3.5–5.1)
PROT SERPL-MCNC: 8 G/DL (ref 6–8.4)
RBC # BLD AUTO: 4.31 M/UL (ref 4–5.4)
SATURATED IRON: 17 % (ref 20–50)
SODIUM SERPL-SCNC: 141 MMOL/L (ref 136–145)
TOTAL IRON BINDING CAPACITY: 377 UG/DL (ref 250–450)
TRANSFERRIN SERPL-MCNC: 255 MG/DL (ref 200–375)
WBC # BLD AUTO: 7.55 K/UL (ref 3.9–12.7)

## 2020-12-24 PROCEDURE — 3008F BODY MASS INDEX DOCD: CPT | Mod: HCNC,CPTII,S$GLB, | Performed by: FAMILY MEDICINE

## 2020-12-24 PROCEDURE — 85025 COMPLETE CBC W/AUTO DIFF WBC: CPT | Mod: HCNC

## 2020-12-24 PROCEDURE — 99999 PR PBB SHADOW E&M-EST. PATIENT-LVL IV: CPT | Mod: PBBFAC,HCNC,, | Performed by: FAMILY MEDICINE

## 2020-12-24 PROCEDURE — 1159F MED LIST DOCD IN RCRD: CPT | Mod: HCNC,S$GLB,, | Performed by: FAMILY MEDICINE

## 2020-12-24 PROCEDURE — 99499 RISK ADDL DX/OHS AUDIT: ICD-10-PCS | Mod: S$GLB,,, | Performed by: FAMILY MEDICINE

## 2020-12-24 PROCEDURE — 99214 OFFICE O/P EST MOD 30 MIN: CPT | Mod: HCNC,S$GLB,, | Performed by: FAMILY MEDICINE

## 2020-12-24 PROCEDURE — 1101F PR PT FALLS ASSESS DOC 0-1 FALLS W/OUT INJ PAST YR: ICD-10-PCS | Mod: HCNC,CPTII,S$GLB, | Performed by: FAMILY MEDICINE

## 2020-12-24 PROCEDURE — 3008F PR BODY MASS INDEX (BMI) DOCUMENTED: ICD-10-PCS | Mod: HCNC,CPTII,S$GLB, | Performed by: FAMILY MEDICINE

## 2020-12-24 PROCEDURE — 3079F DIAST BP 80-89 MM HG: CPT | Mod: HCNC,CPTII,S$GLB, | Performed by: FAMILY MEDICINE

## 2020-12-24 PROCEDURE — 99999 PR PBB SHADOW E&M-EST. PATIENT-LVL IV: ICD-10-PCS | Mod: PBBFAC,HCNC,, | Performed by: FAMILY MEDICINE

## 2020-12-24 PROCEDURE — 3079F PR MOST RECENT DIASTOLIC BLOOD PRESSURE 80-89 MM HG: ICD-10-PCS | Mod: HCNC,CPTII,S$GLB, | Performed by: FAMILY MEDICINE

## 2020-12-24 PROCEDURE — 3288F PR FALLS RISK ASSESSMENT DOCUMENTED: ICD-10-PCS | Mod: HCNC,CPTII,S$GLB, | Performed by: FAMILY MEDICINE

## 2020-12-24 PROCEDURE — 99214 PR OFFICE/OUTPT VISIT, EST, LEVL IV, 30-39 MIN: ICD-10-PCS | Mod: HCNC,S$GLB,, | Performed by: FAMILY MEDICINE

## 2020-12-24 PROCEDURE — 1126F PR PAIN SEVERITY QUANTIFIED, NO PAIN PRESENT: ICD-10-PCS | Mod: HCNC,S$GLB,, | Performed by: FAMILY MEDICINE

## 2020-12-24 PROCEDURE — 3077F PR MOST RECENT SYSTOLIC BLOOD PRESSURE >= 140 MM HG: ICD-10-PCS | Mod: HCNC,CPTII,S$GLB, | Performed by: FAMILY MEDICINE

## 2020-12-24 PROCEDURE — 36415 COLL VENOUS BLD VENIPUNCTURE: CPT | Mod: HCNC

## 2020-12-24 PROCEDURE — 99499 UNLISTED E&M SERVICE: CPT | Mod: S$GLB,,, | Performed by: FAMILY MEDICINE

## 2020-12-24 PROCEDURE — 1101F PT FALLS ASSESS-DOCD LE1/YR: CPT | Mod: HCNC,CPTII,S$GLB, | Performed by: FAMILY MEDICINE

## 2020-12-24 PROCEDURE — 83540 ASSAY OF IRON: CPT | Mod: HCNC

## 2020-12-24 PROCEDURE — 3077F SYST BP >= 140 MM HG: CPT | Mod: HCNC,CPTII,S$GLB, | Performed by: FAMILY MEDICINE

## 2020-12-24 PROCEDURE — 3288F FALL RISK ASSESSMENT DOCD: CPT | Mod: HCNC,CPTII,S$GLB, | Performed by: FAMILY MEDICINE

## 2020-12-24 PROCEDURE — 80053 COMPREHEN METABOLIC PANEL: CPT | Mod: HCNC

## 2020-12-24 PROCEDURE — 1159F PR MEDICATION LIST DOCUMENTED IN MEDICAL RECORD: ICD-10-PCS | Mod: HCNC,S$GLB,, | Performed by: FAMILY MEDICINE

## 2020-12-24 PROCEDURE — 1126F AMNT PAIN NOTED NONE PRSNT: CPT | Mod: HCNC,S$GLB,, | Performed by: FAMILY MEDICINE

## 2020-12-24 PROCEDURE — 82728 ASSAY OF FERRITIN: CPT | Mod: HCNC

## 2020-12-24 NOTE — PATIENT INSTRUCTIONS
Dr. Oakes Plan    Lab work  Checking blood count as it was a little low    Checking liver function test as last time was a little high.    Some concern over your pressure    I want to check and make sure your machine is accurate    Please log daily  Please come back soon with the Log AND the machine.    PLEASE DO NOT FORGET THE MACHINE.

## 2020-12-24 NOTE — PROGRESS NOTES
Subjective:       Patient ID: Tracy Najera is a 70 y.o. female.    Chief Complaint: Follow-up (6 mth)    HPI     70    Past Medical History:   Diagnosis Date    Anticoagulant long-term use     Plavix    Arthritis     CVA (cerebral vascular accident) 2008    R hemiparesis, R hand contracture    HTN (hypertension)     Hyperlipidemia     Severe obesity (BMI 35.0-39.9) with comorbidity     Special screening for malignant neoplasms, colon 4/16/2019     Past Surgical History:   Procedure Laterality Date    BREAST BIOPSY Left 2017    COLONOSCOPY N/A 4/16/2019    Procedure: COLONOSCOPY;  Surgeon: Ulisses Jacobsen MD;  Location: [facility] END[facility];  Service: Endoscopy;  Laterality: N/A;    HYSTERECTOMY      partial     TONSILLECTOMY       Social History     Tobacco Use   Smoking Status Never Smoker   Smokeless Tobacco Never Used     Family History   Problem Relation Age of Onset    Hypertension Mother     Hypertension Sister     Cancer Neg Hx     Diabetes Neg Hx     Heart disease Neg Hx     Kidney disease Neg Hx     Hyperlipidemia Neg Hx          Doing ok  Feeling good    Pressure  Reports its running good at home.    No cp  No sob    No dizzy  No ha    No vision          Review of Systems   Constitutional: Negative for chills.   HENT: Negative for trouble swallowing.    Respiratory: Negative for shortness of breath.    Gastrointestinal: Negative for abdominal pain.   Genitourinary: Negative for difficulty urinating.   Skin: Negative for color change.   Neurological: Negative for dizziness.   Psychiatric/Behavioral: Negative for confusion.       Objective:       Vitals:    12/24/20 0935   BP: (!) 164/88   Pulse: 90   Resp: 18   Temp: 98.2 °F (36.8 °C)       Physical Exam  Constitutional:       General: She is not in acute distress.     Appearance: Normal appearance. She is well-developed.   HENT:      Head: Normocephalic and atraumatic.   Eyes:      General: Lids are normal.      Conjunctiva/sclera: Conjunctivae  normal.   Neck:      Musculoskeletal: Full passive range of motion without pain and normal range of motion.   Cardiovascular:      Rate and Rhythm: Normal rate and regular rhythm.      Heart sounds: Normal heart sounds.   Pulmonary:      Effort: Pulmonary effort is normal. No respiratory distress.      Breath sounds: Normal breath sounds.   Abdominal:      General: There is no distension.      Palpations: Abdomen is soft.   Musculoskeletal: Normal range of motion.   Lymphadenopathy:      Cervical: No cervical adenopathy.   Skin:     Coloration: Skin is not pale.   Neurological:      Mental Status: She is alert and oriented to person, place, and time. She is not disoriented.   Psychiatric:         Speech: Speech normal.         Behavior: Behavior normal.         Thought Content: Thought content normal.         Assessment:       1. Anemia, unspecified type    2. Elevated LFTs    3. Hypertension, unspecified type    4. Stage 3 chronic kidney disease, unspecified whether stage 3a or 3b CKD        Plan:   Anemia, unspecified type  -     CBC Auto Differential; Future; Expected date: 12/24/2020  -     Iron and TIBC; Future; Expected date: 12/24/2020  -     Ferritin; Future; Expected date: 12/24/2020    Elevated LFTs  -     Comprehensive Metabolic Panel; Future; Expected date: 12/24/2020    Hypertension, unspecified type    Stage 3 chronic kidney disease, unspecified whether stage 3a or 3b CKD      Hypertension    Reports controlled at home  Not controlled in office - elevated    Losartan 1000  norvasc 10 mg    Close f/u given elevation   Advised/ requested bring in log and machine to ensure home machine accurate.    Vit d insuff  Taking vit d      Elevated lft  Recheck today      Anemia  Recheck  Iron  Ferritin  tibc      CKD III  Avoid NSAID  Monitor

## 2021-01-14 ENCOUNTER — OFFICE VISIT (OUTPATIENT)
Dept: INTERNAL MEDICINE | Facility: CLINIC | Age: 71
End: 2021-01-14
Payer: MEDICARE

## 2021-01-14 VITALS
WEIGHT: 216.94 LBS | DIASTOLIC BLOOD PRESSURE: 78 MMHG | BODY MASS INDEX: 39.92 KG/M2 | TEMPERATURE: 97 F | HEIGHT: 62 IN | HEART RATE: 90 BPM | SYSTOLIC BLOOD PRESSURE: 140 MMHG

## 2021-01-14 DIAGNOSIS — J30.9 ALLERGIC RHINITIS, UNSPECIFIED SEASONALITY, UNSPECIFIED TRIGGER: ICD-10-CM

## 2021-01-14 DIAGNOSIS — I10 HYPERTENSION, UNSPECIFIED TYPE: Primary | ICD-10-CM

## 2021-01-14 DIAGNOSIS — E55.9 VITAMIN D INSUFFICIENCY: ICD-10-CM

## 2021-01-14 DIAGNOSIS — Z12.31 ENCOUNTER FOR SCREENING MAMMOGRAM FOR MALIGNANT NEOPLASM OF BREAST: ICD-10-CM

## 2021-01-14 DIAGNOSIS — M85.80 OSTEOPENIA, UNSPECIFIED LOCATION: ICD-10-CM

## 2021-01-14 DIAGNOSIS — Z12.39 ENCOUNTER FOR SCREENING FOR MALIGNANT NEOPLASM OF BREAST, UNSPECIFIED SCREENING MODALITY: ICD-10-CM

## 2021-01-14 PROCEDURE — 3008F BODY MASS INDEX DOCD: CPT | Mod: HCNC,CPTII,S$GLB, | Performed by: FAMILY MEDICINE

## 2021-01-14 PROCEDURE — 1126F PR PAIN SEVERITY QUANTIFIED, NO PAIN PRESENT: ICD-10-PCS | Mod: HCNC,S$GLB,, | Performed by: FAMILY MEDICINE

## 2021-01-14 PROCEDURE — 1159F PR MEDICATION LIST DOCUMENTED IN MEDICAL RECORD: ICD-10-PCS | Mod: HCNC,S$GLB,, | Performed by: FAMILY MEDICINE

## 2021-01-14 PROCEDURE — 3288F PR FALLS RISK ASSESSMENT DOCUMENTED: ICD-10-PCS | Mod: HCNC,CPTII,S$GLB, | Performed by: FAMILY MEDICINE

## 2021-01-14 PROCEDURE — 1101F PT FALLS ASSESS-DOCD LE1/YR: CPT | Mod: HCNC,CPTII,S$GLB, | Performed by: FAMILY MEDICINE

## 2021-01-14 PROCEDURE — 3078F PR MOST RECENT DIASTOLIC BLOOD PRESSURE < 80 MM HG: ICD-10-PCS | Mod: HCNC,CPTII,S$GLB, | Performed by: FAMILY MEDICINE

## 2021-01-14 PROCEDURE — 99999 PR PBB SHADOW E&M-EST. PATIENT-LVL III: ICD-10-PCS | Mod: PBBFAC,HCNC,, | Performed by: FAMILY MEDICINE

## 2021-01-14 PROCEDURE — 1126F AMNT PAIN NOTED NONE PRSNT: CPT | Mod: HCNC,S$GLB,, | Performed by: FAMILY MEDICINE

## 2021-01-14 PROCEDURE — 3288F FALL RISK ASSESSMENT DOCD: CPT | Mod: HCNC,CPTII,S$GLB, | Performed by: FAMILY MEDICINE

## 2021-01-14 PROCEDURE — 3078F DIAST BP <80 MM HG: CPT | Mod: HCNC,CPTII,S$GLB, | Performed by: FAMILY MEDICINE

## 2021-01-14 PROCEDURE — 99214 OFFICE O/P EST MOD 30 MIN: CPT | Mod: HCNC,S$GLB,, | Performed by: FAMILY MEDICINE

## 2021-01-14 PROCEDURE — 3077F PR MOST RECENT SYSTOLIC BLOOD PRESSURE >= 140 MM HG: ICD-10-PCS | Mod: HCNC,CPTII,S$GLB, | Performed by: FAMILY MEDICINE

## 2021-01-14 PROCEDURE — 99214 PR OFFICE/OUTPT VISIT, EST, LEVL IV, 30-39 MIN: ICD-10-PCS | Mod: HCNC,S$GLB,, | Performed by: FAMILY MEDICINE

## 2021-01-14 PROCEDURE — 3077F SYST BP >= 140 MM HG: CPT | Mod: HCNC,CPTII,S$GLB, | Performed by: FAMILY MEDICINE

## 2021-01-14 PROCEDURE — 1159F MED LIST DOCD IN RCRD: CPT | Mod: HCNC,S$GLB,, | Performed by: FAMILY MEDICINE

## 2021-01-14 PROCEDURE — 99999 PR PBB SHADOW E&M-EST. PATIENT-LVL III: CPT | Mod: PBBFAC,HCNC,, | Performed by: FAMILY MEDICINE

## 2021-01-14 PROCEDURE — 3008F PR BODY MASS INDEX (BMI) DOCUMENTED: ICD-10-PCS | Mod: HCNC,CPTII,S$GLB, | Performed by: FAMILY MEDICINE

## 2021-01-14 PROCEDURE — 1101F PR PT FALLS ASSESS DOC 0-1 FALLS W/OUT INJ PAST YR: ICD-10-PCS | Mod: HCNC,CPTII,S$GLB, | Performed by: FAMILY MEDICINE

## 2021-01-14 RX ORDER — AMLODIPINE BESYLATE 10 MG/1
10 TABLET ORAL DAILY
Qty: 90 TABLET | Refills: 2 | Status: SHIPPED | OUTPATIENT
Start: 2021-01-14 | End: 2021-07-15 | Stop reason: SDUPTHER

## 2021-02-22 ENCOUNTER — PES CALL (OUTPATIENT)
Dept: ADMINISTRATIVE | Facility: CLINIC | Age: 71
End: 2021-02-22

## 2021-02-26 ENCOUNTER — HOSPITAL ENCOUNTER (OUTPATIENT)
Dept: RADIOLOGY | Facility: HOSPITAL | Age: 71
Discharge: HOME | End: 2021-02-26
Attending: FAMILY MEDICINE
Payer: MEDICARE

## 2021-02-26 VITALS — BODY MASS INDEX: 39.76 KG/M2 | WEIGHT: 216.06 LBS | HEIGHT: 62 IN

## 2021-02-26 DIAGNOSIS — Z12.31 ENCOUNTER FOR SCREENING MAMMOGRAM FOR MALIGNANT NEOPLASM OF BREAST: ICD-10-CM

## 2021-02-26 DIAGNOSIS — Z12.39 ENCOUNTER FOR SCREENING FOR MALIGNANT NEOPLASM OF BREAST, UNSPECIFIED SCREENING MODALITY: ICD-10-CM

## 2021-02-26 PROCEDURE — 77067 SCR MAMMO BI INCL CAD: CPT | Mod: TC

## 2021-02-26 PROCEDURE — 77063 MAMMO DIGITAL SCREENING BILAT WITH TOMO: ICD-10-PCS | Mod: 26,,, | Performed by: RADIOLOGY

## 2021-02-26 PROCEDURE — 77063 BREAST TOMOSYNTHESIS BI: CPT | Mod: 26,,, | Performed by: RADIOLOGY

## 2021-02-26 PROCEDURE — 77067 MAMMO DIGITAL SCREENING BILAT WITH TOMO: ICD-10-PCS | Mod: 26,,, | Performed by: RADIOLOGY

## 2021-02-26 PROCEDURE — 77067 SCR MAMMO BI INCL CAD: CPT | Mod: 26,,, | Performed by: RADIOLOGY

## 2021-03-11 ENCOUNTER — TELEPHONE (OUTPATIENT)
Dept: ADMINISTRATIVE | Facility: HOSPITAL | Age: 71
End: 2021-03-11

## 2021-03-19 ENCOUNTER — OFFICE VISIT (OUTPATIENT)
Dept: HOME HEALTH SERVICES | Facility: CLINIC | Age: 71
End: 2021-03-19
Payer: MEDICARE

## 2021-03-19 VITALS
DIASTOLIC BLOOD PRESSURE: 76 MMHG | OXYGEN SATURATION: 98 % | SYSTOLIC BLOOD PRESSURE: 122 MMHG | HEIGHT: 62 IN | HEART RATE: 85 BPM | BODY MASS INDEX: 39.75 KG/M2 | TEMPERATURE: 98 F | WEIGHT: 216 LBS

## 2021-03-19 DIAGNOSIS — I10 ESSENTIAL HYPERTENSION: ICD-10-CM

## 2021-03-19 DIAGNOSIS — E66.01 SEVERE OBESITY (BMI 35.0-39.9) WITH COMORBIDITY: ICD-10-CM

## 2021-03-19 DIAGNOSIS — M85.80 OSTEOPENIA, UNSPECIFIED LOCATION: ICD-10-CM

## 2021-03-19 DIAGNOSIS — I70.0 ATHEROSCLEROSIS OF AORTA: ICD-10-CM

## 2021-03-19 DIAGNOSIS — Z00.00 ENCOUNTER FOR PREVENTIVE HEALTH EXAMINATION: Primary | ICD-10-CM

## 2021-03-19 DIAGNOSIS — Z74.09 OTHER REDUCED MOBILITY: ICD-10-CM

## 2021-03-19 DIAGNOSIS — I69.351 HEMIPARESIS AFFECTING RIGHT SIDE AS LATE EFFECT OF CEREBROVASCULAR ACCIDENT (CVA): ICD-10-CM

## 2021-03-19 DIAGNOSIS — I69.30 HISTORY OF CVA WITH RESIDUAL DEFICIT: ICD-10-CM

## 2021-03-19 DIAGNOSIS — E78.5 HYPERLIPIDEMIA, UNSPECIFIED HYPERLIPIDEMIA TYPE: ICD-10-CM

## 2021-03-19 DIAGNOSIS — Z86.010 HISTORY OF COLON POLYPS: ICD-10-CM

## 2021-03-19 DIAGNOSIS — R60.0 BILATERAL LOWER EXTREMITY EDEMA: ICD-10-CM

## 2021-03-19 PROBLEM — N64.52 BLOODY DISCHARGE FROM LEFT NIPPLE: Status: RESOLVED | Noted: 2017-06-01 | Resolved: 2021-03-19

## 2021-03-19 PROCEDURE — 3008F BODY MASS INDEX DOCD: CPT | Mod: CPTII,S$GLB,, | Performed by: NURSE PRACTITIONER

## 2021-03-19 PROCEDURE — 3288F PR FALLS RISK ASSESSMENT DOCUMENTED: ICD-10-PCS | Mod: CPTII,S$GLB,, | Performed by: NURSE PRACTITIONER

## 2021-03-19 PROCEDURE — 3008F PR BODY MASS INDEX (BMI) DOCUMENTED: ICD-10-PCS | Mod: CPTII,S$GLB,, | Performed by: NURSE PRACTITIONER

## 2021-03-19 PROCEDURE — 3074F SYST BP LT 130 MM HG: CPT | Mod: CPTII,S$GLB,, | Performed by: NURSE PRACTITIONER

## 2021-03-19 PROCEDURE — 3078F PR MOST RECENT DIASTOLIC BLOOD PRESSURE < 80 MM HG: ICD-10-PCS | Mod: CPTII,S$GLB,, | Performed by: NURSE PRACTITIONER

## 2021-03-19 PROCEDURE — 3078F DIAST BP <80 MM HG: CPT | Mod: CPTII,S$GLB,, | Performed by: NURSE PRACTITIONER

## 2021-03-19 PROCEDURE — G0439 PPPS, SUBSEQ VISIT: HCPCS | Mod: S$GLB,,, | Performed by: NURSE PRACTITIONER

## 2021-03-19 PROCEDURE — 1101F PR PT FALLS ASSESS DOC 0-1 FALLS W/OUT INJ PAST YR: ICD-10-PCS | Mod: CPTII,S$GLB,, | Performed by: NURSE PRACTITIONER

## 2021-03-19 PROCEDURE — 1158F PR ADVANCE CARE PLANNING DISCUSS DOCUMENTED IN MEDICAL RECORD: ICD-10-PCS | Mod: S$GLB,,, | Performed by: NURSE PRACTITIONER

## 2021-03-19 PROCEDURE — G9920 SCRNING PERF AND NEGATIVE: HCPCS | Mod: CPTII,S$GLB,, | Performed by: NURSE PRACTITIONER

## 2021-03-19 PROCEDURE — 1126F AMNT PAIN NOTED NONE PRSNT: CPT | Mod: S$GLB,,, | Performed by: NURSE PRACTITIONER

## 2021-03-19 PROCEDURE — G9920 PR SCREENING AND NEGATIVE: ICD-10-PCS | Mod: CPTII,S$GLB,, | Performed by: NURSE PRACTITIONER

## 2021-03-19 PROCEDURE — G0439 PR MEDICARE ANNUAL WELLNESS SUBSEQUENT VISIT: ICD-10-PCS | Mod: S$GLB,,, | Performed by: NURSE PRACTITIONER

## 2021-03-19 PROCEDURE — 3074F PR MOST RECENT SYSTOLIC BLOOD PRESSURE < 130 MM HG: ICD-10-PCS | Mod: CPTII,S$GLB,, | Performed by: NURSE PRACTITIONER

## 2021-03-19 PROCEDURE — 3288F FALL RISK ASSESSMENT DOCD: CPT | Mod: CPTII,S$GLB,, | Performed by: NURSE PRACTITIONER

## 2021-03-19 PROCEDURE — 1101F PT FALLS ASSESS-DOCD LE1/YR: CPT | Mod: CPTII,S$GLB,, | Performed by: NURSE PRACTITIONER

## 2021-03-19 PROCEDURE — 1126F PR PAIN SEVERITY QUANTIFIED, NO PAIN PRESENT: ICD-10-PCS | Mod: S$GLB,,, | Performed by: NURSE PRACTITIONER

## 2021-03-19 PROCEDURE — 1158F ADVNC CARE PLAN TLK DOCD: CPT | Mod: S$GLB,,, | Performed by: NURSE PRACTITIONER

## 2021-03-19 RX ORDER — ACETAMINOPHEN 500 MG
2000 TABLET ORAL DAILY
COMMUNITY

## 2021-04-28 ENCOUNTER — PATIENT MESSAGE (OUTPATIENT)
Dept: RESEARCH | Facility: HOSPITAL | Age: 71
End: 2021-04-28

## 2021-06-13 DIAGNOSIS — E78.5 HYPERLIPIDEMIA, UNSPECIFIED HYPERLIPIDEMIA TYPE: Primary | ICD-10-CM

## 2021-06-13 DIAGNOSIS — J30.9 ALLERGIC RHINITIS, UNSPECIFIED SEASONALITY, UNSPECIFIED TRIGGER: ICD-10-CM

## 2021-06-15 RX ORDER — FLUTICASONE PROPIONATE 50 MCG
2 SPRAY, SUSPENSION (ML) NASAL DAILY
Qty: 48 ML | Refills: 1 | Status: SHIPPED | OUTPATIENT
Start: 2021-06-15 | End: 2022-02-09

## 2021-07-07 DIAGNOSIS — I69.30 HISTORY OF CVA WITH RESIDUAL DEFICIT: ICD-10-CM

## 2021-07-07 RX ORDER — ATORVASTATIN CALCIUM 40 MG/1
TABLET, FILM COATED ORAL
Qty: 90 TABLET | Refills: 0 | Status: SHIPPED | OUTPATIENT
Start: 2021-07-07 | End: 2021-09-29

## 2021-07-08 RX ORDER — CLOPIDOGREL BISULFATE 75 MG/1
TABLET ORAL
Qty: 90 TABLET | Refills: 1 | Status: SHIPPED | OUTPATIENT
Start: 2021-07-08 | End: 2021-07-15 | Stop reason: SDUPTHER

## 2021-07-15 ENCOUNTER — OFFICE VISIT (OUTPATIENT)
Dept: INTERNAL MEDICINE | Facility: CLINIC | Age: 71
End: 2021-07-15
Payer: MEDICARE

## 2021-07-15 VITALS
BODY MASS INDEX: 40.2 KG/M2 | SYSTOLIC BLOOD PRESSURE: 120 MMHG | OXYGEN SATURATION: 98 % | TEMPERATURE: 96 F | WEIGHT: 219.81 LBS | RESPIRATION RATE: 20 BRPM | HEART RATE: 92 BPM | DIASTOLIC BLOOD PRESSURE: 80 MMHG

## 2021-07-15 DIAGNOSIS — E66.01 CLASS 3 SEVERE OBESITY WITH BODY MASS INDEX (BMI) OF 40.0 TO 44.9 IN ADULT, UNSPECIFIED OBESITY TYPE, UNSPECIFIED WHETHER SERIOUS COMORBIDITY PRESENT: Primary | ICD-10-CM

## 2021-07-15 DIAGNOSIS — I10 HYPERTENSION, UNSPECIFIED TYPE: ICD-10-CM

## 2021-07-15 DIAGNOSIS — I69.30 HISTORY OF CVA WITH RESIDUAL DEFICIT: ICD-10-CM

## 2021-07-15 PROCEDURE — 99999 PR PBB SHADOW E&M-EST. PATIENT-LVL III: CPT | Mod: PBBFAC,,, | Performed by: FAMILY MEDICINE

## 2021-07-15 PROCEDURE — 3288F PR FALLS RISK ASSESSMENT DOCUMENTED: ICD-10-PCS | Mod: CPTII,S$GLB,, | Performed by: FAMILY MEDICINE

## 2021-07-15 PROCEDURE — 1101F PT FALLS ASSESS-DOCD LE1/YR: CPT | Mod: CPTII,S$GLB,, | Performed by: FAMILY MEDICINE

## 2021-07-15 PROCEDURE — 3079F DIAST BP 80-89 MM HG: CPT | Mod: CPTII,S$GLB,, | Performed by: FAMILY MEDICINE

## 2021-07-15 PROCEDURE — 3074F SYST BP LT 130 MM HG: CPT | Mod: CPTII,S$GLB,, | Performed by: FAMILY MEDICINE

## 2021-07-15 PROCEDURE — 1159F MED LIST DOCD IN RCRD: CPT | Mod: S$GLB,,, | Performed by: FAMILY MEDICINE

## 2021-07-15 PROCEDURE — 3288F FALL RISK ASSESSMENT DOCD: CPT | Mod: CPTII,S$GLB,, | Performed by: FAMILY MEDICINE

## 2021-07-15 PROCEDURE — 99499 UNLISTED E&M SERVICE: CPT | Mod: S$GLB,,, | Performed by: FAMILY MEDICINE

## 2021-07-15 PROCEDURE — 99214 OFFICE O/P EST MOD 30 MIN: CPT | Mod: S$GLB,,, | Performed by: FAMILY MEDICINE

## 2021-07-15 PROCEDURE — 99999 PR PBB SHADOW E&M-EST. PATIENT-LVL III: ICD-10-PCS | Mod: PBBFAC,,, | Performed by: FAMILY MEDICINE

## 2021-07-15 PROCEDURE — 1159F PR MEDICATION LIST DOCUMENTED IN MEDICAL RECORD: ICD-10-PCS | Mod: S$GLB,,, | Performed by: FAMILY MEDICINE

## 2021-07-15 PROCEDURE — 3008F BODY MASS INDEX DOCD: CPT | Mod: CPTII,S$GLB,, | Performed by: FAMILY MEDICINE

## 2021-07-15 PROCEDURE — 99214 PR OFFICE/OUTPT VISIT, EST, LEVL IV, 30-39 MIN: ICD-10-PCS | Mod: S$GLB,,, | Performed by: FAMILY MEDICINE

## 2021-07-15 PROCEDURE — 3074F PR MOST RECENT SYSTOLIC BLOOD PRESSURE < 130 MM HG: ICD-10-PCS | Mod: CPTII,S$GLB,, | Performed by: FAMILY MEDICINE

## 2021-07-15 PROCEDURE — 1101F PR PT FALLS ASSESS DOC 0-1 FALLS W/OUT INJ PAST YR: ICD-10-PCS | Mod: CPTII,S$GLB,, | Performed by: FAMILY MEDICINE

## 2021-07-15 PROCEDURE — 3079F PR MOST RECENT DIASTOLIC BLOOD PRESSURE 80-89 MM HG: ICD-10-PCS | Mod: CPTII,S$GLB,, | Performed by: FAMILY MEDICINE

## 2021-07-15 PROCEDURE — 3008F PR BODY MASS INDEX (BMI) DOCUMENTED: ICD-10-PCS | Mod: CPTII,S$GLB,, | Performed by: FAMILY MEDICINE

## 2021-07-15 PROCEDURE — 99499 RISK ADDL DX/OHS AUDIT: ICD-10-PCS | Mod: S$GLB,,, | Performed by: FAMILY MEDICINE

## 2021-07-15 RX ORDER — CLOPIDOGREL BISULFATE 75 MG/1
75 TABLET ORAL DAILY
Qty: 90 TABLET | Refills: 1 | Status: SHIPPED | OUTPATIENT
Start: 2021-07-15 | End: 2022-01-18 | Stop reason: SDUPTHER

## 2021-07-15 RX ORDER — AMLODIPINE BESYLATE 10 MG/1
10 TABLET ORAL DAILY
Qty: 90 TABLET | Refills: 1 | Status: SHIPPED | OUTPATIENT
Start: 2021-07-15 | End: 2022-01-18 | Stop reason: SDUPTHER

## 2021-07-15 RX ORDER — LOSARTAN POTASSIUM 100 MG/1
100 TABLET ORAL DAILY
Qty: 90 TABLET | Refills: 1 | Status: SHIPPED | OUTPATIENT
Start: 2021-07-15 | End: 2022-01-18 | Stop reason: SDUPTHER

## 2021-07-28 ENCOUNTER — TELEPHONE (OUTPATIENT)
Dept: INTERNAL MEDICINE | Facility: CLINIC | Age: 71
End: 2021-07-28

## 2021-09-29 RX ORDER — ATORVASTATIN CALCIUM 40 MG/1
TABLET, FILM COATED ORAL
Qty: 90 TABLET | Refills: 0 | Status: SHIPPED | OUTPATIENT
Start: 2021-09-29 | End: 2021-12-15

## 2021-12-15 RX ORDER — ATORVASTATIN CALCIUM 40 MG/1
TABLET, FILM COATED ORAL
Qty: 90 TABLET | Refills: 0 | Status: SHIPPED | OUTPATIENT
Start: 2021-12-15 | End: 2022-01-18 | Stop reason: SDUPTHER

## 2022-01-04 ENCOUNTER — PES CALL (OUTPATIENT)
Dept: ADMINISTRATIVE | Facility: CLINIC | Age: 72
End: 2022-01-04
Payer: MEDICARE

## 2022-01-18 ENCOUNTER — OFFICE VISIT (OUTPATIENT)
Dept: INTERNAL MEDICINE | Facility: CLINIC | Age: 72
End: 2022-01-18
Payer: MEDICARE

## 2022-01-18 ENCOUNTER — PATIENT MESSAGE (OUTPATIENT)
Dept: INFECTIOUS DISEASES | Facility: HOSPITAL | Age: 72
End: 2022-01-18
Payer: MEDICARE

## 2022-01-18 VITALS
RESPIRATION RATE: 18 BRPM | OXYGEN SATURATION: 99 % | HEART RATE: 109 BPM | SYSTOLIC BLOOD PRESSURE: 133 MMHG | TEMPERATURE: 100 F | BODY MASS INDEX: 42.44 KG/M2 | WEIGHT: 230.63 LBS | HEIGHT: 62 IN | DIASTOLIC BLOOD PRESSURE: 76 MMHG

## 2022-01-18 DIAGNOSIS — J06.9 URTI (ACUTE UPPER RESPIRATORY INFECTION): Primary | ICD-10-CM

## 2022-01-18 DIAGNOSIS — I69.30 HISTORY OF CVA WITH RESIDUAL DEFICIT: ICD-10-CM

## 2022-01-18 DIAGNOSIS — E66.01 CLASS 3 SEVERE OBESITY WITH BODY MASS INDEX (BMI) OF 40.0 TO 44.9 IN ADULT, UNSPECIFIED OBESITY TYPE, UNSPECIFIED WHETHER SERIOUS COMORBIDITY PRESENT: ICD-10-CM

## 2022-01-18 DIAGNOSIS — U07.1 COVID-19: ICD-10-CM

## 2022-01-18 DIAGNOSIS — Z12.31 ENCOUNTER FOR SCREENING MAMMOGRAM FOR MALIGNANT NEOPLASM OF BREAST: ICD-10-CM

## 2022-01-18 DIAGNOSIS — I10 HYPERTENSION, UNSPECIFIED TYPE: ICD-10-CM

## 2022-01-18 DIAGNOSIS — I69.351 HEMIPARESIS AFFECTING RIGHT SIDE AS LATE EFFECT OF CEREBROVASCULAR ACCIDENT (CVA): ICD-10-CM

## 2022-01-18 DIAGNOSIS — I70.0 ATHEROSCLEROSIS OF AORTA: ICD-10-CM

## 2022-01-18 LAB
CTP QC/QA: YES
SARS-COV-2 RDRP RESP QL NAA+PROBE: POSITIVE

## 2022-01-18 PROCEDURE — 1101F PR PT FALLS ASSESS DOC 0-1 FALLS W/OUT INJ PAST YR: ICD-10-PCS | Mod: HCNC,CPTII,S$GLB, | Performed by: FAMILY MEDICINE

## 2022-01-18 PROCEDURE — 1101F PT FALLS ASSESS-DOCD LE1/YR: CPT | Mod: HCNC,CPTII,S$GLB, | Performed by: FAMILY MEDICINE

## 2022-01-18 PROCEDURE — 3075F SYST BP GE 130 - 139MM HG: CPT | Mod: HCNC,CPTII,S$GLB, | Performed by: FAMILY MEDICINE

## 2022-01-18 PROCEDURE — 99499 UNLISTED E&M SERVICE: CPT | Mod: S$GLB,,, | Performed by: FAMILY MEDICINE

## 2022-01-18 PROCEDURE — 99499 RISK ADDL DX/OHS AUDIT: ICD-10-PCS | Mod: S$GLB,,, | Performed by: FAMILY MEDICINE

## 2022-01-18 PROCEDURE — U0002 COVID-19 LAB TEST NON-CDC: HCPCS | Mod: QW,HCNC,S$GLB, | Performed by: FAMILY MEDICINE

## 2022-01-18 PROCEDURE — 99999 PR PBB SHADOW E&M-EST. PATIENT-LVL III: CPT | Mod: PBBFAC,HCNC,, | Performed by: FAMILY MEDICINE

## 2022-01-18 PROCEDURE — 99214 OFFICE O/P EST MOD 30 MIN: CPT | Mod: HCNC,S$GLB,, | Performed by: FAMILY MEDICINE

## 2022-01-18 PROCEDURE — 3288F PR FALLS RISK ASSESSMENT DOCUMENTED: ICD-10-PCS | Mod: HCNC,CPTII,S$GLB, | Performed by: FAMILY MEDICINE

## 2022-01-18 PROCEDURE — 3008F PR BODY MASS INDEX (BMI) DOCUMENTED: ICD-10-PCS | Mod: HCNC,CPTII,S$GLB, | Performed by: FAMILY MEDICINE

## 2022-01-18 PROCEDURE — 3078F PR MOST RECENT DIASTOLIC BLOOD PRESSURE < 80 MM HG: ICD-10-PCS | Mod: HCNC,CPTII,S$GLB, | Performed by: FAMILY MEDICINE

## 2022-01-18 PROCEDURE — 1159F MED LIST DOCD IN RCRD: CPT | Mod: HCNC,CPTII,S$GLB, | Performed by: FAMILY MEDICINE

## 2022-01-18 PROCEDURE — 1126F PR PAIN SEVERITY QUANTIFIED, NO PAIN PRESENT: ICD-10-PCS | Mod: HCNC,CPTII,S$GLB, | Performed by: FAMILY MEDICINE

## 2022-01-18 PROCEDURE — 3008F BODY MASS INDEX DOCD: CPT | Mod: HCNC,CPTII,S$GLB, | Performed by: FAMILY MEDICINE

## 2022-01-18 PROCEDURE — 1126F AMNT PAIN NOTED NONE PRSNT: CPT | Mod: HCNC,CPTII,S$GLB, | Performed by: FAMILY MEDICINE

## 2022-01-18 PROCEDURE — 4010F PR ACE/ARB THEARPY RXD/TAKEN: ICD-10-PCS | Mod: HCNC,CPTII,S$GLB, | Performed by: FAMILY MEDICINE

## 2022-01-18 PROCEDURE — 3078F DIAST BP <80 MM HG: CPT | Mod: HCNC,CPTII,S$GLB, | Performed by: FAMILY MEDICINE

## 2022-01-18 PROCEDURE — 99999 PR PBB SHADOW E&M-EST. PATIENT-LVL III: ICD-10-PCS | Mod: PBBFAC,HCNC,, | Performed by: FAMILY MEDICINE

## 2022-01-18 PROCEDURE — 3075F PR MOST RECENT SYSTOLIC BLOOD PRESS GE 130-139MM HG: ICD-10-PCS | Mod: HCNC,CPTII,S$GLB, | Performed by: FAMILY MEDICINE

## 2022-01-18 PROCEDURE — U0002: ICD-10-PCS | Mod: QW,HCNC,S$GLB, | Performed by: FAMILY MEDICINE

## 2022-01-18 PROCEDURE — 3288F FALL RISK ASSESSMENT DOCD: CPT | Mod: HCNC,CPTII,S$GLB, | Performed by: FAMILY MEDICINE

## 2022-01-18 PROCEDURE — 99214 PR OFFICE/OUTPT VISIT, EST, LEVL IV, 30-39 MIN: ICD-10-PCS | Mod: HCNC,S$GLB,, | Performed by: FAMILY MEDICINE

## 2022-01-18 PROCEDURE — 4010F ACE/ARB THERAPY RXD/TAKEN: CPT | Mod: HCNC,CPTII,S$GLB, | Performed by: FAMILY MEDICINE

## 2022-01-18 PROCEDURE — 1159F PR MEDICATION LIST DOCUMENTED IN MEDICAL RECORD: ICD-10-PCS | Mod: HCNC,CPTII,S$GLB, | Performed by: FAMILY MEDICINE

## 2022-01-18 RX ORDER — LOSARTAN POTASSIUM 100 MG/1
100 TABLET ORAL DAILY
Qty: 90 TABLET | Refills: 1 | Status: SHIPPED | OUTPATIENT
Start: 2022-01-18 | End: 2022-06-29

## 2022-01-18 RX ORDER — AMLODIPINE BESYLATE 10 MG/1
10 TABLET ORAL DAILY
Qty: 90 TABLET | Refills: 1 | Status: SHIPPED | OUTPATIENT
Start: 2022-01-18 | End: 2022-06-29

## 2022-01-18 RX ORDER — ATORVASTATIN CALCIUM 40 MG/1
40 TABLET, FILM COATED ORAL DAILY
Qty: 90 TABLET | Refills: 1 | Status: SHIPPED | OUTPATIENT
Start: 2022-01-18 | End: 2022-10-12

## 2022-01-18 RX ORDER — CLOPIDOGREL BISULFATE 75 MG/1
75 TABLET ORAL DAILY
Qty: 90 TABLET | Refills: 1 | Status: SHIPPED | OUTPATIENT
Start: 2022-01-18 | End: 2022-06-29

## 2022-01-18 NOTE — PROGRESS NOTES
Subjective:       Patient ID: Tracy Najera is a 71 y.o. female.    Chief Complaint: Follow-up    HPI    Patient Active Problem List   Diagnosis    History of CVA with residual deficit    Hyperlipidemia    Essential hypertension    Atherosclerosis of aorta    Osteopenia    Severe obesity (BMI 35.0-39.9) with comorbidity    History of colon polyps    Bilateral lower extremity edema    Hemiparesis affecting right side as late effect of cerebrovascular accident (CVA)       Past Medical History:   Diagnosis Date    Anticoagulant long-term use     Plavix    Arthritis     Bloody discharge from left nipple 6/1/2017    CVA (cerebral vascular accident) 2008    R hemiparesis, R hand contracture    HTN (hypertension)     Hyperlipidemia     Severe obesity (BMI 35.0-39.9) with comorbidity     Special screening for malignant neoplasms, colon 4/16/2019       Past Surgical History:   Procedure Laterality Date    BREAST BIOPSY Left 2017    COLONOSCOPY N/A 4/16/2019    Procedure: COLONOSCOPY;  Surgeon: Ulisses Jacobsen MD;  Location: [facility];  Service: Endoscopy;  Laterality: N/A;    HYSTERECTOMY      partial     TONSILLECTOMY         Family History   Problem Relation Age of Onset    Hypertension Mother     Hypertension Sister     Cancer Neg Hx     Diabetes Neg Hx     Heart disease Neg Hx     Kidney disease Neg Hx     Hyperlipidemia Neg Hx        Social History     Tobacco Use   Smoking Status Never Smoker   Smokeless Tobacco Never Used       Wt Readings from Last 5 Encounters:   01/18/22 104.6 kg (230 lb 9.6 oz)   07/15/21 99.7 kg (219 lb 12.8 oz)   03/19/21 98 kg (216 lb)   02/26/21 98 kg (216 lb 0.8 oz)   01/14/21 98.4 kg (216 lb 14.9 oz)       For further HPI details, see assessment and plan.    Review of Systems   Constitutional: Negative for chills and fever.   HENT: Negative for trouble swallowing.    Respiratory: Positive for cough. Negative for shortness of breath.    Cardiovascular: Negative  for chest pain and palpitations.   Genitourinary: Negative for difficulty urinating.   Musculoskeletal: Negative for gait problem.   Neurological: Negative for dizziness.       Objective:      Vitals:    01/18/22 1011   BP: 133/76   Pulse: 109   Resp: 18   Temp: 100 °F (37.8 °C)       Physical Exam  Constitutional:       General: She is not in acute distress.     Appearance: She is not ill-appearing.   Cardiovascular:      Rate and Rhythm: Normal rate and regular rhythm.   Pulmonary:      Effort: Pulmonary effort is normal. No respiratory distress.   Skin:     Coloration: Skin is not pale.   Neurological:      General: No focal deficit present.      Mental Status: She is alert.   Psychiatric:         Mood and Affect: Mood normal.         Behavior: Behavior normal.         Assessment:       1. URTI (acute upper respiratory infection)    2. History of CVA with residual deficit    3. Hypertension, unspecified type    4. Encounter for screening mammogram for malignant neoplasm of breast    5. Class 3 severe obesity with body mass index (BMI) of 40.0 to 44.9 in adult, unspecified obesity type, unspecified whether serious comorbidity present    6. Hemiparesis affecting right side as late effect of cerebrovascular accident (CVA)    7. Atherosclerosis of aorta    8. COVID-19        Plan:   URTI (acute upper respiratory infection)  -     POCT COVID-19 Rapid Screening  -     Ambulatory referral/consult to [facility] Infusion; Future; Expected date: 01/19/2022    History of CVA with residual deficit  -     CBC Auto Differential; Future; Expected date: 01/18/2022  -     Comprehensive Metabolic Panel; Future; Expected date: 01/18/2022  -     Hemoglobin A1C; Future; Expected date: 01/18/2022  -     Lipid Panel; Future; Expected date: 01/18/2022  -     TSH; Future; Expected date: 01/18/2022  -     clopidogreL (PLAVIX) 75 mg tablet; Take 1 tablet (75 mg total) by mouth once daily.  Dispense: 90 tablet; Refill: 1    Hypertension,  unspecified type  -     CBC Auto Differential; Future; Expected date: 01/18/2022  -     Comprehensive Metabolic Panel; Future; Expected date: 01/18/2022  -     Hemoglobin A1C; Future; Expected date: 01/18/2022  -     Lipid Panel; Future; Expected date: 01/18/2022  -     TSH; Future; Expected date: 01/18/2022  -     amLODIPine (NORVASC) 10 MG tablet; Take 1 tablet (10 mg total) by mouth once daily.  Dispense: 90 tablet; Refill: 1  -     losartan (COZAAR) 100 MG tablet; Take 1 tablet (100 mg total) by mouth once daily.  Dispense: 90 tablet; Refill: 1    Encounter for screening mammogram for malignant neoplasm of breast  -     Mammo Digital Screening Bilat w/ Yusef; Future; Expected date: 01/18/2022    Class 3 severe obesity with body mass index (BMI) of 40.0 to 44.9 in adult, unspecified obesity type, unspecified whether serious comorbidity present    Hemiparesis affecting right side as late effect of cerebrovascular accident (CVA)    Atherosclerosis of aorta    COVID-19  -     Ambulatory referral/consult to [facility] Infusion; Future; Expected date: 01/19/2022    Other orders  -     atorvastatin (LIPITOR) 40 MG tablet; Take 1 tablet (40 mg total) by mouth once daily.  Dispense: 90 tablet; Refill: 1        URTI  Coughing a little bit  A lit bit of chest congestion  No fever  No chills  No sob  Will test for covid  suspect viral  montior for worsentin  F/u if so      HTN  Not controlled  Losartan 100   Amlodipine 10mg  No changes given her average per digital htn is 133/76      H/o  Stroke  H/o atherosclerosis  On statin  On plavix    No bleeding  No myalgia    Obesity  Wt Readings from Last 5 Encounters:   01/18/22 104.6 kg (230 lb 9.6 oz)   07/15/21 99.7 kg (219 lb 12.8 oz)   03/19/21 98 kg (216 lb)   02/26/21 98 kg (216 lb 0.8 oz)   01/14/21 98.4 kg (216 lb 14.9 oz)     Noted weight gain  Emphasized need for loss.    mammo will be due soon  Will order to get done    Labs needed    Addendum:  Patient test positive for  covid  Given risk monoclonal ab infusion ordered  Discussed quarantine.    Will do labs once quarantine ended.

## 2022-01-24 ENCOUNTER — PATIENT OUTREACH (OUTPATIENT)
Dept: ADMINISTRATIVE | Facility: HOSPITAL | Age: 72
End: 2022-01-24
Payer: MEDICARE

## 2022-01-24 ENCOUNTER — PATIENT MESSAGE (OUTPATIENT)
Dept: INFECTIOUS DISEASES | Facility: HOSPITAL | Age: 72
End: 2022-01-24
Payer: MEDICARE

## 2022-02-02 ENCOUNTER — HOSPITAL ENCOUNTER (EMERGENCY)
Facility: HOSPITAL | Age: 72
Discharge: HOME | End: 2022-02-02
Attending: EMERGENCY MEDICINE
Payer: MEDICARE

## 2022-02-02 VITALS
HEART RATE: 94 BPM | BODY MASS INDEX: 41.93 KG/M2 | TEMPERATURE: 99 F | RESPIRATION RATE: 20 BRPM | SYSTOLIC BLOOD PRESSURE: 182 MMHG | OXYGEN SATURATION: 97 % | HEIGHT: 62 IN | WEIGHT: 227.88 LBS | DIASTOLIC BLOOD PRESSURE: 80 MMHG

## 2022-02-02 DIAGNOSIS — J30.9 ALLERGIC RHINITIS, UNSPECIFIED SEASONALITY, UNSPECIFIED TRIGGER: ICD-10-CM

## 2022-02-02 DIAGNOSIS — M25.562 ACUTE PAIN OF LEFT KNEE: Primary | ICD-10-CM

## 2022-02-02 PROCEDURE — 99284 EMERGENCY DEPT VISIT MOD MDM: CPT | Mod: HCNC

## 2022-02-02 RX ORDER — DICLOFENAC SODIUM 50 MG/1
50 TABLET, DELAYED RELEASE ORAL 2 TIMES DAILY PRN
Qty: 14 TABLET | Refills: 0 | Status: SHIPPED | OUTPATIENT
Start: 2022-02-02 | End: 2022-07-18

## 2022-02-02 RX ORDER — TRAMADOL HYDROCHLORIDE 50 MG/1
50 TABLET ORAL EVERY 6 HOURS PRN
Qty: 12 TABLET | Refills: 0 | Status: SHIPPED | OUTPATIENT
Start: 2022-02-02 | End: 2022-07-18

## 2022-02-02 NOTE — TELEPHONE ENCOUNTER
No new care gaps identified.  Powered by [employer]. Reference number: [id number].   2/02/2022 4:37:29 PM CST

## 2022-02-09 ENCOUNTER — TELEPHONE (OUTPATIENT)
Dept: SPORTS MEDICINE | Facility: CLINIC | Age: 72
End: 2022-02-09
Payer: MEDICARE

## 2022-02-09 DIAGNOSIS — M25.562 PAIN IN LATERAL PORTION OF LEFT KNEE: ICD-10-CM

## 2022-02-09 DIAGNOSIS — M25.562 LEFT KNEE PAIN, UNSPECIFIED CHRONICITY: Primary | ICD-10-CM

## 2022-02-09 RX ORDER — FLUTICASONE PROPIONATE 50 MCG
2 SPRAY, SUSPENSION (ML) NASAL DAILY
Qty: 48 ML | Refills: 3 | Status: SHIPPED | OUTPATIENT
Start: 2022-02-09 | End: 2023-01-18 | Stop reason: SDUPTHER

## 2022-02-09 NOTE — TELEPHONE ENCOUNTER
LVM confirming patient's appointment and laterality for 2/10/2022.  Also asked patient to arrive 30 minutes early for xray.

## 2022-02-09 NOTE — TELEPHONE ENCOUNTER
Refill Authorization Note   Tracy Najera  is requesting a refill authorization.  Brief Assessment and Rationale for Refill:  Approve     Medication Therapy Plan:       Medication Reconciliation Completed: No   Comments:   --->Care Gap information included below if applicable.   Orders Placed This Encounter    fluticasone propionate (FLONASE) 50 mcg/actuation nasal spray      Requested Prescriptions   Signed Prescriptions Disp Refills    fluticasone propionate (FLONASE) 50 mcg/actuation nasal spray 48 mL 3     Si SPRAYS (100 MCG TOTAL) BY EACH NOSTRIL ROUTE ONCE DAILY.       Ear, Nose, and Throat: Nasal Preparations - Corticosteroids Passed - 2022  4:36 PM        Passed - Patient is at least 18 years old        Passed - Valid encounter within last 15 months     Recent Visits  Date Type Provider Dept   22 Office Visit Mike Oakes MD [facility]   07/15/21 Office Visit Mike Oakes MD [facility]   21 Office Visit Mike Oakes MD [facility]   20 Office Visit Mike Oakes MD [facility]   20 Office Visit Mike Oakes MD [facility]   Showing recent visits within past 720 days and meeting all other requirements  Future Appointments  No visits were found meeting these conditions.  Showing future appointments within next 150 days and meeting all other requirements      Future Appointments              Tomorrow [facility] XR2 The [address], [facility]    Tomorrow [facility]    In 2 weeks LABORATORY, [facility], [facility]    In 2 weeks ON MAMMO2 [facility], [facility]    In 5 months MD FABIAN [facility],  Medical C                    Appointments  past 12m or future 3m with PCP    Date Provider   Last Visit   2022 Mike Oakes MD   Next Visit   2022 Mike Oakes MD   ED visits in past 90 days: 0      Note composed:4:40 PM 02/09/2022

## 2022-02-10 ENCOUNTER — OFFICE VISIT (OUTPATIENT)
Dept: SPORTS MEDICINE | Facility: CLINIC | Age: 72
End: 2022-02-10
Payer: MEDICARE

## 2022-02-10 ENCOUNTER — HOSPITAL ENCOUNTER (OUTPATIENT)
Dept: RADIOLOGY | Facility: HOSPITAL | Age: 72
Discharge: HOME | End: 2022-02-10
Attending: PHYSICAL MEDICINE & REHABILITATION
Payer: MEDICARE

## 2022-02-10 VITALS — HEIGHT: 62 IN | WEIGHT: 227 LBS | BODY MASS INDEX: 41.77 KG/M2

## 2022-02-10 DIAGNOSIS — M25.562 LEFT KNEE PAIN, UNSPECIFIED CHRONICITY: ICD-10-CM

## 2022-02-10 DIAGNOSIS — M25.562 CHRONIC PAIN OF LEFT KNEE: Primary | ICD-10-CM

## 2022-02-10 DIAGNOSIS — M17.12 PRIMARY OSTEOARTHRITIS OF LEFT KNEE: ICD-10-CM

## 2022-02-10 DIAGNOSIS — G89.29 CHRONIC PAIN OF LEFT KNEE: Primary | ICD-10-CM

## 2022-02-10 PROCEDURE — 3008F BODY MASS INDEX DOCD: CPT | Mod: HCNC,CPTII,S$GLB, | Performed by: PHYSICAL MEDICINE & REHABILITATION

## 2022-02-10 PROCEDURE — 99204 OFFICE O/P NEW MOD 45 MIN: CPT | Mod: HCNC,S$GLB,, | Performed by: PHYSICAL MEDICINE & REHABILITATION

## 2022-02-10 PROCEDURE — 73562 X-RAY EXAM OF KNEE 3: CPT | Mod: 26,HCNC,RT, | Performed by: RADIOLOGY

## 2022-02-10 PROCEDURE — 99999 PR PBB SHADOW E&M-EST. PATIENT-LVL III: ICD-10-PCS | Mod: PBBFAC,HCNC,, | Performed by: PHYSICAL MEDICINE & REHABILITATION

## 2022-02-10 PROCEDURE — 73562 XR KNEE ORTHO LEFT WITH FLEXION: ICD-10-PCS | Mod: 26,HCNC,RT, | Performed by: RADIOLOGY

## 2022-02-10 PROCEDURE — 1159F MED LIST DOCD IN RCRD: CPT | Mod: HCNC,CPTII,S$GLB, | Performed by: PHYSICAL MEDICINE & REHABILITATION

## 2022-02-10 PROCEDURE — 99999 PR PBB SHADOW E&M-EST. PATIENT-LVL III: CPT | Mod: PBBFAC,HCNC,, | Performed by: PHYSICAL MEDICINE & REHABILITATION

## 2022-02-10 PROCEDURE — 1159F PR MEDICATION LIST DOCUMENTED IN MEDICAL RECORD: ICD-10-PCS | Mod: HCNC,CPTII,S$GLB, | Performed by: PHYSICAL MEDICINE & REHABILITATION

## 2022-02-10 PROCEDURE — 1101F PR PT FALLS ASSESS DOC 0-1 FALLS W/OUT INJ PAST YR: ICD-10-PCS | Mod: HCNC,CPTII,S$GLB, | Performed by: PHYSICAL MEDICINE & REHABILITATION

## 2022-02-10 PROCEDURE — 1160F PR REVIEW ALL MEDS BY PRESCRIBER/CLIN PHARMACIST DOCUMENTED: ICD-10-PCS | Mod: HCNC,CPTII,S$GLB, | Performed by: PHYSICAL MEDICINE & REHABILITATION

## 2022-02-10 PROCEDURE — 1125F PR PAIN SEVERITY QUANTIFIED, PAIN PRESENT: ICD-10-PCS | Mod: HCNC,CPTII,S$GLB, | Performed by: PHYSICAL MEDICINE & REHABILITATION

## 2022-02-10 PROCEDURE — 1101F PT FALLS ASSESS-DOCD LE1/YR: CPT | Mod: HCNC,CPTII,S$GLB, | Performed by: PHYSICAL MEDICINE & REHABILITATION

## 2022-02-10 PROCEDURE — 3288F PR FALLS RISK ASSESSMENT DOCUMENTED: ICD-10-PCS | Mod: HCNC,CPTII,S$GLB, | Performed by: PHYSICAL MEDICINE & REHABILITATION

## 2022-02-10 PROCEDURE — 4010F PR ACE/ARB THEARPY RXD/TAKEN: ICD-10-PCS | Mod: HCNC,CPTII,S$GLB, | Performed by: PHYSICAL MEDICINE & REHABILITATION

## 2022-02-10 PROCEDURE — 3008F PR BODY MASS INDEX (BMI) DOCUMENTED: ICD-10-PCS | Mod: HCNC,CPTII,S$GLB, | Performed by: PHYSICAL MEDICINE & REHABILITATION

## 2022-02-10 PROCEDURE — 1125F AMNT PAIN NOTED PAIN PRSNT: CPT | Mod: HCNC,CPTII,S$GLB, | Performed by: PHYSICAL MEDICINE & REHABILITATION

## 2022-02-10 PROCEDURE — 3288F FALL RISK ASSESSMENT DOCD: CPT | Mod: HCNC,CPTII,S$GLB, | Performed by: PHYSICAL MEDICINE & REHABILITATION

## 2022-02-10 PROCEDURE — 1160F RVW MEDS BY RX/DR IN RCRD: CPT | Mod: HCNC,CPTII,S$GLB, | Performed by: PHYSICAL MEDICINE & REHABILITATION

## 2022-02-10 PROCEDURE — 99204 PR OFFICE/OUTPT VISIT, NEW, LEVL IV, 45-59 MIN: ICD-10-PCS | Mod: HCNC,S$GLB,, | Performed by: PHYSICAL MEDICINE & REHABILITATION

## 2022-02-10 PROCEDURE — 4010F ACE/ARB THERAPY RXD/TAKEN: CPT | Mod: HCNC,CPTII,S$GLB, | Performed by: PHYSICAL MEDICINE & REHABILITATION

## 2022-02-10 PROCEDURE — 73562 X-RAY EXAM OF KNEE 3: CPT | Mod: 59,TC,HCNC,RT

## 2022-02-10 PROCEDURE — 73564 X-RAY EXAM KNEE 4 OR MORE: CPT | Mod: 26,HCNC,LT, | Performed by: RADIOLOGY

## 2022-02-10 PROCEDURE — 73564 XR KNEE ORTHO LEFT WITH FLEXION: ICD-10-PCS | Mod: 26,HCNC,LT, | Performed by: RADIOLOGY

## 2022-02-10 RX ORDER — DICLOFENAC SODIUM 10 MG/G
2 GEL TOPICAL 4 TIMES DAILY
Qty: 100 G | Refills: 2 | Status: SHIPPED | OUTPATIENT
Start: 2022-02-10 | End: 2022-08-31

## 2022-02-10 NOTE — PATIENT INSTRUCTIONS
Assessment:  Tracy Najera is a 71 y.o. female   Chief Complaint   Patient presents with    Left Knee - Pain       Chronic pain of left knee  -     diclofenac sodium (VOLTAREN) 1 % Gel; Apply 2 g topically 4 (four) times daily.  Dispense: 100 g; Refill: 2  -     Ambulatory referral/consult to Physical/Occupational Therapy; Future; Expected date: 02/17/2022    Primary osteoarthritis of left knee  -     diclofenac sodium (VOLTAREN) 1 % Gel; Apply 2 g topically 4 (four) times daily.  Dispense: 100 g; Refill: 2  -     Ambulatory referral/consult to Physical/Occupational Therapy; Future; Expected date: 02/17/2022        Plan:   We personally reviewed her imaging today in clinic and agree with the radiologist's report.  Recommend trying Voltaren Gel, or generic diclofenac gel, over the counter, and following the directions on the package (4 times daily, give at least a week to see if it helps. Use it for 3 weeks on / 1 week off).   Remember that you may need to use it consistently for several days before seeing results.       Research supports the use of these things for helping joint / arthritis pain:     Collagen supplements -  collagen info I like [place] Hydrolyzed Collagen: on Amazon     High Dose Fish Oil / Krill oil - I like Nordic Naturals: Nordic site  krill oil info     Turmeric / Curcumin - curcumin info I believe treatment dose is around 500 mg once or twice daily. No particular brand recommendation.     SAMe - SAMe info (no brand recommendation)         PT at [facility].   Hold off on injection for now.       Follow-up: 4 - 6 weeks or sooner if there are any problems between now and then.    Thank you for choosing [facility] and Dr. Marybeth Odom for your orthopedic & sports medicine care. It is our goal to provide you with exceptional care that will help keep you healthy, active, and get you back in the game.    Please do not hesitate to reach out to us via email, phone, or  [place] with any questions, concerns, or feedback.    If you felt that you received exemplary care today, please consider leaving us feedback on Healthgrades at:  [email or web address]     If you are experiencing pain/discomfort ,or have questions after 5pm and would like to be connected to the [facility] on-call team, please call this number and specify which Sports Medicine provider is treating you: [phone]         Patient Education       Osteoarthritis   The Basics   Written by the doctors and editors at [facility]   What is osteoarthritis? -- Arthritis is a general term that means inflammation of the joints. There are dozens of types of arthritis. Osteoarthritis is the most common type. It often comes with age, and it often affects the hands, knees, and hips.  The place where 2 bones meet is normally covered with a rubbery material called cartilage. This material allows the bones to slide over each other without causing pain. When osteoarthritis sets in, the cartilage begins to break down. As it wears away, the bones in the joint start to rub against each other (figure 1). This can cause pain, stiffness, and swelling (table 1).  What can I do to feel better? -- To ease your symptoms, you can:  · Rest for several minutes when your pain is at its worst - But don't rest too long. That can make your muscles weak and your pain worse.  · Lose weight (if you are overweight) - Being overweight puts extra strain on your joints. Your doctor or nurse can talk to you about healthy ways to lose weight.  · Get plenty of physical activity - Having strong muscles takes some of the strain off of your joints. It can reduce your pain in the long run, even if it hurts to do at first. There are lots of different ways to get physical activity. Your doctor or nurse can help you come up with an exercise routine that works for you. They might also suggest you  "work with a physical therapist (exercise expert).   · Use "assistive devices" if your doctor recommends them - These devices can help keep your joints stable or take weight off them. Examples include shoe inserts, splints, canes, and walkers.  · Use hot or cold packs - Some people find that heat or cold helps relieves pain for a short time.  · Learn about arthritis - Learning about your condition allows you to work with your doctor or nurse to find the things that you are most helpful for you. It can also help you better understand what to expect with your symptoms and treatment.  Can herbs, vitamins, or supplements help? -- There is no strong evidence that supplements of any sort work on arthritis symptoms. That's true even for glucosamine and chondroitin, which are supplements people seem to think help with arthritis. If you want to try any supplements or herbs, check with your doctor or nurse before taking them.  Are there medicines I can take? -- Usually, doctors suggest trying things like physical activity, weight loss, and assistive devices first. If these do not help with pain, they might recommend medicine. Options include medicines that come as pills as well as creams and gels that go on the skin.  In some situations, doctors might suggest shots that go into the joint to relieve pain. But these are not usually used as a main treatment, because they only work for a few weeks.  What about surgery? -- When other treatments do not help enough, some people with osteoarthritis get surgery. For instance, some people have surgery to replace a knee or a hip. Surgeons are working on other types of surgery for arthritis, too.  Try different things until you find what works -- The symptoms of osteoarthritis can be hard to handle. But don't lose hope. You might need to try different combinations of medicines, exercises, and devices to find the approach that works for you. But most people do find ways to go back to doing " "many of things they like to do.  All topics are updated as new evidence becomes available and our peer review process is complete.  This topic retrieved from [employer] on: Sep 21, 2021.  Topic 26167 Version 15.0  Release: 29.4.2 - C29.263  © 2021 UpToDate, Inc. and/or its affiliates. All rights reserved.  figure 1: Knee osteoarthritis     This drawing shows a normal knee joint next to a knee joint with osteoarthritis (OA). In the OA joint, the cartilage covering the ends of the bones roughens and becomes thin, while the bone underneath the cartilage grows thicker. Bony growths called "osteophytes" can form. The space between the bones also becomes narrower.  Graphic 801048 Version 2.0    table 1: Effects of osteoarthritis  Age when symptoms start    Usually after 40   Commonly affected body parts    Neck and lower back   Joint at the base of the thumb   Knuckles in the middle and near the tip of the fingers   Hip   Knee   Ankle joint   Knuckle at the base of the big toe   Less commonly affected body parts    Shoulder   Wrist   Elbow   Knuckles at the base of the fingers   Symptoms    Pain   Stiffness   Crackling or clicking sounds in the joints   Extra bone growth (for example, knuckles that look swollen or knobby)   Decreased range of motion   Problems with the alignment of certain joints   Tenderness to the touch   Graphic 53572 Version 2.0  Consumer Information Use and Disclaimer   This information is not specific medical advice and does not replace information you receive from your health care provider. This is only a brief summary of general information. It does NOT include all information about conditions, illnesses, injuries, tests, procedures, treatments, therapies, discharge instructions or life-style choices that may apply to you. You must talk with your health care provider for complete information about your health and treatment options. This information should not be used to decide whether or not to " accept your health care provider's advice, instructions or recommendations. Only your health care provider has the knowledge and training to provide advice that is right for you. The use of this information is governed by the [employer] End User License Agreement, available at https://www.[email or web address]/solutions/[employer]/about/uyen.The use of [employer] content is governed by the [employer] Terms of Use. ©2021 UpToDate, Inc. All rights reserved.  Copyright   © 2021 UpToDate, Inc. and/or its affiliates. All rights reserved.

## 2022-02-10 NOTE — PROGRESS NOTES
SPORTS MEDICINE / PM&R New Patient Visit :    Referring Physician: Self, Aaareferral    Chief Complaint   Patient presents with    Left Knee - Pain       HPI: This is a 71 y.o.  female being seen in clinic today for evaluation of Pain of the Left Knee      The problem began 2-3 months ago.  She feels intermittent sharp pain when walking, and clicking and stiffnesson her left knee . The symptoms show no change. She has tried Diclofenac, Tramadol, heat, tylenol and rest with improvement. She has not tried therapy.    History obtained from patient.    Past family, medical, social, surgical history, and vital signs reviewed in chart.    General    Nursing note and vitals reviewed.  Constitutional: She is oriented to person, place, and time. She appears well-developed and well-nourished.   HENT:   Head: Normocephalic and atraumatic.   Eyes: Conjunctivae and EOM are normal. Pupils are equal, round, and reactive to light.   Neck: Neck supple.   Cardiovascular: Intact distal pulses.    Pulmonary/Chest: Effort normal. No respiratory distress.   Abdominal: She exhibits no distension.   Neurological: She is alert and oriented to person, place, and time.   Psychiatric: She has a normal mood and affect.     General Musculoskeletal Exam   Gait: antalgic       Right Knee Exam   Right knee exam is normal.    Inspection   Erythema: absent  Swelling: absent  Effusion: absent    Range of Motion   The patient has normal right knee ROM.    Tests   Meniscus   Roger:  Medial - negative Lateral - negative  Ligament Examination Lachman: normal (-1 to 2mm) PCL-Posterior Drawer: normal (0 to 2mm)     MCL - Valgus: normal (0 to 2mm)  LCL - Varus: normal  Patella   Patellar apprehension: negative  Patellar Tracking: normal    Other   Sensation: normal    Left Knee Exam     Inspection   Erythema: absent  Swelling: absent  Effusion: absent  Deformity: present (valgus)    Tenderness   The patient tender to palpation of the medial joint line  and lateral joint line.    Crepitus   The patient has crepitus of the lateral joint line.    Range of Motion   The patient has normal left knee ROM.    Tests   Meniscus   Roger:  Medial - negative Lateral - negative  Stability Lachman: normal (-1 to 2mm) PCL-Posterior Drawer: normal (0 to 2mm)  MCL - Valgus: normal (0 to 2mm)  LCL - Varus: normal (0 to 2mm)  Patella   Patellar apprehension: negative  Patellar Tracking: normal    Other   Meniscal Cyst: absent  Popliteal (Baker's) Cyst: absent  Sensation: normal    Right Hip Exam   Right hip exam is normal.     Tests   Pain w/ forced internal rotation (PEE): absent  Left Hip Exam   Left hip exam is normal.    Tests   Pain w/ forced internal rotation (PEE): absent          Muscle Strength   Right Lower Extremity   Hip Abduction: 4/5   Hip Adduction: 5/5   Hip Flexion: 5/5   Quadriceps:  5/5   Hamstrin/5   Left Lower Extremity   Hip Abduction: 4/5   Hip Adduction: 5/5   Hip Flexion: 5/5   Quadriceps:  5/5 and 4/5   Hamstrin/5     Reflexes     Left Side  Achilles:  0  Quadriceps:  0    Right Side   Achilles:  0  Quadriceps:  0    Vascular Exam     Right Pulses  Dorsalis Pedis:      2+          Left Pulses  Dorsalis Pedis:      2+              X-ray Knee Ortho Left with Flexion  Narrative: EXAMINATION:  XR KNEE ORTHO LEFT WITH FLEXION    CLINICAL HISTORY:  . Pain in left knee    TECHNIQUE:  AP standing of both knees, PA flexion standing views of both knees, and Merchant views of both knees were performed. A lateral of the left knee was also performed.    COMPARISON:  None    FINDINGS:  A small joint effusion is suspected on the left.  No acute fractures or dislocations visualized.  Moderate patellofemoral, moderate lateral, and mild medial tricompartmental osteoarthritis noted on the left.  There is also mild joint space narrowing in the right medial compartment.  Impression: As above.    Electronically signed by: Jayce Juarez  MD  Date:    02/10/2022  Time:    08:00         Patient Instructions     Assessment:  Tracy Najera is a 71 y.o. female   Chief Complaint   Patient presents with    Left Knee - Pain       Chronic pain of left knee        Plan:   We personally reviewed her imaging today in clinic and agree with the radiologist's report.  Recommend trying Voltaren Gel, or generic diclofenac gel, over the counter, and following the directions on the package (4 times daily, give at least a week to see if it helps. Use it for 3 weeks on / 1 week off).   Remember that you may need to use it consistently for several days before seeing results.       Research supports the use of these things for helping joint / arthritis pain:     Collagen supplements -  collagen info I like [place] Hydrolyzed Collagen: on Amazon     High Dose Fish Oil / Krill oil - I like Nordic Naturals: Nordic site  krill oil info     Turmeric / Curcumin - curcumin info I believe treatment dose is around 500 mg once or twice daily. No particular brand recommendation.     SAMe - SAMe info (no brand recommendation)         PT at [facility].   Hold off on injection for now.       Follow-up: 4 - 6 weeks or sooner if there are any problems between now and then.    Thank you for choosing [facility] and Dr. Marybeth Odom for your orthopedic & sports medicine care. It is our goal to provide you with exceptional care that will help keep you healthy, active, and get you back in the game.    Please do not hesitate to reach out to us via email, phone, or MyChart with any questions, concerns, or feedback.    If you felt that you received exemplary care today, please consider leaving us feedback on Healthgrades at:  [email or web address]     If you are experiencing pain/discomfort ,or have questions after 5pm and would like to be connected to the [facility] on-call team, please call  "this number and specify which Sports Medicine provider is treating you: [phone]         Patient Education       Osteoarthritis   The Basics   Written by the doctors and editors at [facility]   What is osteoarthritis? -- Arthritis is a general term that means inflammation of the joints. There are dozens of types of arthritis. Osteoarthritis is the most common type. It often comes with age, and it often affects the hands, knees, and hips.  The place where 2 bones meet is normally covered with a rubbery material called cartilage. This material allows the bones to slide over each other without causing pain. When osteoarthritis sets in, the cartilage begins to break down. As it wears away, the bones in the joint start to rub against each other (figure 1). This can cause pain, stiffness, and swelling (table 1).  What can I do to feel better? -- To ease your symptoms, you can:  · Rest for several minutes when your pain is at its worst - But don't rest too long. That can make your muscles weak and your pain worse.  · Lose weight (if you are overweight) - Being overweight puts extra strain on your joints. Your doctor or nurse can talk to you about healthy ways to lose weight.  · Get plenty of physical activity - Having strong muscles takes some of the strain off of your joints. It can reduce your pain in the long run, even if it hurts to do at first. There are lots of different ways to get physical activity. Your doctor or nurse can help you come up with an exercise routine that works for you. They might also suggest you work with a physical therapist (exercise expert).   · Use "assistive devices" if your doctor recommends them - These devices can help keep your joints stable or take weight off them. Examples include shoe inserts, splints, canes, and walkers.  · Use hot or cold packs - Some people find that heat or cold helps relieves pain for a short time.  · Learn about arthritis - Learning about your condition allows " you to work with your doctor or nurse to find the things that you are most helpful for you. It can also help you better understand what to expect with your symptoms and treatment.  Can herbs, vitamins, or supplements help? -- There is no strong evidence that supplements of any sort work on arthritis symptoms. That's true even for glucosamine and chondroitin, which are supplements people seem to think help with arthritis. If you want to try any supplements or herbs, check with your doctor or nurse before taking them.  Are there medicines I can take? -- Usually, doctors suggest trying things like physical activity, weight loss, and assistive devices first. If these do not help with pain, they might recommend medicine. Options include medicines that come as pills as well as creams and gels that go on the skin.  In some situations, doctors might suggest shots that go into the joint to relieve pain. But these are not usually used as a main treatment, because they only work for a few weeks.  What about surgery? -- When other treatments do not help enough, some people with osteoarthritis get surgery. For instance, some people have surgery to replace a knee or a hip. Surgeons are working on other types of surgery for arthritis, too.  Try different things until you find what works -- The symptoms of osteoarthritis can be hard to handle. But don't lose hope. You might need to try different combinations of medicines, exercises, and devices to find the approach that works for you. But most people do find ways to go back to doing many of things they like to do.  All topics are updated as new evidence becomes available and our peer review process is complete.  This topic retrieved from [employer] on: Sep 21, 2021.  Topic 26631 Version 15.0  Release: 29.4.2 - C29.263  © 2021 UpToDate, Inc. and/or its affiliates. All rights reserved.  figure 1: Knee osteoarthritis     This drawing shows a normal knee joint next to a knee joint with  "osteoarthritis (OA). In the OA joint, the cartilage covering the ends of the bones roughens and becomes thin, while the bone underneath the cartilage grows thicker. Bony growths called "osteophytes" can form. The space between the bones also becomes narrower.  Graphic 867222 Version 2.0    table 1: Effects of osteoarthritis  Age when symptoms start    Usually after 40   Commonly affected body parts    Neck and lower back   Joint at the base of the thumb   Knuckles in the middle and near the tip of the fingers   Hip   Knee   Ankle joint   Knuckle at the base of the big toe   Less commonly affected body parts    Shoulder   Wrist   Elbow   Knuckles at the base of the fingers   Symptoms    Pain   Stiffness   Crackling or clicking sounds in the joints   Extra bone growth (for example, knuckles that look swollen or knobby)   Decreased range of motion   Problems with the alignment of certain joints   Tenderness to the touch   Graphic 60764 Version 2.0  Consumer Information Use and Disclaimer   This information is not specific medical advice and does not replace information you receive from your health care provider. This is only a brief summary of general information. It does NOT include all information about conditions, illnesses, injuries, tests, procedures, treatments, therapies, discharge instructions or life-style choices that may apply to you. You must talk with your health care provider for complete information about your health and treatment options. This information should not be used to decide whether or not to accept your health care provider's advice, instructions or recommendations. Only your health care provider has the knowledge and training to provide advice that is right for you. The use of this information is governed by the [employer] End User License Agreement, available at https://www.[employer]/en/solutions/[employer]/about/uyen.The use of UpToDate content is governed by the [employer] Terms of Use. " ©2021 [employer] Inc. All rights reserved.  Copyright   © 2021 [employer]. and/or its affiliates. All rights reserved.        Disclaimer: This note was prepared using a voice recognition system and is likely to have sound alike errors within the text.     Marybeth Odom M.D.  Sports Medicine

## 2022-02-15 ENCOUNTER — CLINICAL SUPPORT (OUTPATIENT)
Dept: REHABILITATION | Facility: HOSPITAL | Age: 72
End: 2022-02-15
Attending: PHYSICAL MEDICINE & REHABILITATION
Payer: MEDICARE

## 2022-02-15 DIAGNOSIS — G89.29 CHRONIC PAIN OF LEFT KNEE: ICD-10-CM

## 2022-02-15 DIAGNOSIS — M17.12 PRIMARY OSTEOARTHRITIS OF LEFT KNEE: ICD-10-CM

## 2022-02-15 DIAGNOSIS — M25.562 CHRONIC PAIN OF LEFT KNEE: ICD-10-CM

## 2022-02-15 PROCEDURE — 97161 PT EVAL LOW COMPLEX 20 MIN: CPT | Mod: HCNC

## 2022-02-15 PROCEDURE — 97110 THERAPEUTIC EXERCISES: CPT | Mod: HCNC

## 2022-02-15 NOTE — PLAN OF CARE
[facility]   Physical Therapy Initial Evaluation     Date: 2/15/2022   Name: Tracy Najera  Clinic Number: [id number]    Therapy Diagnosis:   Encounter Diagnoses   Name Primary?    Chronic pain of left knee     Primary osteoarthritis of left knee      Physician: Marybeth Odom MD    Physician Orders: PT Eval and Treat   Medical Diagnosis from Referral: M25.562,G89.29 (ICD-10-CM) - Chronic pain of left knee M17.12 (ICD-10-CM) - Primary osteoarthritis of left knee     Evaluation Date: 2/15/2022  Authorization Period Expiration: 02/10/2023  Plan of Care Expiration: 03/18/2022  Progress Note Due: 10th visit  Visit # / Visits authorized: 1/ 1   FOTO: 1/3    Precautions: Standard     Time In: 1:15pm  Time Out: 2:00pm  Total Appointment Time (timed & untimed codes): 45 minutes      SUBJECTIVE     Date of onset: 3 months    History of current condition - Tracy reports: that she has been having left knee pain for a few months now.  She has trouble with standing up because of her left knee. She reports that walks with a limp because of the knee pain.     Falls: 0    Imaging, Xrays:   FINDINGS:  A small joint effusion is suspected on the left.  No acute fractures or dislocations visualized.  Moderate patellofemoral, moderate lateral, and mild medial tricompartmental osteoarthritis noted on the left.  There is also mild joint space narrowing in the right medial compartment.     Impression:     As above.    Prior Therapy: Yes for stroke 12-13 years ago  Social History:  lives with their spouse  Occupation: Not working  Prior Level of Function: WNL  Current Level of Function: Difficulty with getting up    Pain:  Current 5/10, worst 8/10,   Location: left knee   Description: Variable  Aggravating Factors: Standing up  Easing Factors: pain get's better after standing up    Patients goals: Pt would like to not have pain in her knee.      Medical History:   Past Medical History:   Diagnosis Date     Anticoagulant long-term use     Plavix    Arthritis     Bloody discharge from left nipple 6/1/2017    Cerebral artery occlusion with cerebral infarction 9/5/2013 2:35:44 PM    [facility] Historical - Cardiovascular: CVA (Cerebrovascular accident)-No Additional Notes    Complications affecting other specified body systems, hypertension 9/5/2013 2:32:20 PM    [facility] Historical - Cardiovascular: Hypertension-No Additional Notes    CVA (cerebral vascular accident) 2008    R hemiparesis, R hand contracture    HTN (hypertension)     Hyperlipidemia     Other and unspecified hyperlipidemia 9/5/2013 2:32:23 PM    [facility] Historical - Endocrine/Metabolic/Immune: Hyperlipidemia-No Additional Notes    Severe obesity (BMI 35.0-39.9) with comorbidity     Special screening for malignant neoplasms, colon 4/16/2019       Surgical History:   Tracy Najera  has a past surgical history that includes Tonsillectomy; Hysterectomy; Breast biopsy (Left, 2017); and Colonoscopy (N/A, 4/16/2019).    Medications:   Tracy has a current medication list which includes the following prescription(s): amlodipine, atorvastatin, clopidogrel, diclofenac, diclofenac sodium, fluticasone propionate, guaifenesin/dextromethorphan, losartan, tramadol, and vitamin d.    Allergies:   Review of patient's allergies indicates:  No Known Allergies       OBJECTIVE     Gait Observation:  Antalgic gait pattern, patient had a previous stroke that affected her R side.  She tends to have increased lateral trunk sway. No AD used.    Sensation: Intact to light touch B LE's, all dermatomes      Strength:       L/E MMT Right Left Pain/Dysfunction with Movement   Hip Flexion 4+/5 4+/5    Hip Extension 4-/5 4-/5    Gluteus medius 4-/5 4-/5    Gluteus urbano 4-/5 4-/5    Hip IR 4+/5 4+/5    Hip ER 4+/5 4+/5    Knee Flexion 4+/5 4+/5    Knee Extension 4+/5 4+/5    Ankle DF 4+/5 4+/5    Ankle PF 4+/5 4+/5    Ankle Inversion 4+/5 4+/5    Ankle Eversion  4+/5 4+/5          Range of Motion:     Knee Right Left Pain/Dysfunction with Movement   AROM      flexion  110  112    extension  0  9 Lacks terminal knee ext by 9 degrees in L knee        Hip Right Left Pain/Dysfunction with Movement   AROM      flexion  90  90    extension  5  5    abduction  15  15          Limitation/Restriction for FOTO Knee Survey    Therapist reviewed FOTO scores for Tracy Najera on 2/15/2022.   FOTO documents entered into [employer] - see Media section.    Limitation Score: 53%         TREATMENT     Total Treatment time (time-based codes) separate from Evaluation: 10 minutes      Tracy received the treatments listed below:      therapeutic exercises to develop strength, endurance, ROM, flexibility, posture and core stabilization for 10 minutes including:  Quad sets  Straight Leg Raise  LAQ    hot pack for -- minutes to --.    cold pack for -- minutes to --.      PATIENT EDUCATION AND HOME EXERCISES     Education provided:   - HEP and plan of care    Written Home Exercises Provided: yes. Exercises were reviewed and Tracy was able to demonstrate them prior to the end of the session.  Tracy demonstrated good  understanding of the education provided. See EMR under Patient Instructions for exercises provided during therapy sessions.    ASSESSMENT     Tracy is a 71 y.o. female referred to outpatient Physical Therapy with a medical diagnosis of Chronic L knee pain. Patient presents with decreased ROM in the L knee, decreased strength in the L knee and hip, difficulty with gait, and difficulty with sit to stands.     Patient prognosis is Good.   Patientt will benefit from skilled outpatient Physical Therapy to address the deficits stated above and in the chart below, provide patient /family education, and to maximize patientt's level of independence.     Plan of care discussed with patient: Yes  Patient's spiritual, cultural and educational needs considered and patient is agreeable to the plan of  care and goals as stated below:     Anticipated Barriers for therapy: none    Medical Necessity is demonstrated by the following  History  Co-morbidities and personal factors that may impact the plan of care Co-morbidities:   See above PMHx    Personal Factors:   no deficits     moderate   Examination  Body Structures and Functions, activity limitations and participation restrictions that may impact the plan of care Body Regions:   lower extremities    Body Systems:    ROM  strength  balance  gait    Participation Restrictions:   none    Activity limitations:   Learning and applying knowledge  no deficits    General Tasks and Commands  no deficits    Communication  no deficits    Mobility  lifting and carrying objects  walking    Self care  dressing    Domestic Life  doing house work (cleaning house, washing dishes, laundry)    Interactions/Relationships  no deficits    Life Areas  no deficits    Community and Social Life  recreation and leisure         moderate   Clinical Presentation stable and uncomplicated low   Decision Making/ Complexity Score: low     Goals:  Short Term Goals:    1. Pt will be 50% independent with HEP.     Long Term Goals: 4 weeks   1. Pt will be 100% independent with HEP.  2. Pt will be able to perform sit to stand without pain in her knee.  3. Pt will be able to perform light duties around her house without difficulty.  4. Pt will be able to get in/out of her car without difficulty.    PLAN   Plan of care Certification: 2/15/2022 to 03/18/2022.    Outpatient Physical Therapy 1 times weekly for 4 weeks to include the following interventions: Electrical Stimulation PRN, Gait Training, Manual Therapy, Moist Heat/ Ice, Neuromuscular Re-ed, Patient Education, Self Care, Therapeutic Activities, Therapeutic Exercise and FDN.     Sadia Webster, PT      I CERTIFY THE NEED FOR THESE SERVICES FURNISHED UNDER THIS PLAN OF TREATMENT AND WHILE UNDER MY CARE   Physician's comments:     Physician's  Signature: ___________________________________________________

## 2022-02-23 ENCOUNTER — CLINICAL SUPPORT (OUTPATIENT)
Dept: REHABILITATION | Facility: HOSPITAL | Age: 72
End: 2022-02-23
Payer: MEDICARE

## 2022-02-23 DIAGNOSIS — M25.562 CHRONIC PAIN OF LEFT KNEE: Primary | ICD-10-CM

## 2022-02-23 DIAGNOSIS — G89.29 CHRONIC PAIN OF LEFT KNEE: Primary | ICD-10-CM

## 2022-02-23 PROCEDURE — 97110 THERAPEUTIC EXERCISES: CPT | Mod: HCNC

## 2022-02-23 NOTE — PROGRESS NOTES
[facility]   Physical Therapy Treatment Note     Name: Tracy Najera  Clinic Number: [id number]    Therapy Diagnosis:   Encounter Diagnosis   Name Primary?    Chronic pain of left knee Yes     Physician: Marybeth Odom MD    Visit Date: 2/23/2022  Physician Orders: PT Eval and Treat   Medical Diagnosis from Referral: M25.562,G89.29 (ICD-10-CM) - Chronic pain of left knee M17.12 (ICD-10-CM) - Primary osteoarthritis of left knee      Evaluation Date: 2/15/2022  Authorization Period Expiration: 12/31/2022  Plan of Care Expiration: 03/18/2022  Progress Note Due: 10th visit  Visit # / Visits authorized: 1/20 +Eval  FOTO: 1/3     Precautions: Standard       PTA Visit #: --/5     Time In: 8:45am  Time Out: 9:40am  Total Billable Time: 45 minutes    SUBJECTIVE     Pt reports: that her knee is still bothering.  She was compliant with home exercise program.  Response to previous treatment: knee continues with pain  Functional change:  Nothing noted    Pain: 5/10  Location: left knee      OBJECTIVE     Objective Measures updated at progress report unless specified.     Treatment     Tracy received the treatments listed below:      therapeutic exercises to develop strength, endurance, ROM and flexibility for 45 minutes including:  Bike 6'  Shuttle DL 5B 2'  Standing heel raises 20x  Step taps w/RLE only 10x  LAQ 2 x 10  Quad  20x  SAQ 3 x 10  Straight Leg Raise 3 x 10  LAQ 2 x 10  Knee squeeze 2 x 10    manual therapy techniques: Joint mobilizations, Manual traction and Soft tissue Mobilization were applied to the: -- for -- minutes, including:  --    neuromuscular re-education activities to improve: Balance, Coordination, Proprioception and Posture for -- minutes. The following activities were included:  --    hot pack for 10 minutes to L knee.    cold pack for -- minutes to --.        Patient Education and Home Exercises     Home Exercises Provided and Patient Education Provided     Education  provided:   - HEP and plan of care    Written Home Exercises Provided: Patient instructed to cont prior HEP. Exercises were reviewed and Tracy was able to demonstrate them prior to the end of the session.  Tracy demonstrated fair  understanding of the education provided. See EMR under Patient Instructions for exercises provided during therapy sessions    ASSESSMENT     Tracy presents with continued L knee pain.  She is lacking terminal knee extension in the L Knee especially with gait. She needs cuing to keep proper form during her exercises.  She is lacking slight stability and strength in the RLE due to stroke.  She has poor balance over the LLE vs the RLE.  She needs continued work on strengthening and balance training to the LLE.      Tracy Is progressing well towards her goals.   Pt prognosis is Good.     Pt will continue to benefit from skilled outpatient physical therapy to address the deficits listed in the problem list box on initial evaluation, provide pt/family education and to maximize pt's level of independence in the home and community environment.     Pt's spiritual, cultural and educational needs considered and pt agreeable to plan of care and goals.     Anticipated barriers to physical therapy: none    Short Term Goals:    1. Pt will be 50% independent with HEP.  Progressing     Long Term Goals: 4 weeks   1. Pt will be 100% independent with HEP. Progressing  2. Pt will be able to perform sit to stand without pain in her knee. Progressing  3. Pt will be able to perform light duties around her house without difficulty. Progressing  4. Pt will be able to get in/out of her car without difficulty. Progressing     PLAN   Plan of care Certification: 2/15/2022 to 03/18/2022.     Outpatient Physical Therapy weekly for 4 weeks to include the following interventions: Electrical Stimulation PRN, Gait Training, Manual Therapy, Moist Heat/ Ice, Neuromuscular Re-ed, Patient Education, Self Care, Therapeutic  Activities, Therapeutic Exercise and FDN.       Sadia Webster, PT

## 2022-02-28 ENCOUNTER — HOSPITAL ENCOUNTER (OUTPATIENT)
Dept: RADIOLOGY | Facility: HOSPITAL | Age: 72
Discharge: HOME | End: 2022-02-28
Attending: FAMILY MEDICINE
Payer: MEDICARE

## 2022-02-28 DIAGNOSIS — Z12.31 ENCOUNTER FOR SCREENING MAMMOGRAM FOR MALIGNANT NEOPLASM OF BREAST: ICD-10-CM

## 2022-02-28 PROCEDURE — 77067 SCR MAMMO BI INCL CAD: CPT | Mod: 26,,, | Performed by: RADIOLOGY

## 2022-02-28 PROCEDURE — 77063 BREAST TOMOSYNTHESIS BI: CPT | Mod: 26,,, | Performed by: RADIOLOGY

## 2022-02-28 PROCEDURE — 77067 MAMMO DIGITAL SCREENING BILAT WITH TOMO: ICD-10-PCS | Mod: 26,,, | Performed by: RADIOLOGY

## 2022-02-28 PROCEDURE — 77063 MAMMO DIGITAL SCREENING BILAT WITH TOMO: ICD-10-PCS | Mod: 26,,, | Performed by: RADIOLOGY

## 2022-02-28 PROCEDURE — 77063 BREAST TOMOSYNTHESIS BI: CPT | Mod: TC

## 2022-03-02 ENCOUNTER — CLINICAL SUPPORT (OUTPATIENT)
Dept: REHABILITATION | Facility: HOSPITAL | Age: 72
End: 2022-03-02
Payer: MEDICARE

## 2022-03-02 DIAGNOSIS — M25.562 CHRONIC PAIN OF LEFT KNEE: Primary | ICD-10-CM

## 2022-03-02 DIAGNOSIS — G89.29 CHRONIC PAIN OF LEFT KNEE: Primary | ICD-10-CM

## 2022-03-02 PROCEDURE — 97110 THERAPEUTIC EXERCISES: CPT | Mod: HCNC

## 2022-03-02 NOTE — PROGRESS NOTES
[facility]   Physical Therapy Treatment Note     Name: Tracy Najera  Clinic Number: [id number]    Therapy Diagnosis:   Encounter Diagnosis   Name Primary?    Chronic pain of left knee Yes     Physician: Marybeth Odom MD    Visit Date: 3/2/2022  Physician Orders: PT Eval and Treat   Medical Diagnosis from Referral: M25.562,G89.29 (ICD-10-CM) - Chronic pain of left knee M17.12 (ICD-10-CM) - Primary osteoarthritis of left knee      Evaluation Date: 2/15/2022  Authorization Period Expiration: 12/31/2022  Plan of Care Expiration: 03/18/2022  Progress Note Due: 10th visit  Visit # / Visits authorized: 2/20 +Eval  FOTO: 1/3     Precautions: Standard       PTA Visit #: --/5     Time In: 8:25am  Time Out: 9:15am  Total Billable Time: 40 minutes    SUBJECTIVE     Pt reports: that her knee was bothering her last night but it's doing better today.   She was compliant with home exercise program.  Response to previous treatment: knee continues with pain  Functional change:  Nothing noted    Pain: 5/10  Location: left knee      OBJECTIVE     Objective Measures updated at progress report unless specified.     Treatment     Tracy received the treatments listed below:      therapeutic exercises to develop strength, endurance, ROM and flexibility for 40 minutes including:  Nustep 6' for ROM and strengthening  Shuttle DL 5B 3'  SL LLE only 2B 2'  Standing heel raises 20x  Step taps fwd/lat w/RLE only 10x  Sit to stand 24inch 10x  Sit to stand 22 inch 5x  Sit to stand 22 inch w/RLE on block 2 x 5  LAQ x 10, pain in the knee  Quad  20x  SAQ 3 x 10    Deferred:  Straight Leg Raise 3 x 10  Knee squeeze 2 x 10    manual therapy techniques: Joint mobilizations, Manual traction and Soft tissue Mobilization were applied to the: -- for -- minutes, including:  --    neuromuscular re-education activities to improve: Balance, Coordination, Proprioception and Posture for -- minutes. The following activities were  included:  --    hot pack for 10 minutes to L knee.    cold pack for -- minutes to --.        Patient Education and Home Exercises     Home Exercises Provided and Patient Education Provided     Education provided:   - HEP and plan of care    Written Home Exercises Provided: Patient instructed to cont prior HEP. Exercises were reviewed and Tracy was able to demonstrate them prior to the end of the session.  Tracy demonstrated fair  understanding of the education provided. See EMR under Patient Instructions for exercises provided during therapy sessions    ASSESSMENT     Tracy presents with continued L knee pain.  She is lacking terminal knee extension in the L Knee especially with gait. She needed cuing during gait to not shuffle her feet.  She was able to  her feet when cued.  She lacks strength in the LLE and has difficulty with stepping up with the LLE onto a step.  She has poor balance over the LLE vs the RLE.  She needs continued work on strengthening and balance training to the LLE.      Tracy Is progressing well towards her goals.   Pt prognosis is Good.     Pt will continue to benefit from skilled outpatient physical therapy to address the deficits listed in the problem list box on initial evaluation, provide pt/family education and to maximize pt's level of independence in the home and community environment.     Pt's spiritual, cultural and educational needs considered and pt agreeable to plan of care and goals.     Anticipated barriers to physical therapy: none    Short Term Goals:    1. Pt will be 50% independent with HEP.  Progressing     Long Term Goals: 4 weeks   1. Pt will be 100% independent with HEP. Progressing  2. Pt will be able to perform sit to stand without pain in her knee. Progressing  3. Pt will be able to perform light duties around her house without difficulty. Progressing  4. Pt will be able to get in/out of her car without difficulty. Progressing     PLAN   Plan of care  Certification: 2/15/2022 to 03/18/2022.     Outpatient Physical Therapy weekly for 4 weeks to include the following interventions: Electrical Stimulation PRN, Gait Training, Manual Therapy, Moist Heat/ Ice, Neuromuscular Re-ed, Patient Education, Self Care, Therapeutic Activities, Therapeutic Exercise and FDN.       Sadia Webster, PT

## 2022-03-09 ENCOUNTER — CLINICAL SUPPORT (OUTPATIENT)
Dept: REHABILITATION | Facility: HOSPITAL | Age: 72
End: 2022-03-09
Payer: MEDICARE

## 2022-03-09 DIAGNOSIS — G89.29 CHRONIC PAIN OF LEFT KNEE: Primary | ICD-10-CM

## 2022-03-09 DIAGNOSIS — M25.562 CHRONIC PAIN OF LEFT KNEE: Primary | ICD-10-CM

## 2022-03-09 PROCEDURE — 97110 THERAPEUTIC EXERCISES: CPT | Mod: HCNC

## 2022-03-09 NOTE — PROGRESS NOTES
[facility]   Physical Therapy Treatment Note     Name: Tracy Najera  Clinic Number: [id number]    Therapy Diagnosis:   Encounter Diagnosis   Name Primary?    Chronic pain of left knee Yes     Physician: Marybeth Odom MD    Visit Date: 3/9/2022  Physician Orders: PT Eval and Treat   Medical Diagnosis from Referral: M25.562,G89.29 (ICD-10-CM) - Chronic pain of left knee M17.12 (ICD-10-CM) - Primary osteoarthritis of left knee      Evaluation Date: 2/15/2022  Authorization Period Expiration: 12/31/2022  Plan of Care Expiration: 03/18/2022  Progress Note Due: 10th visit  Visit # / Visits authorized: 3/20 +Eval  FOTO: 1/3     Precautions: Standard       PTA Visit #: --/5     Time In: 8:35am  Time Out: 9:30am  Total Billable Time: 45 minutes    SUBJECTIVE     Pt reports: that her knee is doing ok today but was hurting her yesterday.   She was compliant with home exercise program.  Response to previous treatment: knee continues with pain  Functional change:  Nothing noted    Pain: 5/10  Location: left knee      OBJECTIVE     Objective Measures updated at progress report unless specified.     Treatment     Tracy received the treatments listed below:      therapeutic exercises to develop strength, endurance, ROM and flexibility for 45 minutes including:  Nustep 6' for ROM and strengthening  Shuttle DL 5B 3'  SL LLE only 2B 2'  Standing heel raises 20x  Step taps fwd/lat w/RLE only 10x  Sit to stand 24inch 10x  Sit to stand 22 inch 5x  Sit to stand 22 inch w/RLE on block 2 x 5  LAQ 2 x 10, difficulty with full knee extension  Seated knee squeeze 5 sec hold x 20  Quad  20x  SAQ 3 x 10  Prone ham curls 20x-tightness in the hip flexor on the left side noted.  Attempted step ups-but too difficult  Attempted TKE but painful    Deferred:  Straight Leg Raise 3 x 10      manual therapy techniques: Joint mobilizations, Manual traction and Soft tissue Mobilization were applied to the: -- for -- minutes,  including:  --    neuromuscular re-education activities to improve: Balance, Coordination, Proprioception and Posture for -- minutes. The following activities were included:  --    hot pack for 10 minutes to L knee.    cold pack for -- minutes to --.        Patient Education and Home Exercises     Home Exercises Provided and Patient Education Provided     Education provided:   - HEP and plan of care    Written Home Exercises Provided: Patient instructed to cont prior HEP. Exercises were reviewed and Tracy was able to demonstrate them prior to the end of the session.  Tracy demonstrated fair  understanding of the education provided. See EMR under Patient Instructions for exercises provided during therapy sessions    ASSESSMENT     Tracy presents with continued L knee pain with a few of her exercises.  She is having difficulty with quad contraction and needs tactile cuing for proper engagement.  She tends to hold her left knee in slight flexion without terminal knee extension.   She has a type of festinating gait pattern.  She has poor balance over the LLE vs the RLE.  She needs continued work on strengthening and balance training to the LLE.      Tracy Is progressing well towards her goals.   Pt prognosis is Good.     Pt will continue to benefit from skilled outpatient physical therapy to address the deficits listed in the problem list box on initial evaluation, provide pt/family education and to maximize pt's level of independence in the home and community environment.     Pt's spiritual, cultural and educational needs considered and pt agreeable to plan of care and goals.     Anticipated barriers to physical therapy: none    Short Term Goals:    1. Pt will be 50% independent with HEP.  Progressing     Long Term Goals: 4 weeks   1. Pt will be 100% independent with HEP. Progressing  2. Pt will be able to perform sit to stand without pain in her knee. Progressing  3. Pt will be able to perform light duties around  her house without difficulty. Progressing  4. Pt will be able to get in/out of her car without difficulty. Progressing     PLAN   Plan of care Certification: 2/15/2022 to 03/18/2022.     Outpatient Physical Therapy weekly for 4 weeks to include the following interventions: Electrical Stimulation PRN, Gait Training, Manual Therapy, Moist Heat/ Ice, Neuromuscular Re-ed, Patient Education, Self Care, Therapeutic Activities, Therapeutic Exercise and FDN.       Sadia Webster, PT

## 2022-03-16 ENCOUNTER — CLINICAL SUPPORT (OUTPATIENT)
Dept: REHABILITATION | Facility: HOSPITAL | Age: 72
End: 2022-03-16
Payer: MEDICARE

## 2022-03-16 DIAGNOSIS — M25.562 CHRONIC PAIN OF LEFT KNEE: Primary | ICD-10-CM

## 2022-03-16 DIAGNOSIS — G89.29 CHRONIC PAIN OF LEFT KNEE: Primary | ICD-10-CM

## 2022-03-16 PROCEDURE — 97110 THERAPEUTIC EXERCISES: CPT | Mod: HCNC

## 2022-03-16 NOTE — PROGRESS NOTES
[facility]   Physical Therapy Treatment Note     Name: Tracy Najera  Clinic Number: [id number]    Therapy Diagnosis:   Encounter Diagnosis   Name Primary?    Chronic pain of left knee Yes     Physician: Marybeth Odom MD    Visit Date: 3/16/2022  Physician Orders: PT Eval and Treat   Medical Diagnosis from Referral: M25.562,G89.29 (ICD-10-CM) - Chronic pain of left knee M17.12 (ICD-10-CM) - Primary osteoarthritis of left knee      Evaluation Date: 2/15/2022  Authorization Period Expiration: 12/31/2022  Plan of Care Expiration: 03/18/2022  Progress Note Due: 10th visit  Visit # / Visits authorized: 4/20 +Eval  FOTO: 1/3     Precautions: Standard     FOTO next visit!!!!    PTA Visit #: --/5     Time In: 8:45am  Time Out: 9:30am  Total Billable Time: 45 minutes    SUBJECTIVE     Pt reports: that her knee is ok but still causing her pain at times.   She was compliant with home exercise program.  Response to previous treatment: knee continues with pain  Functional change:  Nothing noted    Pain: 5/10  Location: left knee      OBJECTIVE     Objective Measures updated at progress report unless specified.     Treatment     Tracy received the treatments listed below:      therapeutic exercises to develop strength, endurance, ROM and flexibility for 45 minutes including:  Nustep 6' for ROM and strengthening  Shuttle DL 5B 3'  SL LLE only 3B 2'  Standing heel raises 20x  Step taps fwd/lat w/RLE only 10x  Sit to stand 24inch 10x  LAQ 2 x 10  Seated knee squeeze 5 sec hold x 20  Quad  20x  SAQ 3 x 10  Clamshells 2 x 10  Bridging 2 x 10  Supine hip abd 2 x 10  Straight Leg Raise 3 x 10  Bridging 2 x 10        manual therapy techniques: Joint mobilizations, Manual traction and Soft tissue Mobilization were applied to the: -- for -- minutes, including:  --    neuromuscular re-education activities to improve: Balance, Coordination, Proprioception and Posture for -- minutes. The following activities  were included:  --    hot pack for-- minutes to L knee.    cold pack for -- minutes to --.        Patient Education and Home Exercises     Home Exercises Provided and Patient Education Provided     Education provided:   - HEP and plan of care    Written Home Exercises Provided: Patient instructed to cont prior HEP. Exercises were reviewed and Tracy was able to demonstrate them prior to the end of the session.  Tracy demonstrated fair  understanding of the education provided. See EMR under Patient Instructions for exercises provided during therapy sessions    ASSESSMENT     Tracy presents with continued L knee pain with a few of her exercises.  She has increased difficulty with trying to put her left foot up onto a step due to balance.  But she also has continued weakness in the left leg as she cannot step up onto a 4inch step leading with the LLE.   She is unable to perform hip abduction against gravity in sidelying due to increased weakness. She needs continued work on strengthening and balance training to the LLE.      Tracy Is progressing well towards her goals.   Pt prognosis is Good.     Pt will continue to benefit from skilled outpatient physical therapy to address the deficits listed in the problem list box on initial evaluation, provide pt/family education and to maximize pt's level of independence in the home and community environment.     Pt's spiritual, cultural and educational needs considered and pt agreeable to plan of care and goals.     Anticipated barriers to physical therapy: none    Short Term Goals:    1. Pt will be 50% independent with HEP.  Progressing     Long Term Goals: 4 weeks   1. Pt will be 100% independent with HEP. Progressing  2. Pt will be able to perform sit to stand without pain in her knee. Progressing  3. Pt will be able to perform light duties around her house without difficulty. Progressing  4. Pt will be able to get in/out of her car without difficulty. Progressing     PLAN    Plan of care Certification: 2/15/2022 to 03/18/2022.     Outpatient Physical Therapy weekly for 4 weeks to include the following interventions: Electrical Stimulation PRN, Gait Training, Manual Therapy, Moist Heat/ Ice, Neuromuscular Re-ed, Patient Education, Self Care, Therapeutic Activities, Therapeutic Exercise and FDN.       Sadia Webster, PT

## 2022-03-22 ENCOUNTER — OFFICE VISIT (OUTPATIENT)
Dept: SPORTS MEDICINE | Facility: CLINIC | Age: 72
End: 2022-03-22
Payer: MEDICARE

## 2022-03-22 VITALS — WEIGHT: 227 LBS | BODY MASS INDEX: 41.77 KG/M2 | HEIGHT: 62 IN

## 2022-03-22 DIAGNOSIS — M17.12 PRIMARY OSTEOARTHRITIS OF LEFT KNEE: ICD-10-CM

## 2022-03-22 DIAGNOSIS — M25.562 CHRONIC PAIN OF LEFT KNEE: Primary | ICD-10-CM

## 2022-03-22 DIAGNOSIS — G89.29 CHRONIC PAIN OF LEFT KNEE: Primary | ICD-10-CM

## 2022-03-22 PROCEDURE — 99213 OFFICE O/P EST LOW 20 MIN: CPT | Mod: S$GLB,,, | Performed by: PHYSICAL MEDICINE & REHABILITATION

## 2022-03-22 PROCEDURE — 4010F PR ACE/ARB THEARPY RXD/TAKEN: ICD-10-PCS | Mod: CPTII,S$GLB,, | Performed by: PHYSICAL MEDICINE & REHABILITATION

## 2022-03-22 PROCEDURE — 3044F PR MOST RECENT HEMOGLOBIN A1C LEVEL <7.0%: ICD-10-PCS | Mod: CPTII,S$GLB,, | Performed by: PHYSICAL MEDICINE & REHABILITATION

## 2022-03-22 PROCEDURE — 1101F PT FALLS ASSESS-DOCD LE1/YR: CPT | Mod: CPTII,S$GLB,, | Performed by: PHYSICAL MEDICINE & REHABILITATION

## 2022-03-22 PROCEDURE — 1125F PR PAIN SEVERITY QUANTIFIED, PAIN PRESENT: ICD-10-PCS | Mod: CPTII,S$GLB,, | Performed by: PHYSICAL MEDICINE & REHABILITATION

## 2022-03-22 PROCEDURE — 3008F BODY MASS INDEX DOCD: CPT | Mod: CPTII,S$GLB,, | Performed by: PHYSICAL MEDICINE & REHABILITATION

## 2022-03-22 PROCEDURE — 1125F AMNT PAIN NOTED PAIN PRSNT: CPT | Mod: CPTII,S$GLB,, | Performed by: PHYSICAL MEDICINE & REHABILITATION

## 2022-03-22 PROCEDURE — 1101F PR PT FALLS ASSESS DOC 0-1 FALLS W/OUT INJ PAST YR: ICD-10-PCS | Mod: CPTII,S$GLB,, | Performed by: PHYSICAL MEDICINE & REHABILITATION

## 2022-03-22 PROCEDURE — 99213 PR OFFICE/OUTPT VISIT, EST, LEVL III, 20-29 MIN: ICD-10-PCS | Mod: S$GLB,,, | Performed by: PHYSICAL MEDICINE & REHABILITATION

## 2022-03-22 PROCEDURE — 99999 PR PBB SHADOW E&M-EST. PATIENT-LVL III: ICD-10-PCS | Mod: PBBFAC,,, | Performed by: PHYSICAL MEDICINE & REHABILITATION

## 2022-03-22 PROCEDURE — 1160F PR REVIEW ALL MEDS BY PRESCRIBER/CLIN PHARMACIST DOCUMENTED: ICD-10-PCS | Mod: CPTII,S$GLB,, | Performed by: PHYSICAL MEDICINE & REHABILITATION

## 2022-03-22 PROCEDURE — 1159F MED LIST DOCD IN RCRD: CPT | Mod: CPTII,S$GLB,, | Performed by: PHYSICAL MEDICINE & REHABILITATION

## 2022-03-22 PROCEDURE — 4010F ACE/ARB THERAPY RXD/TAKEN: CPT | Mod: CPTII,S$GLB,, | Performed by: PHYSICAL MEDICINE & REHABILITATION

## 2022-03-22 PROCEDURE — 3044F HG A1C LEVEL LT 7.0%: CPT | Mod: CPTII,S$GLB,, | Performed by: PHYSICAL MEDICINE & REHABILITATION

## 2022-03-22 PROCEDURE — 1160F RVW MEDS BY RX/DR IN RCRD: CPT | Mod: CPTII,S$GLB,, | Performed by: PHYSICAL MEDICINE & REHABILITATION

## 2022-03-22 PROCEDURE — 1159F PR MEDICATION LIST DOCUMENTED IN MEDICAL RECORD: ICD-10-PCS | Mod: CPTII,S$GLB,, | Performed by: PHYSICAL MEDICINE & REHABILITATION

## 2022-03-22 PROCEDURE — 3288F FALL RISK ASSESSMENT DOCD: CPT | Mod: CPTII,S$GLB,, | Performed by: PHYSICAL MEDICINE & REHABILITATION

## 2022-03-22 PROCEDURE — 3288F PR FALLS RISK ASSESSMENT DOCUMENTED: ICD-10-PCS | Mod: CPTII,S$GLB,, | Performed by: PHYSICAL MEDICINE & REHABILITATION

## 2022-03-22 PROCEDURE — 99999 PR PBB SHADOW E&M-EST. PATIENT-LVL III: CPT | Mod: PBBFAC,,, | Performed by: PHYSICAL MEDICINE & REHABILITATION

## 2022-03-22 PROCEDURE — 3008F PR BODY MASS INDEX (BMI) DOCUMENTED: ICD-10-PCS | Mod: CPTII,S$GLB,, | Performed by: PHYSICAL MEDICINE & REHABILITATION

## 2022-03-22 NOTE — PROGRESS NOTES
SPORTS MEDICINE / PM&R Follow Up Visit :    Referring Physician: No ref. provider found    Chief Complaint   Patient presents with    Left Knee - Pain       HPI: This is a 71 y.o.  female being seen in clinic today for evaluation of Pain of the Left Knee       She was last seen in clinic 02/10/2022. Since last visit, the symptoms are improving.  She has been to therapy. She has tried physical therapy, heating pad and Voltaren gel for this problem with good improvement.       History obtained from patient.  Patient states that she is feeling better with her knee pain.  She has been attending physical therapy at [facility] with Sadia and has attended 5 visits.  She currently rates her pain at a 5/10. She states that prolonged sitting causes her to have pain but states that she feels like she is getting around better now.  She did not feel that Voltaren was very helpful.  She did not tried the other over-the-counter supplements we discussed.    Past family, medical, social, surgical history, and vital signs reviewed in chart.      General    Nursing note and vitals reviewed.  Constitutional: She is oriented to person, place, and time. She appears well-developed and well-nourished.   HENT:   Head: Normocephalic and atraumatic.   Eyes: Conjunctivae and EOM are normal. Pupils are equal, round, and reactive to light.   Neck: Neck supple.   Cardiovascular: Intact distal pulses.    Pulmonary/Chest: Effort normal. No respiratory distress.   Abdominal: She exhibits no distension.   Neurological: She is alert and oriented to person, place, and time.   Psychiatric: She has a normal mood and affect.     General Musculoskeletal Exam   Gait: antalgic       Right Knee Exam   Right knee exam is normal.    Inspection   Erythema: absent  Swelling: absent  Effusion: absent    Range of Motion   The patient has normal right knee ROM.    Tests   Meniscus   Roger:  Medial - negative Lateral - negative  Ligament Examination Lachman: normal  (-1 to 2mm) PCL-Posterior Drawer: normal (0 to 2mm)     MCL - Valgus: normal (0 to 2mm)  LCL - Varus: normal  Patella   Patellar apprehension: negative  Patellar Tracking: normal    Other   Sensation: normal    Left Knee Exam     Inspection   Erythema: absent  Swelling: absent  Effusion: absent  Deformity: present (valgus)    Tenderness   The patient tender to palpation of the medial joint line and lateral joint line.    Crepitus   The patient has crepitus of the lateral joint line.    Range of Motion   The patient has normal left knee ROM.    Tests   Meniscus   Roger:  Medial - negative Lateral - negative  Stability Lachman: normal (-1 to 2mm) PCL-Posterior Drawer: normal (0 to 2mm)  MCL - Valgus: normal (0 to 2mm)  LCL - Varus: normal (0 to 2mm)  Patella   Patellar apprehension: negative  Patellar Tracking: normal    Other   Meniscal Cyst: absent  Popliteal (Baker's) Cyst: absent  Sensation: normal    Right Hip Exam   Right hip exam is normal.     Tests   Pain w/ forced internal rotation (PEE): absent  Left Hip Exam   Left hip exam is normal.    Tests   Pain w/ forced internal rotation (PEE): absent          Muscle Strength   Right Lower Extremity   Hip Abduction: 4/5   Hip Adduction: 5/5   Hip Flexion: 5/5   Quadriceps:  5/5   Hamstrin/5   Left Lower Extremity   Hip Abduction: 4/5   Hip Adduction: 5/5   Hip Flexion: 5/5   Quadriceps:  5/5 and 4/5   Hamstrin/5     Reflexes     Left Side  Achilles:  0  Quadriceps:  0    Right Side   Achilles:  0  Quadriceps:  0    Vascular Exam     Right Pulses  Dorsalis Pedis:      2+          Left Pulses  Dorsalis Pedis:      2+              Mammo Digital Screening Bilat w/ Yusef  Narrative: Result:  Mammo Digital Screening Bilat w/ Yusef    History:  Patient is 71 y.o. and is seen for a screening mammogram.    Films Compared:  Compared to: 2021 Mammo Digital Screening Bilat w/ Yusef, 2020   Mammo Digital Screening Bilat w/ Yusef, 2019 Mammo Digital  Diagnostic   Right w/ Yusef, 02/25/2019 Mammo Digital Screening Bilat w/ Yusef, and   02/02/2018 Mammo Digital Screening Bilat with Tomosynthesis_CAD     Findings:   This procedure was performed using tomosynthesis.   Computer-aided detection was utilized in the interpretation of this   examination.    The breasts have scattered areas of fibroglandular density. There is no   evidence of suspicious masses, microcalcifications or architectural   distortion.  Impression:    No mammographic evidence of malignancy.    BI-RADS Category 1: Negative    Recommendation:  Routine screening mammogram in 1 year is recommended.    Your estimated lifetime risk of breast cancer (to age 85) based on   Tyrer-Cuzick risk assessment model is 2.64 %.  According to the American   Cancer Society, patients with a lifetime breast cancer risk of 20% or   higher might benefit from supplemental screening tests. ??             Patient Instructions     Assessment:  Tracy Naejra is a 71 y.o. female   Chief Complaint   Patient presents with    Left Knee - Pain       Chronic pain of left knee    Primary osteoarthritis of left knee        Plan:   We personally reviewed her imaging today in clinic and agree with the radiologist's report.   She is overall pleased with her progress with physical therapy.   She would like to continue therapy as she thinks she will continue to get benefit from it.  I strongly encouraged her to do so.   We again discussed possibility of trying a corticosteroid injection as I believe this would be the  next line treatment of her arthritis and knee pain.  She would like to hold off at this time.      Follow-up:  P.r.n.     Thank you for choosing [facility] and Dr. Marybeth Odom for your orthopedic & sports medicine care. It is our goal to provide you with exceptional care that will help keep you healthy, active, and get you back in the game.    Please do not hesitate to reach out to us via email,  phone, or MyChart with any questions, concerns, or feedback.    If you felt that you received exemplary care today, please consider leaving us feedback on Healthgrades at:  [email or web address]     If you are experiencing pain/discomfort ,or have questions after 5pm and would like to be connected to the [facility] on-call team, please call this number and specify which Sports Medicine provider is treating you: [phone]          Disclaimer: This note was prepared using a voice recognition system and is likely to have sound alike errors within the text.     Marybeth Odom M.D.  Sports Medicine

## 2022-03-22 NOTE — PATIENT INSTRUCTIONS
Assessment:  Tracy Najera is a 71 y.o. female   Chief Complaint   Patient presents with    Left Knee - Pain       Chronic pain of left knee    Primary osteoarthritis of left knee        Plan:  We personally reviewed her imaging today in clinic and agree with the radiologist's report.  She is overall pleased with her progress with physical therapy.  She would like to continue therapy as she thinks she will continue to get benefit from it.  I strongly encouraged her to do so.  We again discussed possibility of trying a corticosteroid injection as I believe this would be the  next line treatment of her arthritis and knee pain.  She would like to hold off at this time.      Follow-up:  P.r.n.     Thank you for choosing [facility] and Dr. Marybeth Odom for your orthopedic & sports medicine care. It is our goal to provide you with exceptional care that will help keep you healthy, active, and get you back in the game.    Please do not hesitate to reach out to us via email, phone, or MyChart with any questions, concerns, or feedback.    If you felt that you received exemplary care today, please consider leaving us feedback on Healthgrades at:  [email or web address]     If you are experiencing pain/discomfort ,or have questions after 5pm and would like to be connected to the [facility] on-call team, please call this number and specify which Sports Medicine provider is treating you: [phone]

## 2022-03-23 ENCOUNTER — CLINICAL SUPPORT (OUTPATIENT)
Dept: REHABILITATION | Facility: HOSPITAL | Age: 72
End: 2022-03-23
Payer: MEDICARE

## 2022-03-23 DIAGNOSIS — M25.562 CHRONIC PAIN OF LEFT KNEE: Primary | ICD-10-CM

## 2022-03-23 DIAGNOSIS — G89.29 CHRONIC PAIN OF LEFT KNEE: Primary | ICD-10-CM

## 2022-03-23 PROCEDURE — 97110 THERAPEUTIC EXERCISES: CPT

## 2022-03-23 NOTE — PROGRESS NOTES
[facility]   Physical Therapy Treatment Note     Name: Tracy Najera  Clinic Number: [id number]    Therapy Diagnosis:   Encounter Diagnosis   Name Primary?    Chronic pain of left knee Yes     Physician: Marybeth Odom MD    Visit Date: 3/23/2022  Physician Orders: PT Eval and Treat   Medical Diagnosis from Referral: M25.562,G89.29 (ICD-10-CM) - Chronic pain of left knee M17.12 (ICD-10-CM) - Primary osteoarthritis of left knee      Evaluation Date: 2/15/2022  Authorization Period Expiration: 12/31/2022  Plan of Care Expiration: 05/302022  Progress Note Due: 10th visit  Visit # / Visits authorized: 5/20 +Eval  FOTO: 2/3     Precautions: Standard       PTA Visit #: --/5     Time In: 9:30am  Time Out: 10:25am  Total Billable Time: 45 minutes    SUBJECTIVE     Pt reports: that her left knee is bothering her today.   She was compliant with home exercise program.  Response to previous treatment: knee continues with pain  Functional change:  Nothing noted    Pain: 5/10  Location: left knee      OBJECTIVE     Objective Measures updated at progress report unless specified.     Treatment     Tracy received the treatments listed below:      therapeutic exercises to develop strength, endurance, ROM and flexibility for 45 minutes including:  Nustep 6' for ROM and strengthening    LAQ 2# 2 x 10  Seated ham curl w/GTB 20x  Seated knee squeeze 5 sec hold x 20  Quad  20x  SAQ 3 x 10  Clamshells 2 x 10  Bridging 2 x 10  Supine hip abd 2 x 10  Straight Leg Raise 3 x 10, lacks quad activation  Bridging 2 x 10        manual therapy techniques: Joint mobilizations, Manual traction and Soft tissue Mobilization were applied to the: -- for -- minutes, including:  --    neuromuscular re-education activities to improve: Balance, Coordination, Proprioception and Posture for -- minutes. The following activities were included:  --    hot pack for 10 minutes to L knee.    cold pack for -- minutes to  --.        Patient Education and Home Exercises     Home Exercises Provided and Patient Education Provided     Education provided:   - HEP and plan of care    Written Home Exercises Provided: Patient instructed to cont prior HEP. Exercises were reviewed and Tracy was able to demonstrate them prior to the end of the session.  Tracy demonstrated fair  understanding of the education provided. See EMR under Patient Instructions for exercises provided during therapy sessions    ASSESSMENT     Tracy presents with continued increased L knee pain and was unable to tolerate standing exercises this visit.  She demonstrates an extensor lag with Straight Leg Raise due to minimal quad activation.  She has continued weakness in her Left quad and glut med/max.  She needs continued work on strengthening and balance training to the LLE.      Tracy Is progressing well towards her goals.   Pt prognosis is Good.     Pt will continue to benefit from skilled outpatient physical therapy to address the deficits listed in the problem list box on initial evaluation, provide pt/family education and to maximize pt's level of independence in the home and community environment.     Pt's spiritual, cultural and educational needs considered and pt agreeable to plan of care and goals.     Anticipated barriers to physical therapy: none    Short Term Goals:    1. Pt will be 50% independent with HEP.  Met     Long Term Goals: 4 weeks   1. Pt will be 100% independent with HEP. Progressing  2. Pt will be able to perform sit to stand without pain in her knee. Progressing  3. Pt will be able to perform light duties around her house without difficulty. Progressing  4. Pt will be able to get in/out of her car without difficulty. Progressing     PLAN   Plan of care Certification: 3/18/2022 to 05/30/2022.     Outpatient Physical Therapy weekly for 6 weeks to include the following interventions: Electrical Stimulation PRN, Gait Training, Manual Therapy,  Moist Heat/ Ice, Neuromuscular Re-ed, Patient Education, Self Care, Therapeutic Activities, Therapeutic Exercise and FDN.       Sadia Webster, PT

## 2022-04-01 ENCOUNTER — DOCUMENTATION ONLY (OUTPATIENT)
Dept: REHABILITATION | Facility: HOSPITAL | Age: 72
End: 2022-04-01
Payer: MEDICARE

## 2022-04-01 ENCOUNTER — CLINICAL SUPPORT (OUTPATIENT)
Dept: REHABILITATION | Facility: HOSPITAL | Age: 72
End: 2022-04-01
Payer: MEDICARE

## 2022-04-01 DIAGNOSIS — G89.29 CHRONIC PAIN OF LEFT KNEE: Primary | ICD-10-CM

## 2022-04-01 DIAGNOSIS — M25.562 CHRONIC PAIN OF LEFT KNEE: Primary | ICD-10-CM

## 2022-04-01 PROCEDURE — 97110 THERAPEUTIC EXERCISES: CPT | Mod: CQ

## 2022-04-01 NOTE — PLAN OF CARE
[facility]   Updated Plan of Care Physical Therapy Note     Name: Tracy Najera  Clinic Number: [id number]    Therapy Diagnosis:   Encounter Diagnosis   Name Primary?    Chronic pain of left knee Yes     Physician: Marybeth Odom MD    Visit Date: 3/23/2022  Physician Orders: PT Eval and Treat   Medical Diagnosis from Referral: M25.562,G89.29 (ICD-10-CM) - Chronic pain of left knee M17.12 (ICD-10-CM) - Primary osteoarthritis of left knee      Evaluation Date: 2/15/2022  Authorization Period Expiration: 12/31/2022  Plan of Care Expiration: 03/18/2022  Progress Note Due: 10th visit  Visit # / Visits authorized: 5/20 +Eval  FOTO: 2/3     Precautions: Standard     SUBJECTIVE     Pt reports: that her left knee is bothering her today.   She was compliant with home exercise program.  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  She tends to have increased lateral trunk sway. No AD used.     Sensation: Intact to light touch B LE's, all dermatomes        Strength:                                    L/E MMT Right Left Pain/Dysfunction with Movement   Hip Flexion 4+/5 4+/5     Hip Extension 4-/5 4-/5     Gluteus medius 4-/5 4-/5     Gluteus urbano 4-/5 4-/5     Hip IR 4+/5 4+/5     Hip ER 4+/5 4+/5     Knee Flexion 4+/5 4+/5     Knee Extension 4+/5 4+/5     Ankle DF 4+/5 4+/5     Ankle PF 4+/5 4+/5     Ankle Inversion 4+/5 4+/5     Ankle Eversion 4+/5 4+/5                                  Range of Motion:                Knee Right Left Pain/Dysfunction with Movement   AROM         flexion  110  112     extension  0  9 Lacks terminal knee ext by 9 degrees in L knee          Hip Right Left Pain/Dysfunction with Movement   AROM         flexion  90  90     extension  5  5     abduction  15  15         CMS Impairment/Limitation/Restriction for FOTO Knee Survey    Therapist reviewed FOTO scores for Tracy Najera on 03/18/2022.   FOTO documents entered into [employer] - see Media section.    Limitation Score: 51%         ASSESSMENT     Tracy presents with continued increased L knee pain and was unable to tolerate standing exercises this visit.  She demonstrates an extensor lag with Straight Leg Raise due to minimal quad activation.  She has continued weakness in her Left quad and glut med/max.  She needs continued work on strengthening and balance training to the LLE.      Tracy Is progressing well towards her goals.   Pt prognosis is Good.     Pt will continue to benefit from skilled outpatient physical therapy to address the deficits listed in the problem list box on initial evaluation, provide pt/family education and to maximize pt's level of independence in the home and community environment.     Pt's spiritual, cultural and educational needs considered and pt agreeable to plan of care and goals.     Anticipated barriers to physical therapy: none    Short Term Goals:    1. Pt will be 50% independent with HEP.  Met     Long Term Goals: 4 weeks   1. Pt will be 100% independent with HEP. Progressing  2. Pt will be able to perform sit to stand without pain in her knee. Progressing  3. Pt will be able to perform light duties around her house without difficulty. Progressing  4. Pt will be able to get in/out of her car without difficulty. Progressing     PLAN   Plan of care Certification: 3/18/2022 to 05/30/2022.     Outpatient Physical Therapy weekly for 6 weeks to include the following interventions: Electrical Stimulation PRN, Gait Training, Manual Therapy, Moist Heat/ Ice, Neuromuscular Re-ed, Patient Education, Self Care, Therapeutic Activities, Therapeutic Exercise and FDN.       Sadia Webster, PT

## 2022-04-01 NOTE — PROGRESS NOTES
[facility]   Physical Therapy Assistant Treatment Note     Name: Tracy Najera  Clinic Number: [id number]    Therapy Diagnosis:   Encounter Diagnosis   Name Primary?    Chronic pain of left knee Yes     Physician: Marybeth Odom MD    Visit Date: 4/1/2022  Physician Orders: PT Eval and Treat   Medical Diagnosis from Referral: M25.562,G89.29 (ICD-10-CM) - Chronic pain of left knee M17.12 (ICD-10-CM) - Primary osteoarthritis of left knee      Evaluation Date: 2/15/2022  Authorization Period Expiration: 12/31/2022  Plan of Care Expiration: 05/30/2022  Progress Note Due: 10th visit  Visit # / Visits authorized: 6/20 +Eval  FOTO: 2/3     Precautions: Standard       PTA Visit #: 1/5     Time In: 10:55am  Time Out: 11:50am  Total Billable Time: 45 minutes    SUBJECTIVE     Pt reports: she had increased left knee pain yesterday.  She was compliant with home exercise program.  Response to previous treatment: knee continues with pain   Functional change:  Nothing noted    Pain: 8/10   Location: left knee      OBJECTIVE     Objective Measures updated at progress report unless specified.     Treatment     Tracy received the treatments listed below:      therapeutic exercises to develop strength, endurance, ROM and flexibility for 40 minutes including:  Nustep 6' for ROM and strengthening  Shuttle DL 5B 3'  SL LLE only 3B 2'Standing heel raises 30x  Sit to stand 22 inch 5x  Sit to stand 22 inch w/RLE on block 2 x 5LAQ 2# 2 x 10  Seated ham curl w/GTB 20x  Seated knee squeeze 5 sec hold x 20  Quad sets 20x  SAQ 3 x 10  Clamshells 2 x 10  Bridging 2 x 10  Side lying hip abd 2 x 10  Straight Leg Raise 3 x 10, lacks quad activation  Bridging 2 x 10        manual therapy techniques: Joint mobilizations, Manual traction and Soft tissue Mobilization were applied to the: left knee for 5 minutes, including:  L Knee joint distraction    neuromuscular re-education activities to improve: Balance, Coordination,  Proprioception and Posture for -- minutes. The following activities were included:  --    hot pack for 10 minutes to L knee.    cold pack for -- minutes to --.        Patient Education and Home Exercises     Home Exercises Provided and Patient Education Provided     Education provided:   - HEP and plan of care     Written Home Exercises Provided: Patient instructed to cont prior HEP. Exercises were reviewed and Tracy was able to demonstrate them prior to the end of the session.  Tracy demonstrated fair  understanding of the education provided. See EMR under Patient Instructions for exercises provided during therapy sessions    ASSESSMENT     Tracy presents with continued L knee pain but able to tolerate some standing exercises today. She has increased pain with stepping down from mat to step stool. Focused on gait throughout clinic because patient tends to have decreased hip flexion resulting in dragging right leg. Patient cued to increase hip flexion with improved steadier gait pattern. Patient needs tactile cuing with sit to stand to improve weight bearing through left leg which she was avoiding due to increased pain.    Tracy Is progressing well towards her goals.   Pt prognosis is Good.     Pt will continue to benefit from skilled outpatient physical therapy to address the deficits listed in the problem list box on initial evaluation, provide pt/family education and to maximize pt's level of independence in the home and community environment.     Pt's spiritual, cultural and educational needs considered and pt agreeable to plan of care and goals.     Anticipated barriers to physical therapy: none    Short Term Goals:    1. Pt will be 50% independent with HEP.  Progressing     Long Term Goals: 4 weeks   1. Pt will be 100% independent with HEP. Progressing  2. Pt will be able to perform sit to stand without pain in her knee. Progressing  3. Pt will be able to perform light duties around her house without  difficulty. Progressing  4. Pt will be able to get in/out of her car without difficulty. Progressing     PLAN   Plan of care Certification: 03/23/2021 to 05/30/2022.     Outpatient Physical Therapy weekly for 4 weeks to include the following interventions: Electrical Stimulation PRN, Gait Training, Manual Therapy, Moist Heat/ Ice, Neuromuscular Re-ed, Patient Education, Self Care, Therapeutic Activities, Therapeutic Exercise and FDN.       Neena Wiley, PTA

## 2022-04-01 NOTE — PROGRESS NOTES
PT/PTA met face to face to discuss pt's treatment plan and progress towards established goals. Pt will be seen by a physical therapist minimally every 6th visit or every 30 days.    Neena Wiley PTA

## 2022-04-05 ENCOUNTER — CLINICAL SUPPORT (OUTPATIENT)
Dept: REHABILITATION | Facility: HOSPITAL | Age: 72
End: 2022-04-05
Payer: MEDICARE

## 2022-04-05 DIAGNOSIS — M25.562 CHRONIC PAIN OF LEFT KNEE: Primary | ICD-10-CM

## 2022-04-05 DIAGNOSIS — G89.29 CHRONIC PAIN OF LEFT KNEE: Primary | ICD-10-CM

## 2022-04-05 PROCEDURE — 97110 THERAPEUTIC EXERCISES: CPT

## 2022-04-05 NOTE — PROGRESS NOTES
[facility] OUTPATIENT THERAPY AND WELLNESS   Physical Therapy Treatment Note     Name: Tracy Najera  Clinic Number: [id number]    Therapy Diagnosis:   Encounter Diagnosis   Name Primary?    Chronic pain of left knee Yes     Physician: Marybeth Odom MD    Visit Date: 4/5/2022  Physician Orders: PT Eval and Treat   Medical Diagnosis from Referral: M25.562,G89.29 (ICD-10-CM) - Chronic pain of left knee M17.12 (ICD-10-CM) - Primary osteoarthritis of left knee      Evaluation Date: 2/15/2022  Authorization Period Expiration: 12/31/2022  Plan of Care Expiration: 05/30/2022  Progress Note Due: 10th visit  Visit # / Visits authorized: 7/20 +Eval  FOTO: 2/3     Precautions: Standard       PTA Visit #: 1/5     Time In: 9:30am  Time Out: 10:35am  Total Billable Time: 55 minutes    SUBJECTIVE     Pt reports: she does feel like her knee is getting better with therapy.   She was compliant with home exercise program.  Response to previous treatment: knee continues with pain   Functional change:  Nothing noted    Pain: 8/10   Location: left knee      OBJECTIVE     Objective Measures updated at progress report unless specified.     Treatment     Tracy received the treatments listed below:      therapeutic exercises to develop strength, endurance, ROM and flexibility for 55 minutes including:  Nustep 8' for ROM and strengthening  Shuttle DL 5B 3'  SL LLE only 3B 2'Standing heel raises 30xStep taps fwd/lat RLE only 10xSit to stand 22 inch w/RLE on block 10x, 5x  Step ups onto 4inch w/LLE  Seated Marches on LLE 2# 20xLAQ 2# 2 x 10  Seated ham curl w/GTB 20x  Seated knee squeeze 5 sec hold x 20  Quad sets 20x  Straight Leg Raise 2 x 15-emphasis on quad contraction    Clamshells 2 x 10  Bridging 2 x 10  Side lying hip abd 2 x 10  Bridging 2 x 10        manual therapy techniques: Joint mobilizations, Manual traction and Soft tissue Mobilization were applied to the: left knee for 5 minutes, including:  L Knee joint  "distraction    neuromuscular re-education activities to improve: Balance, Coordination, Proprioception and Posture for -- minutes. The following activities were included:  --    hot pack for 10 minutes to L knee.    cold pack for -- minutes to --.        Patient Education and Home Exercises     Home Exercises Provided and Patient Education Provided     Education provided:   - HEP and plan of care     Written Home Exercises Provided: Patient instructed to cont prior HEP. Exercises were reviewed and Tracy was able to demonstrate them prior to the end of the session.  Tracy demonstrated fair  understanding of the education provided. See EMR under Patient Instructions for exercises provided during therapy sessions    ASSESSMENT     Tracy has improved pain in the L Knee.  She is still lacking strength in the leg where she has difficulty stepping up onto a 4" step with the LLE only.  She lacks terminal knee extension in the LLE.  Continue with hip and knee strengthening to further improve her left knee pain.      Tracy Is progressing well towards her goals.   Pt prognosis is Good.     Pt will continue to benefit from skilled outpatient physical therapy to address the deficits listed in the problem list box on initial evaluation, provide pt/family education and to maximize pt's level of independence in the home and community environment.     Pt's spiritual, cultural and educational needs considered and pt agreeable to plan of care and goals.     Anticipated barriers to physical therapy: none    Short Term Goals:    1. Pt will be 50% independent with HEP.  Progressing     Long Term Goals: 4 weeks   1. Pt will be 100% independent with HEP. Progressing  2. Pt will be able to perform sit to stand without pain in her knee. Progressing  3. Pt will be able to perform light duties around her house without difficulty. Progressing  4. Pt will be able to get in/out of her car without difficulty. Progressing     PLAN   Plan of " care Certification: 03/23/2021 to 05/30/2022.     Outpatient Physical Therapy weekly for 4 weeks to include the following interventions: Electrical Stimulation PRN, Gait Training, Manual Therapy, Moist Heat/ Ice, Neuromuscular Re-ed, Patient Education, Self Care, Therapeutic Activities, Therapeutic Exercise and FDN.       Sadia Webster, PT

## 2022-04-12 ENCOUNTER — CLINICAL SUPPORT (OUTPATIENT)
Dept: REHABILITATION | Facility: HOSPITAL | Age: 72
End: 2022-04-12
Payer: MEDICARE

## 2022-04-12 DIAGNOSIS — G89.29 CHRONIC PAIN OF LEFT KNEE: Primary | ICD-10-CM

## 2022-04-12 DIAGNOSIS — M25.562 CHRONIC PAIN OF LEFT KNEE: Primary | ICD-10-CM

## 2022-04-12 PROCEDURE — 97110 THERAPEUTIC EXERCISES: CPT

## 2022-04-12 NOTE — PROGRESS NOTES
[facility]   Physical Therapy Treatment Note     Name: Tracy Najera  Clinic Number: [id number]    Therapy Diagnosis:   Encounter Diagnosis   Name Primary?    Chronic pain of left knee Yes     Physician: Marybeth Odom MD    Visit Date: 4/12/2022  Physician Orders: PT Eval and Treat   Medical Diagnosis from Referral: M25.562,G89.29 (ICD-10-CM) - Chronic pain of left knee M17.12 (ICD-10-CM) - Primary osteoarthritis of left knee      Evaluation Date: 2/15/2022  Authorization Period Expiration: 12/31/2022  Plan of Care Expiration: 05/30/2022  Progress Note Due: 10th visit  Visit # / Visits authorized: 8/20 +Eval  FOTO: 2/3     Precautions: Standard       PTA Visit #: 1/5     Time In: 9:30am  Time Out: 10:20am  Total Billable Time: 50 minutes    SUBJECTIVE     Pt reports: her left knee is feeling ok today.   She was compliant with home exercise program.  Response to previous treatment: knee continues with pain   Functional change:  Nothing noted    Pain: 8/10   Location: left knee      OBJECTIVE     Objective Measures updated at progress report unless specified.     Treatment     Tracy received the treatments listed below:      therapeutic exercises to develop strength, endurance, ROM and flexibility for 50 minutes including:  Nustep 8' for ROM and strengthening  Shuttle DL 5B 3'  SL LLE only 3B 2'Standing heel raises 30xStep taps fwd/lat RLE only 10xSit to stand 22 inch w/RLE on block 10x, 5x  Step ups onto 4inch w/LLE  Seated Marches on LLE 2# 20xLAQ 2# 2 x 10  Seated ham curl w/GTB 20x    Quad sets 20x  Straight Leg Raise 2 x 15-emphasis on quad contraction    Clamshells 2 x 10  Side lying hip abd 2 x 10          manual therapy techniques: Joint mobilizations, Manual traction and Soft tissue Mobilization were applied to the: left knee for -- minutes, including:  L Knee joint distraction    neuromuscular re-education activities to improve: Balance, Coordination, Proprioception and  Posture for -- minutes. The following activities were included:  --    hot pack for -- minutes to L knee.    cold pack for -- minutes to --.        Patient Education and Home Exercises     Home Exercises Provided and Patient Education Provided     Education provided:   - HEP and plan of care     Written Home Exercises Provided: Patient instructed to cont prior HEP. Exercises were reviewed and Tracy was able to demonstrate them prior to the end of the session.  Tracy demonstrated fair  understanding of the education provided. See EMR under Patient Instructions for exercises provided during therapy sessions    ASSESSMENT     Tracy has improved pain in the L Knee.  She is still lacks strength in the left quadriceps.  She has difficulty with single leg standing on the LLE.  She also needs cuing during ambulation to  her feet as she tends to shuffle her feet.  Continue with hip and knee strengthening to further improve her left knee pain.      Tracy Is progressing well towards her goals.   Pt prognosis is Good.     Pt will continue to benefit from skilled outpatient physical therapy to address the deficits listed in the problem list box on initial evaluation, provide pt/family education and to maximize pt's level of independence in the home and community environment.     Pt's spiritual, cultural and educational needs considered and pt agreeable to plan of care and goals.     Anticipated barriers to physical therapy: none    Short Term Goals:    1. Pt will be 50% independent with HEP.  Progressing     Long Term Goals: 4 weeks   1. Pt will be 100% independent with HEP. Progressing  2. Pt will be able to perform sit to stand without pain in her knee. Progressing  3. Pt will be able to perform light duties around her house without difficulty. Progressing  4. Pt will be able to get in/out of her car without difficulty. Progressing     PLAN   Plan of care Certification: 03/23/2021 to 05/30/2022.     Outpatient  Physical Therapy weekly for 4 weeks to include the following interventions: Electrical Stimulation PRN, Gait Training, Manual Therapy, Moist Heat/ Ice, Neuromuscular Re-ed, Patient Education, Self Care, Therapeutic Activities, Therapeutic Exercise and FDN.       Sadia Webster, PT

## 2022-04-18 NOTE — PROGRESS NOTES
[facility]   Physical Therapy Assistant Treatment Note     Name: Tracy Najera  Clinic Number: [id number]    Therapy Diagnosis:   Encounter Diagnosis   Name Primary?    Chronic pain of left knee Yes     Physician: Marybeth Odom MD    Visit Date: 4/19/2022  Physician Orders: PT Eval and Treat   Medical Diagnosis from Referral: M25.562,G89.29 (ICD-10-CM) - Chronic pain of left knee M17.12 (ICD-10-CM) - Primary osteoarthritis of left knee      Evaluation Date: 2/15/2022  Authorization Period Expiration: 12/31/2022  Plan of Care Expiration: 05/30/2022  Progress Note Due: 10th visit  Visit # / Visits authorized: 9/20 +Eval  FOTO: 2/3     Precautions: Standard       PTA Visit #: 1/5     Time In: 10:30am  Time Out: 11:15am  Total Billable Time: 45 minutes    SUBJECTIVE     Pt reports: her left knee is feeling stiff  She was compliant with home exercise program.  Response to previous treatment: knee continues with pain   Functional change:  Nothing noted    Pain: 6/10   Location: left knee      OBJECTIVE     Objective Measures updated at progress report unless specified.     Treatment     Tracy received the treatments listed below:      therapeutic exercises to develop strength, endurance, ROM and flexibility for 45 minutes including:  Nustep 8' for ROM and strengthening  Standing heel raises 30xStep taps fwd/lat RLE only 10x  LTR x 20  Step ups onto 4inch w/LLE  Walking marches in parallel bars 2 laps  Bridging 2 x 10  Piriformis stretch  Seated Marches on LLE 2# 20xLAQ 2# 2 x 10  Shuttle DL 5B 3'  SL LLE only 3B 2'    Quad sets 20x  Straight Leg Raise 2 x 15-emphasis on quad contraction    Clamshells 2 x 10  Side lying hip abd 2 x 10          manual therapy techniques: Joint mobilizations, Manual traction and Soft tissue Mobilization were applied to the: left knee for -- minutes, including:  L Knee joint distraction    neuromuscular re-education activities to improve: Balance, Coordination,  Proprioception and Posture for -- minutes. The following activities were included:  --    hot pack for -- minutes to L knee.    cold pack for -- minutes to --.        Patient Education and Home Exercises     Home Exercises Provided and Patient Education Provided     Education provided:   - HEP and plan of care     Written Home Exercises Provided: Patient instructed to cont prior HEP. Exercises were reviewed and Tracy was able to demonstrate them prior to the end of the session.  Tracy demonstrated fair  understanding of the education provided. See EMR under Patient Instructions for exercises provided during therapy sessions    ASSESSMENT     Tracy presents to therapy without assistive device demonstrating increased shuffling requiring cues for proper gait. She has increased left knee pain with standing activities and reports tightness in left piriformis. Performed stretches with relief noted. Continue with hip and knee strengthening to further improve her left knee pain.     Tracy Is progressing well towards her goals.   Pt prognosis is Good.     Pt will continue to benefit from skilled outpatient physical therapy to address the deficits listed in the problem list box on initial evaluation, provide pt/family education and to maximize pt's level of independence in the home and community environment.     Pt's spiritual, cultural and educational needs considered and pt agreeable to plan of care and goals.     Anticipated barriers to physical therapy: none    Short Term Goals:    1. Pt will be 50% independent with HEP.  Progressing     Long Term Goals: 4 weeks   1. Pt will be 100% independent with HEP. Progressing  2. Pt will be able to perform sit to stand without pain in her knee. Progressing  3. Pt will be able to perform light duties around her house without difficulty. Progressing  4. Pt will be able to get in/out of her car without difficulty. Progressing     PLAN   Plan of care Certification: 03/23/2021 to  05/30/2022.     Outpatient Physical Therapy weekly for 4 weeks to include the following interventions: Electrical Stimulation PRN, Gait Training, Manual Therapy, Moist Heat/ Ice, Neuromuscular Re-ed, Patient Education, Self Care, Therapeutic Activities, Therapeutic Exercise and FDN.       Neena Wiley, PTA

## 2022-04-19 ENCOUNTER — CLINICAL SUPPORT (OUTPATIENT)
Dept: REHABILITATION | Facility: HOSPITAL | Age: 72
End: 2022-04-19
Payer: MEDICARE

## 2022-04-19 DIAGNOSIS — G89.29 CHRONIC PAIN OF LEFT KNEE: Primary | ICD-10-CM

## 2022-04-19 DIAGNOSIS — M25.562 CHRONIC PAIN OF LEFT KNEE: Primary | ICD-10-CM

## 2022-04-19 PROCEDURE — 97110 THERAPEUTIC EXERCISES: CPT | Mod: CQ

## 2022-04-26 ENCOUNTER — CLINICAL SUPPORT (OUTPATIENT)
Dept: REHABILITATION | Facility: HOSPITAL | Age: 72
End: 2022-04-26
Payer: MEDICARE

## 2022-04-26 DIAGNOSIS — G89.29 CHRONIC PAIN OF LEFT KNEE: Primary | ICD-10-CM

## 2022-04-26 DIAGNOSIS — M25.562 CHRONIC PAIN OF LEFT KNEE: Primary | ICD-10-CM

## 2022-04-26 PROCEDURE — 97110 THERAPEUTIC EXERCISES: CPT

## 2022-04-26 NOTE — PROGRESS NOTES
[facility]   Physical Therapy Treatment Note     Name: Tracy Najera  Clinic Number: [id number]    Therapy Diagnosis:   Encounter Diagnosis   Name Primary?    Chronic pain of left knee Yes     Physician: Marybeth Odom MD    Visit Date: 4/26/2022  Physician Orders: PT Eval and Treat   Medical Diagnosis from Referral: M25.562,G89.29 (ICD-10-CM) - Chronic pain of left knee M17.12 (ICD-10-CM) - Primary osteoarthritis of left knee      Evaluation Date: 2/15/2022  Authorization Period Expiration: 12/31/2022  Plan of Care Expiration: 05/30/2022  Progress Note Due: 10th visit  Visit # / Visits authorized: 11/20 +Eval  FOTO: 3/3     Precautions: Standard       PTA Visit #: 1/5     Time In: 9:30am  Time Out: 10:15am  Total Billable Time: 45 minutes    SUBJECTIVE     Pt reports: her left knee is feeling good and she feels like her walking is better.   She was compliant with home exercise program.  Response to previous treatment: knee continues with pain   Functional change:  Nothing noted    Pain: 6/10   Location: left knee      OBJECTIVE     Objective Measures updated at progress report unless specified.     Treatment     Tracy received the treatments listed below:      therapeutic exercises to develop strength, endurance, ROM and flexibility for 45 minutes including:  Bike 6' for ROM and strengthening  Standing heel raises 30xStep taps fwd/lat RLE only 10x  LTR x 20  Step ups onto 4inch w/LLE  Walking marches in parallel bars 2 laps  Bridging 2 x 10  Piriformis stretch  Seated Marches on LLE 2# 20xLAQ 2# 2 x 10  Shuttle DL 5B 3'  SL LLE only 3B 2'    Quad sets 20x  Straight Leg Raise 2 x 15-emphasis on quad contraction    Clamshells 2 x 10  Side lying hip abd 2 x 10                Patient Education and Home Exercises     Home Exercises Provided and Patient Education Provided     Education provided:   - HEP and plan of care     Written Home Exercises Provided: Patient instructed to cont prior  HEP. Exercises were reviewed and Tracy was able to demonstrate them prior to the end of the session.  Tracy demonstrated fair  understanding of the education provided. See EMR under Patient Instructions for exercises provided during therapy sessions    ASSESSMENT     Tracy presents to therapy without assistive device demonstrating increased shuffling requiring cues for proper gait. She has increased left knee pain with standing activities and reports tightness in left piriformis. Performed stretches with relief noted. Continue with hip and knee strengthening to further improve her left knee pain.     Tracy Is progressing well towards her goals.   Pt prognosis is Good.     Pt will continue to benefit from skilled outpatient physical therapy to address the deficits listed in the problem list box on initial evaluation, provide pt/family education and to maximize pt's level of independence in the home and community environment.     Pt's spiritual, cultural and educational needs considered and pt agreeable to plan of care and goals.     Anticipated barriers to physical therapy: none    Short Term Goals:    1. Pt will be 50% independent with HEP.  Met     Long Term Goals: 4 weeks   1. Pt will be 100% independent with HEP. Met  2. Pt will be able to perform sit to stand without pain in her knee. Met  3. Pt will be able to perform light duties around her house without difficulty. Not fully met  4. Pt will be able to get in/out of her car without difficulty. Not fully met     PLAN   See discharge summary under plan of care tab.        Sadia Webster, PT

## 2022-04-26 NOTE — PLAN OF CARE
[facility]  Physical Therapy Discharge Note    Name: Tracy Najera  Clinic Number: [id number]    Therapy Diagnosis:   Encounter Diagnosis   Name Primary?    Chronic pain of left knee Yes     Physician: Marybeth Odom MD    Physician Orders: PT Eval and Treat   Medical Diagnosis from Referral: M25.562,G89.29 (ICD-10-CM) - Chronic pain of left knee M17.12 (ICD-10-CM) - Primary osteoarthritis of left knee      Evaluation Date: 2/15/2022        Date of Last visit: 04/26/2022  Total Visits Received: 12    ASSESSMENT      Patient has improved her left knee pain and gait pattern.  She feels that her knee is better and she is ready to be discharged from therapy.     Discharge reason: Patient has met all of his/her goals    Discharge FOTO Score: 33%    Goals: Short Term Goals:    1. Pt will be 50% independent with HEP.  Met     Long Term Goals: 4 weeks   1. Pt will be 100% independent with HEP. Met  2. Pt will be able to perform sit to stand without pain in her knee. Met  3. Pt will be able to perform light duties around her house without difficulty. Not fully met  4. Pt will be able to get in/out of her car without difficulty. Not fully met      PLAN   This patient is discharged from Physical Therapy      Sadia Webster, PT

## 2022-06-08 ENCOUNTER — PATIENT MESSAGE (OUTPATIENT)
Dept: RESEARCH | Facility: HOSPITAL | Age: 72
End: 2022-06-08
Payer: MEDICARE

## 2022-06-15 ENCOUNTER — PES CALL (OUTPATIENT)
Dept: ADMINISTRATIVE | Facility: CLINIC | Age: 72
End: 2022-06-15
Payer: MEDICARE

## 2022-06-20 ENCOUNTER — PES CALL (OUTPATIENT)
Dept: ADMINISTRATIVE | Facility: CLINIC | Age: 72
End: 2022-06-20
Payer: MEDICARE

## 2022-06-24 DIAGNOSIS — I69.30 HISTORY OF CVA WITH RESIDUAL DEFICIT: ICD-10-CM

## 2022-06-24 DIAGNOSIS — I10 HYPERTENSION, UNSPECIFIED TYPE: ICD-10-CM

## 2022-06-24 NOTE — TELEPHONE ENCOUNTER
No new care gaps identified.  [facility] Embedded Care Gaps. Reference number: [id number]. 6/24/2022   5:30:30 PM CDT

## 2022-06-24 NOTE — TELEPHONE ENCOUNTER
No new care gaps identified.  [facility] Embedded Care Gaps. Reference number: [id number]. 6/24/2022   5:31:05 PM CDT

## 2022-06-24 NOTE — TELEPHONE ENCOUNTER
No new care gaps identified.  [facility] Embedded Care Gaps. Reference number: [id number]. 6/24/2022   5:31:38 PM CDT

## 2022-06-29 RX ORDER — LOSARTAN POTASSIUM 100 MG/1
100 TABLET ORAL DAILY
Qty: 90 TABLET | Refills: 1 | Status: SHIPPED | OUTPATIENT
Start: 2022-06-29 | End: 2023-01-18 | Stop reason: SDUPTHER

## 2022-06-29 RX ORDER — CLOPIDOGREL BISULFATE 75 MG/1
75 TABLET ORAL DAILY
Qty: 90 TABLET | Refills: 1 | Status: SHIPPED | OUTPATIENT
Start: 2022-06-29 | End: 2023-01-18 | Stop reason: SDUPTHER

## 2022-06-29 RX ORDER — AMLODIPINE BESYLATE 10 MG/1
10 TABLET ORAL DAILY
Qty: 90 TABLET | Refills: 1 | Status: SHIPPED | OUTPATIENT
Start: 2022-06-29 | End: 2023-01-18 | Stop reason: SDUPTHER

## 2022-07-18 ENCOUNTER — OFFICE VISIT (OUTPATIENT)
Dept: INTERNAL MEDICINE | Facility: CLINIC | Age: 72
End: 2022-07-18
Payer: MEDICARE

## 2022-07-18 ENCOUNTER — IMMUNIZATION (OUTPATIENT)
Dept: PRIMARY CARE CLINIC | Facility: CLINIC | Age: 72
End: 2022-07-18
Payer: MEDICARE

## 2022-07-18 VITALS
WEIGHT: 234.56 LBS | DIASTOLIC BLOOD PRESSURE: 86 MMHG | TEMPERATURE: 97 F | BODY MASS INDEX: 42.9 KG/M2 | SYSTOLIC BLOOD PRESSURE: 138 MMHG | RESPIRATION RATE: 18 BRPM | HEART RATE: 103 BPM | OXYGEN SATURATION: 99 %

## 2022-07-18 DIAGNOSIS — I69.30 HISTORY OF CVA WITH RESIDUAL DEFICIT: ICD-10-CM

## 2022-07-18 DIAGNOSIS — I10 HYPERTENSION, UNSPECIFIED TYPE: ICD-10-CM

## 2022-07-18 DIAGNOSIS — Z23 NEED FOR VACCINATION: Primary | ICD-10-CM

## 2022-07-18 DIAGNOSIS — M85.80 OSTEOPENIA, UNSPECIFIED LOCATION: Primary | ICD-10-CM

## 2022-07-18 DIAGNOSIS — J06.9 URTI (ACUTE UPPER RESPIRATORY INFECTION): ICD-10-CM

## 2022-07-18 PROCEDURE — 1126F AMNT PAIN NOTED NONE PRSNT: CPT | Mod: CPTII,S$GLB,, | Performed by: FAMILY MEDICINE

## 2022-07-18 PROCEDURE — 3079F PR MOST RECENT DIASTOLIC BLOOD PRESSURE 80-89 MM HG: ICD-10-PCS | Mod: CPTII,S$GLB,, | Performed by: FAMILY MEDICINE

## 2022-07-18 PROCEDURE — 3044F HG A1C LEVEL LT 7.0%: CPT | Mod: CPTII,S$GLB,, | Performed by: FAMILY MEDICINE

## 2022-07-18 PROCEDURE — 4010F ACE/ARB THERAPY RXD/TAKEN: CPT | Mod: CPTII,S$GLB,, | Performed by: FAMILY MEDICINE

## 2022-07-18 PROCEDURE — 1101F PR PT FALLS ASSESS DOC 0-1 FALLS W/OUT INJ PAST YR: ICD-10-PCS | Mod: CPTII,S$GLB,, | Performed by: FAMILY MEDICINE

## 2022-07-18 PROCEDURE — 3288F PR FALLS RISK ASSESSMENT DOCUMENTED: ICD-10-PCS | Mod: CPTII,S$GLB,, | Performed by: FAMILY MEDICINE

## 2022-07-18 PROCEDURE — 99214 OFFICE O/P EST MOD 30 MIN: CPT | Mod: S$GLB,,, | Performed by: FAMILY MEDICINE

## 2022-07-18 PROCEDURE — 3008F BODY MASS INDEX DOCD: CPT | Mod: CPTII,S$GLB,, | Performed by: FAMILY MEDICINE

## 2022-07-18 PROCEDURE — 3075F PR MOST RECENT SYSTOLIC BLOOD PRESS GE 130-139MM HG: ICD-10-PCS | Mod: CPTII,S$GLB,, | Performed by: FAMILY MEDICINE

## 2022-07-18 PROCEDURE — 4010F PR ACE/ARB THEARPY RXD/TAKEN: ICD-10-PCS | Mod: CPTII,S$GLB,, | Performed by: FAMILY MEDICINE

## 2022-07-18 PROCEDURE — 1101F PT FALLS ASSESS-DOCD LE1/YR: CPT | Mod: CPTII,S$GLB,, | Performed by: FAMILY MEDICINE

## 2022-07-18 PROCEDURE — 99214 PR OFFICE/OUTPT VISIT, EST, LEVL IV, 30-39 MIN: ICD-10-PCS | Mod: S$GLB,,, | Performed by: FAMILY MEDICINE

## 2022-07-18 PROCEDURE — 3288F FALL RISK ASSESSMENT DOCD: CPT | Mod: CPTII,S$GLB,, | Performed by: FAMILY MEDICINE

## 2022-07-18 PROCEDURE — 99999 PR PBB SHADOW E&M-EST. PATIENT-LVL III: CPT | Mod: PBBFAC,,, | Performed by: FAMILY MEDICINE

## 2022-07-18 PROCEDURE — 1126F PR PAIN SEVERITY QUANTIFIED, NO PAIN PRESENT: ICD-10-PCS | Mod: CPTII,S$GLB,, | Performed by: FAMILY MEDICINE

## 2022-07-18 PROCEDURE — 3044F PR MOST RECENT HEMOGLOBIN A1C LEVEL <7.0%: ICD-10-PCS | Mod: CPTII,S$GLB,, | Performed by: FAMILY MEDICINE

## 2022-07-18 PROCEDURE — 1159F PR MEDICATION LIST DOCUMENTED IN MEDICAL RECORD: ICD-10-PCS | Mod: CPTII,S$GLB,, | Performed by: FAMILY MEDICINE

## 2022-07-18 PROCEDURE — 99213 OFFICE O/P EST LOW 20 MIN: CPT | Mod: PBBFAC | Performed by: FAMILY MEDICINE

## 2022-07-18 PROCEDURE — 3008F PR BODY MASS INDEX (BMI) DOCUMENTED: ICD-10-PCS | Mod: CPTII,S$GLB,, | Performed by: FAMILY MEDICINE

## 2022-07-18 PROCEDURE — 99999 PR PBB SHADOW E&M-EST. PATIENT-LVL III: ICD-10-PCS | Mod: PBBFAC,,, | Performed by: FAMILY MEDICINE

## 2022-07-18 PROCEDURE — 3079F DIAST BP 80-89 MM HG: CPT | Mod: CPTII,S$GLB,, | Performed by: FAMILY MEDICINE

## 2022-07-18 PROCEDURE — 0054A COVID-19, MRNA, LNP-S, PF, 30 MCG/0.3 ML DOSE VACCINE (PFIZER): CPT | Mod: CV19,PBBFAC | Performed by: FAMILY MEDICINE

## 2022-07-18 PROCEDURE — 1159F MED LIST DOCD IN RCRD: CPT | Mod: CPTII,S$GLB,, | Performed by: FAMILY MEDICINE

## 2022-07-18 PROCEDURE — 3075F SYST BP GE 130 - 139MM HG: CPT | Mod: CPTII,S$GLB,, | Performed by: FAMILY MEDICINE

## 2022-07-18 RX ORDER — AZELASTINE 1 MG/ML
1 SPRAY, METERED NASAL 2 TIMES DAILY
Qty: 30 ML | Refills: 0 | Status: SHIPPED | OUTPATIENT
Start: 2022-07-18 | End: 2023-01-18 | Stop reason: SDUPTHER

## 2022-07-18 RX ORDER — PROMETHAZINE HYDROCHLORIDE AND DEXTROMETHORPHAN HYDROBROMIDE 6.25; 15 MG/5ML; MG/5ML
5 SYRUP ORAL EVERY 4 HOURS PRN
Qty: 180 ML | Refills: 0 | Status: SHIPPED | OUTPATIENT
Start: 2022-07-18 | End: 2022-07-28

## 2022-07-18 NOTE — PROGRESS NOTES
Subjective:       Patient ID: Tracy Najera is a 71 y.o. female.    Chief Complaint: Follow-up, Cough, and Shortness of Breath    HPI    Patient Active Problem List   Diagnosis    History of CVA with residual deficit    Hyperlipidemia    Essential hypertension    Atherosclerosis of aorta    Osteopenia    Severe obesity (BMI 35.0-39.9) with comorbidity    History of colon polyps    Bilateral lower extremity edema    Hemiparesis affecting right side as late effect of cerebrovascular accident (CVA)    Chronic pain of left knee       Past Medical History:   Diagnosis Date    Anticoagulant long-term use     Plavix    Arthritis     Bloody discharge from left nipple 6/1/2017    Cerebral artery occlusion with cerebral infarction 9/5/2013 2:35:44 PM    [facility] Historical - Cardiovascular: CVA (Cerebrovascular accident)-No Additional Notes    Complications affecting other specified body systems, hypertension 9/5/2013 2:32:20 PM    [facility] Historical - Cardiovascular: Hypertension-No Additional Notes    CVA (cerebral vascular accident) 2008    R hemiparesis, R hand contracture    HTN (hypertension)     Hyperlipidemia     Other and unspecified hyperlipidemia 9/5/2013 2:32:23 PM    [facility] Historical - Endocrine/Metabolic/Immune: Hyperlipidemia-No Additional Notes    Severe obesity (BMI 35.0-39.9) with comorbidity     Special screening for malignant neoplasms, colon 4/16/2019       Past Surgical History:   Procedure Laterality Date    BREAST BIOPSY Left 2017    COLONOSCOPY N/A 4/16/2019    Procedure: COLONOSCOPY;  Surgeon: Ulisses Jacobsen MD;  Location: [facility];  Service: Endoscopy;  Laterality: N/A;    HYSTERECTOMY      partial     TONSILLECTOMY         Family History   Problem Relation Age of Onset    Hypertension Mother     Hypertension Sister     Cancer Neg Hx     Diabetes Neg Hx     Heart disease Neg Hx     Kidney disease Neg Hx     Hyperlipidemia Neg Hx        Social  History     Tobacco Use   Smoking Status Never Smoker   Smokeless Tobacco Never Used       Wt Readings from Last 5 Encounters:   07/18/22 106.4 kg (234 lb 9.1 oz)   03/22/22 103 kg (227 lb)   02/10/22 103 kg (227 lb)   02/02/22 103.4 kg (227 lb 13.5 oz)   01/18/22 104.6 kg (230 lb 9.6 oz)       For further HPI details, see assessment and plan.    Review of Systems    Objective:      Vitals:    07/18/22 1100   BP: 138/86   Pulse: 103   Resp: 18   Temp: 97.1 °F (36.2 °C)       Physical Exam  Constitutional:       General: She is not in acute distress.     Appearance: Normal appearance. She is well-developed.   Cardiovascular:      Rate and Rhythm: Normal rate and regular rhythm.   Pulmonary:      Effort: Pulmonary effort is normal. No respiratory distress.      Breath sounds: Normal breath sounds.   Musculoskeletal:         General: Normal range of motion.   Neurological:      Mental Status: She is alert. She is not disoriented.   Psychiatric:         Mood and Affect: Mood normal.         Speech: Speech normal.         Assessment:       1. Osteopenia, unspecified location    2. History of CVA with residual deficit    3. Hypertension, unspecified type    4. URTI (acute upper respiratory infection)        Plan:   Osteopenia, unspecified location  -     DXA Bone Density Spine And Hip; Future; Expected date: 07/18/2022    History of CVA with residual deficit    Hypertension, unspecified type    URTI (acute upper respiratory infection)    Other orders  -     promethazine-dextromethorphan (PROMETHAZINE-DM) 6.25-15 mg/5 mL Syrp; Take 5 mLs by mouth every 4 (four) hours as needed.  Dispense: 180 mL; Refill: 0  -     azelastine (ASTELIN) 137 mcg (0.1 %) nasal spray; 1 spray (137 mcg total) by Nasal route 2 (two) times daily.  Dispense: 30 mL; Refill: 0        HTN  BP Readings from Last 3 Encounters:   07/18/22 138/86   02/02/22 (!) 182/80   01/18/22 133/76     Controlled  Continue meds  Amlodipine 10  Losartan 100 mg    H/o  "cva  On statin - lipitor 40  On plavix - no bleeding issues    Lab Results   Component Value Date    LDLCALC 94.2 02/28/2022    LDLCALC 94.2 02/28/2022     Lab Results   Component Value Date    ALT 12 02/28/2022    AST 13 02/28/2022    ALKPHOS 84 02/28/2022    BILITOT 0.4 02/28/2022   Continue meds      Osteopenia  dexa due  Will obtain      URTI  A few days  No fever  No chills  She finds she has some SOB w/ mask  Or with exertion.  Not " too bad "  Cough is " moderate"  Post nasal drip is present  covid tested. Negative    astelin and promethazine dm for symptom relief  If not improving f/u   Discussed role/indications of abx.    "

## 2022-07-22 ENCOUNTER — PES CALL (OUTPATIENT)
Dept: ADMINISTRATIVE | Facility: CLINIC | Age: 72
End: 2022-07-22
Payer: MEDICARE

## 2022-08-16 ENCOUNTER — APPOINTMENT (OUTPATIENT)
Dept: RADIOLOGY | Facility: HOSPITAL | Age: 72
End: 2022-08-16
Attending: FAMILY MEDICINE
Payer: MEDICARE

## 2022-08-16 DIAGNOSIS — M85.80 OSTEOPENIA, UNSPECIFIED LOCATION: ICD-10-CM

## 2022-08-16 PROCEDURE — 77080 DEXA BONE DENSITY SPINE HIP: ICD-10-PCS | Mod: 26,,, | Performed by: RADIOLOGY

## 2022-08-16 PROCEDURE — 77080 DXA BONE DENSITY AXIAL: CPT | Mod: TC

## 2022-08-16 PROCEDURE — 77080 DXA BONE DENSITY AXIAL: CPT | Mod: 26,,, | Performed by: RADIOLOGY

## 2022-08-31 ENCOUNTER — OFFICE VISIT (OUTPATIENT)
Dept: HOME HEALTH SERVICES | Facility: CLINIC | Age: 72
End: 2022-08-31
Payer: MEDICARE

## 2022-08-31 VITALS
SYSTOLIC BLOOD PRESSURE: 134 MMHG | HEART RATE: 82 BPM | TEMPERATURE: 98 F | WEIGHT: 234 LBS | HEIGHT: 62 IN | DIASTOLIC BLOOD PRESSURE: 82 MMHG | BODY MASS INDEX: 43.06 KG/M2

## 2022-08-31 DIAGNOSIS — Z86.010 HISTORY OF COLON POLYPS: ICD-10-CM

## 2022-08-31 DIAGNOSIS — Z00.00 ENCOUNTER FOR PREVENTIVE HEALTH EXAMINATION: Primary | ICD-10-CM

## 2022-08-31 DIAGNOSIS — H91.93 DECREASED HEARING OF BOTH EARS: ICD-10-CM

## 2022-08-31 DIAGNOSIS — M25.562 CHRONIC PAIN OF LEFT KNEE: ICD-10-CM

## 2022-08-31 DIAGNOSIS — M85.80 OSTEOPENIA, UNSPECIFIED LOCATION: ICD-10-CM

## 2022-08-31 DIAGNOSIS — I70.0 ATHEROSCLEROSIS OF AORTA: ICD-10-CM

## 2022-08-31 DIAGNOSIS — G89.29 CHRONIC PAIN OF LEFT KNEE: ICD-10-CM

## 2022-08-31 DIAGNOSIS — E78.5 HYPERLIPIDEMIA, UNSPECIFIED HYPERLIPIDEMIA TYPE: ICD-10-CM

## 2022-08-31 DIAGNOSIS — R60.0 BILATERAL LOWER EXTREMITY EDEMA: ICD-10-CM

## 2022-08-31 DIAGNOSIS — I69.30 HISTORY OF CVA WITH RESIDUAL DEFICIT: ICD-10-CM

## 2022-08-31 DIAGNOSIS — E66.01 CLASS 3 SEVERE OBESITY WITHOUT SERIOUS COMORBIDITY WITH BODY MASS INDEX (BMI) OF 40.0 TO 44.9 IN ADULT, UNSPECIFIED OBESITY TYPE: ICD-10-CM

## 2022-08-31 DIAGNOSIS — I69.351 HEMIPARESIS AFFECTING RIGHT SIDE AS LATE EFFECT OF CEREBROVASCULAR ACCIDENT (CVA): ICD-10-CM

## 2022-08-31 DIAGNOSIS — Z74.09 OTHER REDUCED MOBILITY: ICD-10-CM

## 2022-08-31 DIAGNOSIS — I10 ESSENTIAL HYPERTENSION: ICD-10-CM

## 2022-08-31 PROBLEM — E66.813 CLASS 3 SEVERE OBESITY IN ADULT: Status: ACTIVE | Noted: 2019-03-19

## 2022-08-31 PROCEDURE — 3288F PR FALLS RISK ASSESSMENT DOCUMENTED: ICD-10-PCS | Mod: CPTII,S$GLB,, | Performed by: NURSE PRACTITIONER

## 2022-08-31 PROCEDURE — 3079F DIAST BP 80-89 MM HG: CPT | Mod: CPTII,S$GLB,, | Performed by: NURSE PRACTITIONER

## 2022-08-31 PROCEDURE — 3079F PR MOST RECENT DIASTOLIC BLOOD PRESSURE 80-89 MM HG: ICD-10-PCS | Mod: CPTII,S$GLB,, | Performed by: NURSE PRACTITIONER

## 2022-08-31 PROCEDURE — 1126F AMNT PAIN NOTED NONE PRSNT: CPT | Mod: CPTII,S$GLB,, | Performed by: NURSE PRACTITIONER

## 2022-08-31 PROCEDURE — 1126F PR PAIN SEVERITY QUANTIFIED, NO PAIN PRESENT: ICD-10-PCS | Mod: CPTII,S$GLB,, | Performed by: NURSE PRACTITIONER

## 2022-08-31 PROCEDURE — 3044F HG A1C LEVEL LT 7.0%: CPT | Mod: CPTII,S$GLB,, | Performed by: NURSE PRACTITIONER

## 2022-08-31 PROCEDURE — 1160F RVW MEDS BY RX/DR IN RCRD: CPT | Mod: CPTII,S$GLB,, | Performed by: NURSE PRACTITIONER

## 2022-08-31 PROCEDURE — 1170F PR FUNCTIONAL STATUS ASSESSED: ICD-10-PCS | Mod: CPTII,S$GLB,, | Performed by: NURSE PRACTITIONER

## 2022-08-31 PROCEDURE — 1160F PR REVIEW ALL MEDS BY PRESCRIBER/CLIN PHARMACIST DOCUMENTED: ICD-10-PCS | Mod: CPTII,S$GLB,, | Performed by: NURSE PRACTITIONER

## 2022-08-31 PROCEDURE — 3044F PR MOST RECENT HEMOGLOBIN A1C LEVEL <7.0%: ICD-10-PCS | Mod: CPTII,S$GLB,, | Performed by: NURSE PRACTITIONER

## 2022-08-31 PROCEDURE — 3008F PR BODY MASS INDEX (BMI) DOCUMENTED: ICD-10-PCS | Mod: CPTII,S$GLB,, | Performed by: NURSE PRACTITIONER

## 2022-08-31 PROCEDURE — 1159F PR MEDICATION LIST DOCUMENTED IN MEDICAL RECORD: ICD-10-PCS | Mod: CPTII,S$GLB,, | Performed by: NURSE PRACTITIONER

## 2022-08-31 PROCEDURE — G0439 PR MEDICARE ANNUAL WELLNESS SUBSEQUENT VISIT: ICD-10-PCS | Mod: S$GLB,,, | Performed by: NURSE PRACTITIONER

## 2022-08-31 PROCEDURE — G9919 PR SCREENING AND POSITIVE: ICD-10-PCS | Mod: CPTII,S$GLB,, | Performed by: NURSE PRACTITIONER

## 2022-08-31 PROCEDURE — 1170F FXNL STATUS ASSESSED: CPT | Mod: CPTII,S$GLB,, | Performed by: NURSE PRACTITIONER

## 2022-08-31 PROCEDURE — 3075F SYST BP GE 130 - 139MM HG: CPT | Mod: CPTII,S$GLB,, | Performed by: NURSE PRACTITIONER

## 2022-08-31 PROCEDURE — 4010F PR ACE/ARB THEARPY RXD/TAKEN: ICD-10-PCS | Mod: CPTII,S$GLB,, | Performed by: NURSE PRACTITIONER

## 2022-08-31 PROCEDURE — 3008F BODY MASS INDEX DOCD: CPT | Mod: CPTII,S$GLB,, | Performed by: NURSE PRACTITIONER

## 2022-08-31 PROCEDURE — G0439 PPPS, SUBSEQ VISIT: HCPCS | Mod: S$GLB,,, | Performed by: NURSE PRACTITIONER

## 2022-08-31 PROCEDURE — 1101F PT FALLS ASSESS-DOCD LE1/YR: CPT | Mod: CPTII,S$GLB,, | Performed by: NURSE PRACTITIONER

## 2022-08-31 PROCEDURE — 3075F PR MOST RECENT SYSTOLIC BLOOD PRESS GE 130-139MM HG: ICD-10-PCS | Mod: CPTII,S$GLB,, | Performed by: NURSE PRACTITIONER

## 2022-08-31 PROCEDURE — 4010F ACE/ARB THERAPY RXD/TAKEN: CPT | Mod: CPTII,S$GLB,, | Performed by: NURSE PRACTITIONER

## 2022-08-31 PROCEDURE — G9919 SCRN ND POS ND PROV OF REC: HCPCS | Mod: CPTII,S$GLB,, | Performed by: NURSE PRACTITIONER

## 2022-08-31 PROCEDURE — 3288F FALL RISK ASSESSMENT DOCD: CPT | Mod: CPTII,S$GLB,, | Performed by: NURSE PRACTITIONER

## 2022-08-31 PROCEDURE — 1159F MED LIST DOCD IN RCRD: CPT | Mod: CPTII,S$GLB,, | Performed by: NURSE PRACTITIONER

## 2022-08-31 PROCEDURE — 1101F PR PT FALLS ASSESS DOC 0-1 FALLS W/OUT INJ PAST YR: ICD-10-PCS | Mod: CPTII,S$GLB,, | Performed by: NURSE PRACTITIONER

## 2022-08-31 NOTE — PATIENT INSTRUCTIONS
Counseling and Referral of Other Preventative  (Italic type indicates deductible and co-insurance are waived)    Patient Name: Tracy Najera  Today's Date: 8/31/2022    Health Maintenance       Date Due Completion Date    Influenza Vaccine (1) 09/01/2022 10/30/2020    Mammogram 02/28/2023 2/28/2022    Override on 8/3/2015: Done ([facility] at [facility])    Override on 10/6/2014: Done (Done at [facility]--exact date unknown)    Override on 9/11/2013: (N/S)    Override on 8/19/2012: (N/S)    Lipid Panel 02/28/2023 2/28/2022    High Dose Statin 08/31/2023 8/31/2022    Colorectal Cancer Screening 04/16/2024 4/16/2019    DEXA Scan 08/16/2025 8/16/2022    Override on 11/2/2015: Done (Exact date unknown--done at [facility])    TETANUS VACCINE 10/29/2025 10/29/2015        No orders of the defined types were placed in this encounter.    The following information is provided to all patients.  This information is to help you find resources for any of the problems found today that may be affecting your health:                Living healthy guide: www.[facility].[place].gov      Understanding Diabetes: www.diabetes.org      Eating healthy: www.cdc.gov/healthyweight      CDC home safety checklist: www.cdc.gov/steadi/patient.html      Agency on Aging: www.goea.[place].[facility]      Alcoholics anonymous (AA): www.aa.org      Physical Activity: www.elder.nih.gov/vz2iumq      Tobacco use: www.quitwithusla.org

## 2022-08-31 NOTE — PROGRESS NOTES
"Tracy Najera presented for Medicare AW today. The following components were reviewed and updated:    Medical history  Family History  Social history  Allergies and Current Medications  Health Risk Assessment  Health Maintenance  Care Team    **See Completed Assessments for Annual Wellness visit with in the encounter summary    The following assessments were completed:  Depression Screening  Cognitive function Screening      Timed Get Up Test  Whisper Test    Vitals:    08/31/22 1022   BP: 134/82   Pulse: 82   Temp: 97.9 °F (36.6 °C)   TempSrc: Temporal   Weight: 106.1 kg (234 lb)   Height: 5' 2" (1.575 m)   PF: 95 L/min     Body mass index is 42.8 kg/m².   ]    Physical Exam  Vitals reviewed.   Constitutional:       Appearance: Normal appearance. She is obese.   HENT:      Head: Normocephalic and atraumatic.   Cardiovascular:      Rate and Rhythm: Normal rate and regular rhythm.      Pulses: Normal pulses.      Heart sounds: Normal heart sounds.   Pulmonary:      Effort: Pulmonary effort is normal.      Breath sounds: Normal breath sounds.   Musculoskeletal:         General: Normal range of motion.      Cervical back: Normal range of motion and neck supple.      Right lower leg: Edema (2+) present.      Left lower leg: Edema (1+) present.   Skin:     General: Skin is warm and dry.   Neurological:      Mental Status: She is alert and oriented to person, place, and time.      Comments: Slight weakness to the right arm and leg   Psychiatric:         Mood and Affect: Mood normal.         Behavior: Behavior normal.        Outpatient Medications Marked as Taking for the 8/31/22 encounter (Office Visit) with EMMA Deutsch   Medication Sig Dispense Refill    amLODIPine (NORVASC) 10 MG tablet Take 1 tablet (10 mg total) by mouth once daily. 90 tablet 1    atorvastatin (LIPITOR) 40 MG tablet Take 1 tablet (40 mg total) by mouth once daily. 90 tablet 1    azelastine (ASTELIN) 137 mcg (0.1 %) nasal spray 1 spray (137 " mcg total) by Nasal route 2 (two) times daily. 30 mL 0    cholecalciferol, vitamin D3, (VITAMIN D3) 50 mcg (2,000 unit) Cap capsule Take 2,000 Units by mouth once daily.      clopidogreL (PLAVIX) 75 mg tablet Take 1 tablet (75 mg total) by mouth once daily. 90 tablet 1    fluticasone propionate (FLONASE) 50 mcg/actuation nasal spray 2 SPRAYS (100 MCG TOTAL) BY EACH NOSTRIL ROUTE ONCE DAILY. 48 mL 3    guaifenesin/dextromethorphan (CORICIDIN HBP CHEST TEAGAN-COUGH ORAL) Take 1 tablet by mouth daily as needed (cough).      losartan (COZAAR) 100 MG tablet Take 1 tablet (100 mg total) by mouth once daily. 90 tablet 1        Diagnoses and health risks identified today and associated recommendations/orders:  1. Encounter for preventive health examination  Medicare aw complete. Health maintenance:  up to date. Discussed upcoming flu vaccine.     2. Hemiparesis affecting right side as late effect of cerebrovascular accident (CVA)  Chronic. On lipitor and plavix for secondary prevention. Continue exercise. Fall precautions recommended. Follow up with PCP.      3. Class 3 severe obesity without serious comorbidity with body mass index (BMI) of 40.0 to 44.9 in adult, unspecified obesity type  This problem is currently not controlled. Instructed patient to diet and exercise to lose weight. Follow up with your PCP as planned to discuss adjustments to your treatment plan.      4. Atherosclerosis of aorta  Chronic and stable on current management. See med list above. Follow up with PCP.      5. Essential hypertension  Chronic and stable on current management. See med list above. Recommend low sodium diet. Follow up with PCP.       6. Bilateral lower extremity edema  Chronic and stable on current management. See med list above. Low sodium diet, elevate legs, compression stockings. Follow up with PCP.      7. History of CVA with residual deficit  See #2. On lipitor and plavix for secondary prevention. F/u with pcp,     8. Decreased  hearing of both ears  Chronic. F/u with pcp.     9. Other reduced mobility  Chronic. Fall precautions recommended. Follow up with PCP.      10. Hyperlipidemia, unspecified hyperlipidemia type  Lab Results   Component Value Date    CHOL 158 02/28/2022    CHOL 158 02/28/2022    CHOL 168 06/24/2020     Lab Results   Component Value Date    HDL 48 02/28/2022    HDL 48 02/28/2022    HDL 55 06/24/2020     Lab Results   Component Value Date    LDLCALC 94.2 02/28/2022    LDLCALC 94.2 02/28/2022    LDLCALC 97.2 06/24/2020     Lab Results   Component Value Date    TRIG 79 02/28/2022    TRIG 79 02/28/2022    TRIG 79 06/24/2020     Lab Results   Component Value Date    CHOLHDL 30.4 02/28/2022    CHOLHDL 30.4 02/28/2022    CHOLHDL 32.7 06/24/2020    Chronic and stable on current management. See med list above. Follow up with PCP.      11. Osteopenia, unspecified location  Chronic and stable on current management. Continue vitamin d, calcium in the diet, and weight bearing exercise. See med list above. Follow up with PCP.       12. History of colon polyps  Next colonoscopy due 2024.     13. Chronic pain of left knee  Chronic and stable on current management. See med list above. Follow up with PCP.          Provided Tracy with a 5-10 year written screening schedule and personal prevention plan. Recommendations were developed using the USPSTF age appropriate recommendations. Education, counseling, and referrals were provided as needed.  After Visit Summary printed and given to patient which includes a list of additional screenings\tests needed.    Follow up in about 1 year (around 8/31/2023) for annual wellness visit.      EMMA Deutsch      I offered to discuss advanced care planning, including how to pick a person who would make decisions for you if you were unable to make them for yourself, called a health care power of , and what kind of decisions you might make such as use of life sustaining treatments such as  ventilators and tube feeding when faced with a life limiting illness recorded on a living will that they will need to know. (How you want to be cared for as you near the end of your natural life)     X  Patient has advanced directives written and agrees to provide copies to the institution.    Addendum:  Review for Opioid Screening: Pt does not have Rx for Opioids (If patient has Rx list here)      Review for Substance Use Disorders: Patient does not use substance

## 2022-09-06 ENCOUNTER — CLINICAL SUPPORT (OUTPATIENT)
Dept: INTERNAL MEDICINE | Facility: CLINIC | Age: 72
End: 2022-09-06
Payer: MEDICARE

## 2022-09-06 VITALS
DIASTOLIC BLOOD PRESSURE: 88 MMHG | RESPIRATION RATE: 18 BRPM | HEART RATE: 68 BPM | SYSTOLIC BLOOD PRESSURE: 118 MMHG | OXYGEN SATURATION: 98 %

## 2022-09-06 PROCEDURE — 99999 PR PBB SHADOW E&M-EST. PATIENT-LVL II: ICD-10-PCS | Mod: PBBFAC,,,

## 2022-09-06 PROCEDURE — 99999 PR PBB SHADOW E&M-EST. PATIENT-LVL II: CPT | Mod: PBBFAC,,,

## 2022-09-06 NOTE — PROGRESS NOTES
Patient presents for BP nurse visit. AAOx3. Verified name and . Patient brought her electronic BP cuff to be checked against manual. Patient manual was 118/88. Electronic cuff was deemed to be too small as per the cuff range recommendations. Escorted her to the [facility] to obtain a larger cuff. Patient verbalized understanding of visit and results. CYNTHIA

## 2022-10-03 ENCOUNTER — HOSPITAL ENCOUNTER (EMERGENCY)
Facility: HOSPITAL | Age: 72
Discharge: HOME | End: 2022-10-03
Attending: EMERGENCY MEDICINE
Payer: MEDICARE

## 2022-10-03 VITALS
SYSTOLIC BLOOD PRESSURE: 171 MMHG | HEART RATE: 92 BPM | TEMPERATURE: 99 F | DIASTOLIC BLOOD PRESSURE: 92 MMHG | OXYGEN SATURATION: 95 % | HEIGHT: 62 IN | RESPIRATION RATE: 20 BRPM | BODY MASS INDEX: 42.8 KG/M2

## 2022-10-03 DIAGNOSIS — M79.606 LEG PAIN: Primary | ICD-10-CM

## 2022-10-03 PROCEDURE — 99283 EMERGENCY DEPT VISIT LOW MDM: CPT

## 2022-10-03 NOTE — ED PROVIDER NOTES
Encounter Date: 10/3/2022       History     Chief Complaint   Patient presents with    Leg Pain     Pain to right lower leg x 5 days, pain with walking. States unknown if any trauma.     72-year-old female presents emergency department with complaints of right lower leg pain that began 5 days ago after falling out of the bed.  Patient denies any other injuries or symptoms at this time.    The history is provided by the patient.   Review of patient's allergies indicates:  No Known Allergies  Past Medical History:   Diagnosis Date    Anticoagulant long-term use     Plavix    Arthritis     Bloody discharge from left nipple 6/1/2017    Cerebral artery occlusion with cerebral infarction 9/5/2013 2:35:44 PM    [facility] Historical - Cardiovascular: CVA (Cerebrovascular accident)-No Additional Notes    Complications affecting other specified body systems, hypertension 9/5/2013 2:32:20 PM    [facility] Historical - Cardiovascular: Hypertension-No Additional Notes    CVA (cerebral vascular accident) 2008    R hemiparesis, R hand contracture    HTN (hypertension)     Hyperlipidemia     Other and unspecified hyperlipidemia 9/5/2013 2:32:23 PM    [facility] Historical - Endocrine/Metabolic/Immune: Hyperlipidemia-No Additional Notes    Severe obesity (BMI 35.0-39.9) with comorbidity     Special screening for malignant neoplasms, colon 4/16/2019     Past Surgical History:   Procedure Laterality Date    BREAST BIOPSY Left 2017    COLONOSCOPY N/A 4/16/2019    Procedure: COLONOSCOPY;  Surgeon: Ulisses Jacobsen MD;  Location: [facility];  Service: Endoscopy;  Laterality: N/A;    HYSTERECTOMY      partial     TONSILLECTOMY       Family History   Problem Relation Age of Onset    Hypertension Mother     Hypertension Sister     Cancer Neg Hx     Diabetes Neg Hx     Heart disease Neg Hx     Kidney disease Neg Hx     Hyperlipidemia Neg Hx      Social History     Tobacco Use    Smoking status: Never    Smokeless tobacco: Never    Substance Use Topics    Alcohol use: Yes     Comment: 1 glass of wine or 1 beer once every 2-3 months    Drug use: No     Review of Systems   Constitutional:  Negative for fever.   HENT:  Negative for sore throat.    Respiratory:  Negative for shortness of breath.    Cardiovascular:  Negative for chest pain.   Gastrointestinal:  Negative for nausea.   Genitourinary:  Negative for dysuria.   Musculoskeletal:  Positive for arthralgias and myalgias. Negative for back pain.   Skin:  Negative for rash.   Neurological:  Negative for weakness.   Hematological:  Does not bruise/bleed easily.   All other systems reviewed and are negative.    Physical Exam     Initial Vitals [10/03/22 1022]   BP Pulse Resp Temp SpO2   (!) 171/92 92 20 98.8 °F (37.1 °C) 95 %      MAP       --         Physical Exam    Constitutional: She appears well-developed and well-nourished. She is not diaphoretic. No distress.   HENT:   Head: Normocephalic and atraumatic.   Eyes: Conjunctivae and EOM are normal. Pupils are equal, round, and reactive to light.   Neck: Neck supple.   Normal range of motion.  Cardiovascular:  Normal rate, regular rhythm and normal heart sounds.           No murmur heard.  Pulmonary/Chest: Breath sounds normal. No respiratory distress. She has no wheezes. She has no rales.   Abdominal: Abdomen is soft. Bowel sounds are normal. There is no abdominal tenderness. There is no rebound and no guarding.   Musculoskeletal:         General: No tenderness. Normal range of motion.      Cervical back: Normal range of motion and neck supple.      Right lower leg: No tenderness. 1+ Edema present.      Left lower leg: No tenderness. 1+ Edema present.      Comments: Negative Julia sign, no calf tenderness, no erythema     Neurological: She is alert and oriented to person, place, and time. No cranial nerve deficit. GCS score is 15. GCS eye subscore is 4. GCS verbal subscore is 5. GCS motor subscore is 6.   Skin: Skin is warm and dry.  Capillary refill takes less than 2 seconds.   Psychiatric: She has a normal mood and affect. Thought content normal.       ED Course   Procedures  Labs Reviewed - No data to display       Imaging Results              X-Ray Tibia Fibula 2 View Right (Final result)  Result time 10/03/22 12:28:52      Final result by Bear Ha MD (10/03/22 12:28:52)                   Impression:      No acute abnormality identified.      Electronically signed by: Bear Ha  Date:    10/03/2022  Time:    12:28               Narrative:    EXAMINATION:  XR TIBIA FIBULA 2 VIEW RIGHT    CLINICAL HISTORY:  Pain in leg, unspecified    TECHNIQUE:  AP and lateral views of the right tibia and fibula were performed.    COMPARISON:  None    FINDINGS:  No evidence of fracture or dislocation.  Soft tissues unremarkable. No radiopaque foreign body.                                       Medications - No data to display            I discussed with patient and/or family/caretaker that negative X-ray does not rule out occult fracture or other soft tissue injury.  Persistent pain greater than 7-10 days or increased pain requires follow up, specifically with orthopedics.                 Clinical Impression:   Final diagnoses:  [M79.606] Leg pain (Primary)        ED Disposition Condition    Discharge Stable          ED Prescriptions    None       Follow-up Information       Follow up With Specialties Details Why Contact Info    Mike Oakes MD Family Medicine In 1 week  [address] [facility] [id number] [phone]               Eliseo St Jr., FNP  10/03/22 5121

## 2022-10-03 NOTE — FIRST PROVIDER EVALUATION
Medical screening examination initiated.  I have conducted a focused provider triage encounter, findings are as follows:    Brief history of present illness:  72 year old female with complaint of right lower leg pain with walking X 2-3 days.  No fall or trauma.     There were no vitals filed for this visit.    Pertinent physical exam: right lateral lower leg tenderness

## 2022-11-03 ENCOUNTER — PATIENT OUTREACH (OUTPATIENT)
Dept: ADMINISTRATIVE | Facility: OTHER | Age: 72
End: 2022-11-03
Payer: MEDICARE

## 2022-11-03 NOTE — PROGRESS NOTES
CHW - Initial Contact    This Community Health Worker completed OR updated the Social Determinant of Health questionnaire with patient via telephone today.    Pt identified barriers of most importance are: Food Insecurity   Referrals to community agencies completed with patient/caregiver consent outside of [facility] include: No  Referrals were put through [facility] - no  Support and Services: None  Other information discussed the patient needs / wants help with: SDOH  Follow up required: Yes  Follow-up Outreach - Due: 11/9/2022

## 2022-11-10 ENCOUNTER — PATIENT OUTREACH (OUTPATIENT)
Dept: ADMINISTRATIVE | Facility: OTHER | Age: 72
End: 2022-11-10
Payer: MEDICARE

## 2022-11-29 NOTE — PROGRESS NOTES
CHW - Case Closure    This Community Health Worker spoke to patient via telephone today.   Pt/Caregiver reported: High utility bill places strain on grocery bill making it hard to but food. Will put in for SNAP benefits.  Pt/Caregiver denied any additional needs at this time and agrees with episode closure at this time.  Provided patient with Community Health Worker's contact information and encouraged him/her to contact this Community Health Worker if additional needs arise.

## 2023-01-03 ENCOUNTER — HOSPITAL ENCOUNTER (EMERGENCY)
Facility: HOSPITAL | Age: 73
Discharge: HOME | End: 2023-01-03
Attending: EMERGENCY MEDICINE
Payer: MEDICARE

## 2023-01-03 VITALS
WEIGHT: 240.75 LBS | RESPIRATION RATE: 18 BRPM | BODY MASS INDEX: 44.03 KG/M2 | TEMPERATURE: 99 F | SYSTOLIC BLOOD PRESSURE: 193 MMHG | OXYGEN SATURATION: 98 % | HEART RATE: 110 BPM | DIASTOLIC BLOOD PRESSURE: 93 MMHG
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
## 2023-01-03 DIAGNOSIS — M54.2 NECK PAIN: ICD-10-CM

## 2023-01-03 PROCEDURE — 99283 EMERGENCY DEPT VISIT LOW MDM: CPT | Mod: HCNC

## 2023-01-03 RX ORDER — TIZANIDINE 2 MG/1
2 TABLET ORAL EVERY 8 HOURS PRN
Qty: 15 TABLET | Refills: 0 | Status: SHIPPED | OUTPATIENT
Start: 2023-01-03 | End: 2023-01-18 | Stop reason: ALTCHOICE
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
## 2023-01-18 ENCOUNTER — OFFICE VISIT (OUTPATIENT)
Dept: INTERNAL MEDICINE | Facility: CLINIC | Age: 73
End: 2023-01-18
Payer: MEDICARE

## 2023-01-18 ENCOUNTER — LAB VISIT (OUTPATIENT)
Dept: LAB | Facility: HOSPITAL | Age: 73
End: 2023-01-18
Attending: PHYSICIAN ASSISTANT
Payer: MEDICARE

## 2023-01-18 VITALS
OXYGEN SATURATION: 99 % | HEART RATE: 97 BPM | RESPIRATION RATE: 21 BRPM | WEIGHT: 238.19 LBS | DIASTOLIC BLOOD PRESSURE: 88 MMHG | TEMPERATURE: 98 F | SYSTOLIC BLOOD PRESSURE: 132 MMHG | BODY MASS INDEX: 43.83 KG/M2 | HEIGHT: 62 IN

## 2023-01-18 DIAGNOSIS — I70.0 CALCIFICATION OF AORTA: ICD-10-CM

## 2023-01-18 DIAGNOSIS — I69.351 HEMIPARESIS AFFECTING RIGHT SIDE AS LATE EFFECT OF CEREBROVASCULAR ACCIDENT (CVA): ICD-10-CM

## 2023-01-18 DIAGNOSIS — Z12.31 ENCOUNTER FOR SCREENING MAMMOGRAM FOR BREAST CANCER: ICD-10-CM

## 2023-01-18 DIAGNOSIS — M85.89 OSTEOPENIA OF MULTIPLE SITES: ICD-10-CM

## 2023-01-18 DIAGNOSIS — E66.01 CLASS 3 SEVERE OBESITY DUE TO EXCESS CALORIES WITH SERIOUS COMORBIDITY AND BODY MASS INDEX (BMI) OF 40.0 TO 44.9 IN ADULT: ICD-10-CM

## 2023-01-18 DIAGNOSIS — E78.5 HYPERLIPIDEMIA, UNSPECIFIED HYPERLIPIDEMIA TYPE: ICD-10-CM

## 2023-01-18 DIAGNOSIS — R79.9 ABNORMAL FINDING OF BLOOD CHEMISTRY, UNSPECIFIED: ICD-10-CM

## 2023-01-18 DIAGNOSIS — I10 ESSENTIAL HYPERTENSION: ICD-10-CM

## 2023-01-18 DIAGNOSIS — J31.0 CHRONIC RHINITIS: ICD-10-CM

## 2023-01-18 DIAGNOSIS — Z00.00 ROUTINE GENERAL MEDICAL EXAMINATION AT A HEALTH CARE FACILITY: Primary | ICD-10-CM

## 2023-01-18 LAB
ALBUMIN SERPL BCP-MCNC: 4.1 G/DL (ref 3.5–5.2)
ALP SERPL-CCNC: 95 U/L (ref 55–135)
ALT SERPL W/O P-5'-P-CCNC: 17 U/L (ref 10–44)
ANION GAP SERPL CALC-SCNC: 8 MMOL/L (ref 8–16)
AST SERPL-CCNC: 14 U/L (ref 10–40)
BASOPHILS # BLD AUTO: 0.04 K/UL (ref 0–0.2)
BASOPHILS NFR BLD: 0.4 % (ref 0–1.9)
BILIRUB SERPL-MCNC: 0.4 MG/DL (ref 0.1–1)
BUN SERPL-MCNC: 15 MG/DL (ref 8–23)
CALCIUM SERPL-MCNC: 10.1 MG/DL (ref 8.7–10.5)
CHLORIDE SERPL-SCNC: 105 MMOL/L (ref 95–110)
CHOLEST SERPL-MCNC: 177 MG/DL (ref 120–199)
CHOLEST/HDLC SERPL: 3.7 {RATIO} (ref 2–5)
CO2 SERPL-SCNC: 25 MMOL/L (ref 23–29)
CREAT SERPL-MCNC: 0.9 MG/DL (ref 0.5–1.4)
DIFFERENTIAL METHOD: ABNORMAL
EOSINOPHIL # BLD AUTO: 0 K/UL (ref 0–0.5)
EOSINOPHIL NFR BLD: 0.4 % (ref 0–8)
ERYTHROCYTE [DISTWIDTH] IN BLOOD BY AUTOMATED COUNT: 14.3 % (ref 11.5–14.5)
EST. GFR  (NO RACE VARIABLE): >60 ML/MIN/1.73 M^2
ESTIMATED AVG GLUCOSE: 117 MG/DL (ref 68–131)
GLUCOSE SERPL-MCNC: 95 MG/DL (ref 70–110)
HBA1C MFR BLD: 5.7 % (ref 4–5.6)
HCT VFR BLD AUTO: 37.9 % (ref 37–48.5)
HDLC SERPL-MCNC: 48 MG/DL (ref 40–75)
HDLC SERPL: 27.1 % (ref 20–50)
HGB BLD-MCNC: 11.6 G/DL (ref 12–16)
IMM GRANULOCYTES # BLD AUTO: 0.04 K/UL (ref 0–0.04)
IMM GRANULOCYTES NFR BLD AUTO: 0.4 % (ref 0–0.5)
LDLC SERPL CALC-MCNC: 109 MG/DL (ref 63–159)
LYMPHOCYTES # BLD AUTO: 2.4 K/UL (ref 1–4.8)
LYMPHOCYTES NFR BLD: 24.1 % (ref 18–48)
MCH RBC QN AUTO: 28 PG (ref 27–31)
MCHC RBC AUTO-ENTMCNC: 30.6 G/DL (ref 32–36)
MCV RBC AUTO: 92 FL (ref 82–98)
MONOCYTES # BLD AUTO: 0.8 K/UL (ref 0.3–1)
MONOCYTES NFR BLD: 7.7 % (ref 4–15)
NEUTROPHILS # BLD AUTO: 6.8 K/UL (ref 1.8–7.7)
NEUTROPHILS NFR BLD: 67 % (ref 38–73)
NONHDLC SERPL-MCNC: 129 MG/DL
NRBC BLD-RTO: 0 /100 WBC
PLATELET # BLD AUTO: 334 K/UL (ref 150–450)
PMV BLD AUTO: 12.5 FL (ref 9.2–12.9)
POTASSIUM SERPL-SCNC: 3.7 MMOL/L (ref 3.5–5.1)
PROT SERPL-MCNC: 8.1 G/DL (ref 6–8.4)
RBC # BLD AUTO: 4.14 M/UL (ref 4–5.4)
SODIUM SERPL-SCNC: 138 MMOL/L (ref 136–145)
TRIGL SERPL-MCNC: 100 MG/DL (ref 30–150)
TSH SERPL DL<=0.005 MIU/L-ACNC: 1.68 UIU/ML (ref 0.4–4)
WBC # BLD AUTO: 10.06 K/UL (ref 3.9–12.7)

## 2023-01-18 PROCEDURE — 3008F PR BODY MASS INDEX (BMI) DOCUMENTED: ICD-10-PCS | Mod: HCNC,CPTII,S$GLB, | Performed by: PHYSICIAN ASSISTANT

## 2023-01-18 PROCEDURE — 85025 COMPLETE CBC W/AUTO DIFF WBC: CPT | Mod: HCNC | Performed by: PHYSICIAN ASSISTANT

## 2023-01-18 PROCEDURE — 3075F PR MOST RECENT SYSTOLIC BLOOD PRESS GE 130-139MM HG: ICD-10-PCS | Mod: HCNC,CPTII,S$GLB, | Performed by: PHYSICIAN ASSISTANT

## 2023-01-18 PROCEDURE — 36415 COLL VENOUS BLD VENIPUNCTURE: CPT | Mod: HCNC | Performed by: PHYSICIAN ASSISTANT

## 2023-01-18 PROCEDURE — 99999 PR PBB SHADOW E&M-EST. PATIENT-LVL III: CPT | Mod: PBBFAC,HCNC,, | Performed by: PHYSICIAN ASSISTANT

## 2023-01-18 PROCEDURE — 1159F MED LIST DOCD IN RCRD: CPT | Mod: HCNC,CPTII,S$GLB, | Performed by: PHYSICIAN ASSISTANT

## 2023-01-18 PROCEDURE — 3288F PR FALLS RISK ASSESSMENT DOCUMENTED: ICD-10-PCS | Mod: HCNC,CPTII,S$GLB, | Performed by: PHYSICIAN ASSISTANT

## 2023-01-18 PROCEDURE — 80053 COMPREHEN METABOLIC PANEL: CPT | Mod: HCNC | Performed by: PHYSICIAN ASSISTANT

## 2023-01-18 PROCEDURE — 99397 PER PM REEVAL EST PAT 65+ YR: CPT | Mod: HCNC,S$GLB,, | Performed by: PHYSICIAN ASSISTANT

## 2023-01-18 PROCEDURE — 3008F BODY MASS INDEX DOCD: CPT | Mod: HCNC,CPTII,S$GLB, | Performed by: PHYSICIAN ASSISTANT

## 2023-01-18 PROCEDURE — 80061 LIPID PANEL: CPT | Mod: HCNC | Performed by: PHYSICIAN ASSISTANT

## 2023-01-18 PROCEDURE — 84443 ASSAY THYROID STIM HORMONE: CPT | Mod: HCNC | Performed by: PHYSICIAN ASSISTANT

## 2023-01-18 PROCEDURE — 3075F SYST BP GE 130 - 139MM HG: CPT | Mod: HCNC,CPTII,S$GLB, | Performed by: PHYSICIAN ASSISTANT

## 2023-01-18 PROCEDURE — 1160F PR REVIEW ALL MEDS BY PRESCRIBER/CLIN PHARMACIST DOCUMENTED: ICD-10-PCS | Mod: HCNC,CPTII,S$GLB, | Performed by: PHYSICIAN ASSISTANT

## 2023-01-18 PROCEDURE — 1160F RVW MEDS BY RX/DR IN RCRD: CPT | Mod: HCNC,CPTII,S$GLB, | Performed by: PHYSICIAN ASSISTANT

## 2023-01-18 PROCEDURE — 83036 HEMOGLOBIN GLYCOSYLATED A1C: CPT | Mod: HCNC | Performed by: PHYSICIAN ASSISTANT

## 2023-01-18 PROCEDURE — 3079F DIAST BP 80-89 MM HG: CPT | Mod: HCNC,CPTII,S$GLB, | Performed by: PHYSICIAN ASSISTANT

## 2023-01-18 PROCEDURE — 1101F PT FALLS ASSESS-DOCD LE1/YR: CPT | Mod: HCNC,CPTII,S$GLB, | Performed by: PHYSICIAN ASSISTANT

## 2023-01-18 PROCEDURE — 99999 PR PBB SHADOW E&M-EST. PATIENT-LVL III: ICD-10-PCS | Mod: PBBFAC,HCNC,, | Performed by: PHYSICIAN ASSISTANT

## 2023-01-18 PROCEDURE — 1126F AMNT PAIN NOTED NONE PRSNT: CPT | Mod: HCNC,CPTII,S$GLB, | Performed by: PHYSICIAN ASSISTANT

## 2023-01-18 PROCEDURE — 99397 PR PREVENTIVE VISIT,EST,65 & OVER: ICD-10-PCS | Mod: HCNC,S$GLB,, | Performed by: PHYSICIAN ASSISTANT

## 2023-01-18 PROCEDURE — 1159F PR MEDICATION LIST DOCUMENTED IN MEDICAL RECORD: ICD-10-PCS | Mod: HCNC,CPTII,S$GLB, | Performed by: PHYSICIAN ASSISTANT

## 2023-01-18 PROCEDURE — 3288F FALL RISK ASSESSMENT DOCD: CPT | Mod: HCNC,CPTII,S$GLB, | Performed by: PHYSICIAN ASSISTANT

## 2023-01-18 PROCEDURE — 1126F PR PAIN SEVERITY QUANTIFIED, NO PAIN PRESENT: ICD-10-PCS | Mod: HCNC,CPTII,S$GLB, | Performed by: PHYSICIAN ASSISTANT

## 2023-01-18 PROCEDURE — 3079F PR MOST RECENT DIASTOLIC BLOOD PRESSURE 80-89 MM HG: ICD-10-PCS | Mod: HCNC,CPTII,S$GLB, | Performed by: PHYSICIAN ASSISTANT

## 2023-01-18 PROCEDURE — 1101F PR PT FALLS ASSESS DOC 0-1 FALLS W/OUT INJ PAST YR: ICD-10-PCS | Mod: HCNC,CPTII,S$GLB, | Performed by: PHYSICIAN ASSISTANT

## 2023-01-18 RX ORDER — AMLODIPINE BESYLATE 10 MG/1
10 TABLET ORAL DAILY
Qty: 90 TABLET | Refills: 3 | Status: SHIPPED | OUTPATIENT
Start: 2023-01-18 | End: 2023-10-09

## 2023-01-18 RX ORDER — AZELASTINE 1 MG/ML
1 SPRAY, METERED NASAL 2 TIMES DAILY
Qty: 30 ML | Refills: 3 | Status: SHIPPED | OUTPATIENT
Start: 2023-01-18 | End: 2023-06-09

## 2023-01-18 RX ORDER — ATORVASTATIN CALCIUM 40 MG/1
40 TABLET, FILM COATED ORAL DAILY
Qty: 90 TABLET | Refills: 3 | Status: SHIPPED | OUTPATIENT
Start: 2023-01-18 | End: 2023-11-27

## 2023-01-18 RX ORDER — LOSARTAN POTASSIUM 100 MG/1
100 TABLET ORAL DAILY
Qty: 90 TABLET | Refills: 3 | Status: SHIPPED | OUTPATIENT
Start: 2023-01-18 | End: 2023-01-23

## 2023-01-18 RX ORDER — CLOPIDOGREL BISULFATE 75 MG/1
75 TABLET ORAL DAILY
Qty: 90 TABLET | Refills: 3 | Status: SHIPPED | OUTPATIENT
Start: 2023-01-18 | End: 2023-10-09

## 2023-01-18 RX ORDER — FLUTICASONE PROPIONATE 50 MCG
1 SPRAY, SUSPENSION (ML) NASAL 2 TIMES DAILY
Qty: 40 ML | Refills: 3 | Status: SHIPPED | OUTPATIENT
Start: 2023-01-18 | End: 2023-11-27

## 2023-01-18 NOTE — ASSESSMENT & PLAN NOTE
Wt Readings from Last 10 Encounters:   01/18/23 108 kg (238 lb 3.3 oz)   01/03/23 109.2 kg (240 lb 11.9 oz)   08/31/22 106.1 kg (234 lb)   07/18/22 106.4 kg (234 lb 9.1 oz)   03/22/22 103 kg (227 lb)   02/10/22 103 kg (227 lb)   02/02/22 103.4 kg (227 lb 13.5 oz)   01/18/22 104.6 kg (230 lb 9.6 oz)   07/15/21 99.7 kg (219 lb 12.8 oz)   03/19/21 98 kg (216 lb)     Body mass index is 43.57 kg/m².  Counseled on diet and exercise.

## 2023-01-18 NOTE — PROGRESS NOTES
Subjective:       Patient ID: Tracy Najera is a 72 y.o. female.    Chief Complaint: Annual Exam      Patient presents to clinic today for annual physical exam.      Review of Systems   Constitutional:  Negative for chills, fatigue, fever and unexpected weight change.   HENT:  Negative for congestion, dental problem, ear pain, hearing loss, rhinorrhea and trouble swallowing.    Eyes:  Negative for pain and visual disturbance.   Respiratory:  Negative for cough and shortness of breath.    Cardiovascular:  Negative for chest pain, palpitations and leg swelling.   Gastrointestinal:  Negative for abdominal distention, abdominal pain, blood in stool, constipation, diarrhea, nausea and vomiting.   Genitourinary:  Negative for difficulty urinating and vaginal discharge.   Musculoskeletal:  Negative for arthralgias and myalgias.   Skin:  Negative for rash.   Neurological:  Negative for dizziness, weakness, numbness and headaches.   Hematological:  Negative for adenopathy. Does not bruise/bleed easily.   Psychiatric/Behavioral:  Negative for dysphoric mood and sleep disturbance. The patient is not nervous/anxious.      Objective:      Physical Exam  Vitals and nursing note reviewed.   Constitutional:       General: She is not in acute distress.     Appearance: Normal appearance. She is well-developed. She is obese.      Comments: Ambulatory without assistive device   HENT:      Head: Normocephalic and atraumatic.      Right Ear: Tympanic membrane, ear canal and external ear normal.      Left Ear: Tympanic membrane, ear canal and external ear normal.      Nose: Nose normal. No mucosal edema or rhinorrhea.      Mouth/Throat:      Lips: Pink.      Mouth: Mucous membranes are moist.      Pharynx: Oropharynx is clear. Uvula midline.   Eyes:      General: Lids are normal. No scleral icterus.     Extraocular Movements: Extraocular movements intact.      Conjunctiva/sclera: Conjunctivae normal.      Pupils: Pupils are equal,  round, and reactive to light.   Neck:      Thyroid: No thyromegaly.   Cardiovascular:      Rate and Rhythm: Normal rate and regular rhythm.      Pulses: Normal pulses.   Pulmonary:      Effort: Pulmonary effort is normal.      Breath sounds: Normal breath sounds. No wheezing, rhonchi or rales.   Abdominal:      General: Bowel sounds are normal. There is no distension.      Palpations: Abdomen is soft. There is no mass.      Tenderness: There is no abdominal tenderness.   Musculoskeletal:      Cervical back: Normal range of motion and neck supple.      Right lower leg: No edema.      Left lower leg: No edema.   Lymphadenopathy:      Cervical: No cervical adenopathy.   Skin:     General: Skin is warm and dry.      Findings: No lesion or rash.      Nails: There is no clubbing.   Neurological:      Mental Status: She is alert.      Cranial Nerves: No cranial nerve deficit.      Sensory: No sensory deficit.   Psychiatric:         Mood and Affect: Mood and affect normal.       Assessment:       1. Routine general medical examination at a health care facility    2. Hemiparesis affecting right side as late effect of cerebrovascular accident (CVA)    3. Class 3 severe obesity due to excess calories with serious comorbidity and body mass index (BMI) of 40.0 to 44.9 in adult    4. Calcification of aorta    5. Essential hypertension    6. Hyperlipidemia, unspecified hyperlipidemia type    7. Osteopenia of multiple sites    8. Encounter for screening mammogram for breast cancer    9. Chronic rhinitis    10. Abnormal finding of blood chemistry, unspecified          Plan:   1. Routine general medical examination at a health care facility    2. Hemiparesis affecting right side as late effect of cerebrovascular accident (CVA)  -     clopidogreL (PLAVIX) 75 mg tablet; Take 1 tablet (75 mg total) by mouth once daily.  Dispense: 90 tablet; Refill: 3    3. Class 3 severe obesity due to excess calories with serious comorbidity and body  mass index (BMI) of 40.0 to 44.9 in adult  Assessment & Plan:  Wt Readings from Last 10 Encounters:   01/18/23 108 kg (238 lb 3.3 oz)   01/03/23 109.2 kg (240 lb 11.9 oz)   08/31/22 106.1 kg (234 lb)   07/18/22 106.4 kg (234 lb 9.1 oz)   03/22/22 103 kg (227 lb)   02/10/22 103 kg (227 lb)   02/02/22 103.4 kg (227 lb 13.5 oz)   01/18/22 104.6 kg (230 lb 9.6 oz)   07/15/21 99.7 kg (219 lb 12.8 oz)   03/19/21 98 kg (216 lb)     Body mass index is 43.57 kg/m².  Counseled on diet and exercise.    Orders:  -     Hemoglobin A1C; Future; Expected date: 01/18/2023    4. Calcification of aorta  Overview:  CXR 6/2018, on plavix and statin      5. Essential hypertension  Assessment & Plan:  /88, controlled, continue amlodipine, losartan    Orders:  -     TSH; Future; Expected date: 01/18/2023  -     CBC Auto Differential; Future; Expected date: 01/18/2023  -     amLODIPine (NORVASC) 10 MG tablet; Take 1 tablet (10 mg total) by mouth once daily.  Dispense: 90 tablet; Refill: 3  -     losartan (COZAAR) 100 MG tablet; Take 1 tablet (100 mg total) by mouth once daily.  Dispense: 90 tablet; Refill: 3    6. Hyperlipidemia, unspecified hyperlipidemia type  Assessment & Plan:  Status pending lab, continue atorvastatin    Orders:  -     Comprehensive Metabolic Panel; Future; Expected date: 01/18/2023  -     Lipid Panel; Future; Expected date: 01/18/2023  -     atorvastatin (LIPITOR) 40 MG tablet; Take 1 tablet (40 mg total) by mouth once daily.  Dispense: 90 tablet; Refill: 3    7. Osteopenia of multiple sites  Overview:  DEXA 8/2022    Assessment & Plan:  DEXA UTD      8. Encounter for screening mammogram for breast cancer  -     Mammo Digital Screening Bilat w/ Yusef; Future; Expected date: 02/28/2023    9. Chronic rhinitis  -     fluticasone propionate (FLONASE) 50 mcg/actuation nasal spray; 1 spray (50 mcg total) by Each Nostril route 2 (two) times a day.  Dispense: 40 mL; Refill: 3  -     azelastine (ASTELIN) 137 mcg (0.1 %)  nasal spray; 1 spray (137 mcg total) by Nasal route 2 (two) times daily.  Dispense: 30 mL; Refill: 3    10. Abnormal finding of blood chemistry, unspecified  -     Hemoglobin A1C; Future; Expected date: 01/18/2023        Fasting labs today  MMG scheduled next month  6 month f/u with Dr. Oakes scheduled  Discussed Covid booster, scheduling information given.   Health Maintenance reviewed/updated.

## 2023-01-18 NOTE — PATIENT INSTRUCTIONS
"The bivalent covid booster is now available. You can schedule an appointment for the vaccine through [facility] or ask  to assist you with scheduling an appointment for the vaccine.    The bivalent vaccines, known as the "Omicron" vaccines, target both the original strain of COVID-19 as well as the Omicron variant, which is now the predominant strain in the [place].    The Omicron booster is authorized for individuals 12 years and older and is given two months after last dose of primary or booster shot.   "

## 2023-02-02 ENCOUNTER — TELEPHONE (OUTPATIENT)
Dept: INTERNAL MEDICINE | Facility: CLINIC | Age: 73
End: 2023-02-02
Payer: MEDICARE

## 2023-02-02 NOTE — TELEPHONE ENCOUNTER
----- Message from Kya Metz sent at 2/2/2023 12:08 PM CST -----  Contact: Nir  Type:  Pharmacy Calling to Clarify an RX    Name of Caller: Nir  Pharmacy Name:   [facility] - [place], [place] - [address]  [address] [facility] [address]  Phone: [phone] Fax: [phone]  Prescription Name: Valsartan (DIOVAN) 320 MG tablet  What do they need to clarify?: Patient was prescribe Losartan from  a different Doctor and he needs to know which one the patient needs to have fill  Best Call Back Number: [phone]  Additional Information:

## 2023-02-02 NOTE — TELEPHONE ENCOUNTER
Spoke with [facility] pharmacy staff, informed losartan was discontinued and valsartan was filled on 01/23/23.   Also, patient notified.

## 2023-02-09 DIAGNOSIS — Z00.00 ENCOUNTER FOR MEDICARE ANNUAL WELLNESS EXAM: ICD-10-CM

## 2023-03-01 ENCOUNTER — HOSPITAL ENCOUNTER (OUTPATIENT)
Dept: RADIOLOGY | Facility: HOSPITAL | Age: 73
Discharge: HOME | End: 2023-03-01
Attending: PHYSICIAN ASSISTANT
Payer: MEDICARE

## 2023-03-01 DIAGNOSIS — Z12.31 ENCOUNTER FOR SCREENING MAMMOGRAM FOR BREAST CANCER: ICD-10-CM

## 2023-03-01 PROCEDURE — 77063 BREAST TOMOSYNTHESIS BI: CPT | Mod: 26,HCNC,, | Performed by: RADIOLOGY

## 2023-03-01 PROCEDURE — 77067 MAMMO DIGITAL SCREENING BILAT WITH TOMO: ICD-10-PCS | Mod: 26,HCNC,, | Performed by: RADIOLOGY

## 2023-03-01 PROCEDURE — 77067 SCR MAMMO BI INCL CAD: CPT | Mod: 26,HCNC,, | Performed by: RADIOLOGY

## 2023-03-01 PROCEDURE — 77063 MAMMO DIGITAL SCREENING BILAT WITH TOMO: ICD-10-PCS | Mod: 26,HCNC,, | Performed by: RADIOLOGY

## 2023-03-01 PROCEDURE — 77067 SCR MAMMO BI INCL CAD: CPT | Mod: TC,HCNC

## 2023-03-23 ENCOUNTER — HOSPITAL ENCOUNTER (OUTPATIENT)
Dept: RADIOLOGY | Facility: HOSPITAL | Age: 73
Discharge: HOME | End: 2023-03-23
Attending: PHYSICIAN ASSISTANT
Payer: MEDICARE

## 2023-03-23 DIAGNOSIS — R92.8 ABNORMAL MAMMOGRAM: ICD-10-CM

## 2023-03-23 PROCEDURE — 76642 ULTRASOUND BREAST LIMITED: CPT | Mod: 26,HCNC,RT, | Performed by: RADIOLOGY

## 2023-03-23 PROCEDURE — 76642 ULTRASOUND BREAST LIMITED: CPT | Mod: TC,HCNC,RT

## 2023-03-23 PROCEDURE — 77065 MAMMO DIGITAL DIAGNOSTIC RIGHT WITH TOMO: ICD-10-PCS | Mod: 26,HCNC,RT, | Performed by: RADIOLOGY

## 2023-03-23 PROCEDURE — 76642 US BREAST RIGHT LIMITED: ICD-10-PCS | Mod: 26,HCNC,RT, | Performed by: RADIOLOGY

## 2023-03-23 PROCEDURE — 77061 MAMMO DIGITAL DIAGNOSTIC RIGHT WITH TOMO: ICD-10-PCS | Mod: 26,HCNC,RT, | Performed by: RADIOLOGY

## 2023-03-23 PROCEDURE — 77065 DX MAMMO INCL CAD UNI: CPT | Mod: TC,HCNC,RT

## 2023-03-23 PROCEDURE — 77061 BREAST TOMOSYNTHESIS UNI: CPT | Mod: 26,HCNC,RT, | Performed by: RADIOLOGY

## 2023-03-23 PROCEDURE — 77065 DX MAMMO INCL CAD UNI: CPT | Mod: 26,HCNC,RT, | Performed by: RADIOLOGY

## 2023-03-24 ENCOUNTER — TELEPHONE (OUTPATIENT)
Dept: RADIOLOGY | Facility: HOSPITAL | Age: 73
End: 2023-03-24
Payer: MEDICARE

## 2023-03-24 DIAGNOSIS — R92.8 ABNORMAL MAMMOGRAM: Primary | ICD-10-CM

## 2023-03-24 NOTE — TELEPHONE ENCOUNTER
Radiologist is recommending an ultrasound guided biopsy of right breast, patient will need to stop Plavix 75mg, if so, when can patient stop Plavix before the biopsy and when should she restart Plavix after her biopsy?

## 2023-03-28 ENCOUNTER — TELEPHONE (OUTPATIENT)
Dept: RADIOLOGY | Facility: HOSPITAL | Age: 73
End: 2023-03-28
Payer: MEDICARE

## 2023-03-28 NOTE — TELEPHONE ENCOUNTER
Ultrasound guided biopsy scheduled for 4/10/23 at 8am, arrival time 7:30 am. Instructions given to hold Plavix for 5 days before biopsy per Dr Oakes.  Biopsy instructions given with understandings verbalized. Patient has my contact information.

## 2023-04-06 ENCOUNTER — TELEPHONE (OUTPATIENT)
Dept: INTERNAL MEDICINE | Facility: CLINIC | Age: 73
End: 2023-04-06
Payer: MEDICARE

## 2023-04-06 NOTE — TELEPHONE ENCOUNTER
----- Message from Chauncey Calloway sent at 4/6/2023 12:08 PM CDT -----  Contact: [phone]  Type:  Patient Returning Call    Who Called:Tracy   Who Left Message for Patient:nurse   Does the patient know what this is regarding?:yes   Would the patient rather a call back or a response via [facility]? Call back   Best Call Back Number:[phone]  Additional Information: n/a      Thanks KB

## 2023-04-06 NOTE — TELEPHONE ENCOUNTER
Called patient she states that she doesn't know who called but that someone called from [facility]. I advised her that it was not our office that it may have been [facility] calling to confirm her appointment on Monday. Patient verbalized understanding.

## 2023-04-06 NOTE — TELEPHONE ENCOUNTER
----- Message from Rick Bean sent at 4/6/2023 10:51 AM CDT -----  Contact: Self- [phone]  .Type:  Patient Returning Call    Who Called:Tracy   Who Left Message for Patient:nurse   Does the patient know what this is regarding?:unknown   Would the patient rather a call back or a response via [facility]? Call back   Best Call Back Number:[phone]  Additional Information:

## 2023-04-10 ENCOUNTER — HOSPITAL ENCOUNTER (OUTPATIENT)
Dept: RADIOLOGY | Facility: HOSPITAL | Age: 73
Discharge: HOME | End: 2023-04-10
Attending: FAMILY MEDICINE
Payer: MEDICARE

## 2023-04-10 DIAGNOSIS — R92.8 ABNORMAL MAMMOGRAM: ICD-10-CM

## 2023-04-10 PROCEDURE — 77061 BREAST TOMOSYNTHESIS UNI: CPT | Mod: TC,HCNC,RT

## 2023-04-10 PROCEDURE — 77065 MAMMO DIGITAL DIAGNOSTIC RIGHT WITH TOMO: ICD-10-PCS | Mod: 26,HCNC,RT, | Performed by: RADIOLOGY

## 2023-04-10 PROCEDURE — 77061 BREAST TOMOSYNTHESIS UNI: CPT | Mod: 26,HCNC,RT, | Performed by: RADIOLOGY

## 2023-04-10 PROCEDURE — 77065 DX MAMMO INCL CAD UNI: CPT | Mod: 26,HCNC,RT, | Performed by: RADIOLOGY

## 2023-04-10 PROCEDURE — 77061 MAMMO DIGITAL DIAGNOSTIC RIGHT WITH TOMO: ICD-10-PCS | Mod: 26,HCNC,RT, | Performed by: RADIOLOGY

## 2023-05-18 ENCOUNTER — TELEPHONE (OUTPATIENT)
Dept: ADMINISTRATIVE | Facility: HOSPITAL | Age: 73
End: 2023-05-18
Payer: MEDICARE

## 2023-06-09 DIAGNOSIS — J31.0 CHRONIC RHINITIS: ICD-10-CM

## 2023-06-09 RX ORDER — AZELASTINE 1 MG/ML
SPRAY, METERED NASAL
Qty: 60 ML | Refills: 0 | Status: SHIPPED | OUTPATIENT
Start: 2023-06-09 | End: 2023-09-18 | Stop reason: SDUPTHER

## 2023-07-19 ENCOUNTER — OFFICE VISIT (OUTPATIENT)
Dept: HOME HEALTH SERVICES | Facility: CLINIC | Age: 73
End: 2023-07-19
Payer: MEDICARE

## 2023-07-19 VITALS
OXYGEN SATURATION: 98 % | WEIGHT: 240 LBS | TEMPERATURE: 98 F | BODY MASS INDEX: 44.16 KG/M2 | HEART RATE: 91 BPM | HEIGHT: 62 IN | SYSTOLIC BLOOD PRESSURE: 149 MMHG | DIASTOLIC BLOOD PRESSURE: 89 MMHG

## 2023-07-19 DIAGNOSIS — Z00.00 ENCOUNTER FOR MEDICARE ANNUAL WELLNESS EXAM: ICD-10-CM

## 2023-07-19 DIAGNOSIS — M85.89 OSTEOPENIA OF MULTIPLE SITES: ICD-10-CM

## 2023-07-19 DIAGNOSIS — M25.562 CHRONIC PAIN OF LEFT KNEE: ICD-10-CM

## 2023-07-19 DIAGNOSIS — I70.0 CALCIFICATION OF AORTA: ICD-10-CM

## 2023-07-19 DIAGNOSIS — H91.93 DECREASED HEARING OF BOTH EARS: ICD-10-CM

## 2023-07-19 DIAGNOSIS — Z00.00 ENCOUNTER FOR PREVENTIVE HEALTH EXAMINATION: Primary | ICD-10-CM

## 2023-07-19 DIAGNOSIS — I69.351 HEMIPARESIS AFFECTING RIGHT SIDE AS LATE EFFECT OF CEREBROVASCULAR ACCIDENT (CVA): ICD-10-CM

## 2023-07-19 DIAGNOSIS — E66.01 CLASS 3 SEVERE OBESITY DUE TO EXCESS CALORIES WITH SERIOUS COMORBIDITY AND BODY MASS INDEX (BMI) OF 40.0 TO 44.9 IN ADULT: ICD-10-CM

## 2023-07-19 DIAGNOSIS — Z86.010 HISTORY OF COLON POLYPS: ICD-10-CM

## 2023-07-19 DIAGNOSIS — G89.29 CHRONIC PAIN OF LEFT KNEE: ICD-10-CM

## 2023-07-19 DIAGNOSIS — E78.5 HYPERLIPIDEMIA, UNSPECIFIED HYPERLIPIDEMIA TYPE: ICD-10-CM

## 2023-07-19 DIAGNOSIS — R73.03 PREDIABETES: ICD-10-CM

## 2023-07-19 DIAGNOSIS — R60.0 BILATERAL LOWER EXTREMITY EDEMA: ICD-10-CM

## 2023-07-19 DIAGNOSIS — I10 ESSENTIAL HYPERTENSION: ICD-10-CM

## 2023-07-19 PROCEDURE — G9919 PR SCREENING AND POSITIVE: ICD-10-PCS | Mod: CPTII,S$GLB,, | Performed by: NURSE PRACTITIONER

## 2023-07-19 PROCEDURE — G0439 PR MEDICARE ANNUAL WELLNESS SUBSEQUENT VISIT: ICD-10-PCS | Mod: S$GLB,,, | Performed by: NURSE PRACTITIONER

## 2023-07-19 PROCEDURE — 1159F MED LIST DOCD IN RCRD: CPT | Mod: CPTII,S$GLB,, | Performed by: NURSE PRACTITIONER

## 2023-07-19 PROCEDURE — 3079F PR MOST RECENT DIASTOLIC BLOOD PRESSURE 80-89 MM HG: ICD-10-PCS | Mod: CPTII,S$GLB,, | Performed by: NURSE PRACTITIONER

## 2023-07-19 PROCEDURE — 1126F AMNT PAIN NOTED NONE PRSNT: CPT | Mod: CPTII,S$GLB,, | Performed by: NURSE PRACTITIONER

## 2023-07-19 PROCEDURE — 1160F PR REVIEW ALL MEDS BY PRESCRIBER/CLIN PHARMACIST DOCUMENTED: ICD-10-PCS | Mod: CPTII,S$GLB,, | Performed by: NURSE PRACTITIONER

## 2023-07-19 PROCEDURE — 1160F RVW MEDS BY RX/DR IN RCRD: CPT | Mod: CPTII,S$GLB,, | Performed by: NURSE PRACTITIONER

## 2023-07-19 PROCEDURE — 4010F PR ACE/ARB THEARPY RXD/TAKEN: ICD-10-PCS | Mod: CPTII,S$GLB,, | Performed by: NURSE PRACTITIONER

## 2023-07-19 PROCEDURE — 3288F FALL RISK ASSESSMENT DOCD: CPT | Mod: CPTII,S$GLB,, | Performed by: NURSE PRACTITIONER

## 2023-07-19 PROCEDURE — 3008F PR BODY MASS INDEX (BMI) DOCUMENTED: ICD-10-PCS | Mod: CPTII,S$GLB,, | Performed by: NURSE PRACTITIONER

## 2023-07-19 PROCEDURE — 3044F HG A1C LEVEL LT 7.0%: CPT | Mod: CPTII,S$GLB,, | Performed by: NURSE PRACTITIONER

## 2023-07-19 PROCEDURE — 1101F PT FALLS ASSESS-DOCD LE1/YR: CPT | Mod: CPTII,S$GLB,, | Performed by: NURSE PRACTITIONER

## 2023-07-19 PROCEDURE — 3077F SYST BP >= 140 MM HG: CPT | Mod: CPTII,S$GLB,, | Performed by: NURSE PRACTITIONER

## 2023-07-19 PROCEDURE — 3288F PR FALLS RISK ASSESSMENT DOCUMENTED: ICD-10-PCS | Mod: CPTII,S$GLB,, | Performed by: NURSE PRACTITIONER

## 2023-07-19 PROCEDURE — 1159F PR MEDICATION LIST DOCUMENTED IN MEDICAL RECORD: ICD-10-PCS | Mod: CPTII,S$GLB,, | Performed by: NURSE PRACTITIONER

## 2023-07-19 PROCEDURE — 1101F PR PT FALLS ASSESS DOC 0-1 FALLS W/OUT INJ PAST YR: ICD-10-PCS | Mod: CPTII,S$GLB,, | Performed by: NURSE PRACTITIONER

## 2023-07-19 PROCEDURE — 1170F PR FUNCTIONAL STATUS ASSESSED: ICD-10-PCS | Mod: CPTII,S$GLB,, | Performed by: NURSE PRACTITIONER

## 2023-07-19 PROCEDURE — 3077F PR MOST RECENT SYSTOLIC BLOOD PRESSURE >= 140 MM HG: ICD-10-PCS | Mod: CPTII,S$GLB,, | Performed by: NURSE PRACTITIONER

## 2023-07-19 PROCEDURE — 1126F PR PAIN SEVERITY QUANTIFIED, NO PAIN PRESENT: ICD-10-PCS | Mod: CPTII,S$GLB,, | Performed by: NURSE PRACTITIONER

## 2023-07-19 PROCEDURE — 3079F DIAST BP 80-89 MM HG: CPT | Mod: CPTII,S$GLB,, | Performed by: NURSE PRACTITIONER

## 2023-07-19 PROCEDURE — G0439 PPPS, SUBSEQ VISIT: HCPCS | Mod: S$GLB,,, | Performed by: NURSE PRACTITIONER

## 2023-07-19 PROCEDURE — 3044F PR MOST RECENT HEMOGLOBIN A1C LEVEL <7.0%: ICD-10-PCS | Mod: CPTII,S$GLB,, | Performed by: NURSE PRACTITIONER

## 2023-07-19 PROCEDURE — G9919 SCRN ND POS ND PROV OF REC: HCPCS | Mod: CPTII,S$GLB,, | Performed by: NURSE PRACTITIONER

## 2023-07-19 PROCEDURE — 1170F FXNL STATUS ASSESSED: CPT | Mod: CPTII,S$GLB,, | Performed by: NURSE PRACTITIONER

## 2023-07-19 PROCEDURE — 4010F ACE/ARB THERAPY RXD/TAKEN: CPT | Mod: CPTII,S$GLB,, | Performed by: NURSE PRACTITIONER

## 2023-07-19 PROCEDURE — 3008F BODY MASS INDEX DOCD: CPT | Mod: CPTII,S$GLB,, | Performed by: NURSE PRACTITIONER

## 2023-07-19 NOTE — PATIENT INSTRUCTIONS
Counseling and Referral of Other Preventative  (Italic type indicates deductible and co-insurance are waived)    Patient Name: Tracy Najera  Today's Date: 7/19/2023    Health Maintenance       Date Due Completion Date    COVID-19 Vaccine (5 - Pfizer series) 09/12/2022 7/18/2022    Influenza Vaccine (1) 09/01/2023 9/19/2022    High Dose Statin 01/18/2024 1/18/2023    Lipid Panel 01/18/2024 1/18/2023    Mammogram 04/10/2024 4/10/2023    Override on 8/3/2015: Done (Done at [facility])    Override on 10/6/2014: Done (Done at [facility]--exact date unknown)    Override on 9/11/2013: (N/S)    Override on 8/19/2012: (N/S)    Colorectal Cancer Screening 04/16/2024 4/16/2019    DEXA Scan 08/16/2025 8/16/2022    Override on 11/2/2015: Done (Exact date unknown--done at [facility])    TETANUS VACCINE 10/29/2025 10/29/2015        No orders of the defined types were placed in this encounter.    The following information is provided to all patients.  This information is to help you find resources for any of the problems found today that may be affecting your health:                Living healthy guide: www.[facility].[place].gov      Understanding Diabetes: www.diabetes.org      Eating healthy: www.cdc.gov/healthyweight      CDC home safety checklist: www.cdc.gov/steadi/patient.html      Agency on Aging: www.goea.[place].gov      Alcoholics anonymous (AA): www.aa.org      Physical Activity: www.elder.nih.gov/zd4cfsx      Tobacco use: www.quitwithusla.org

## 2023-07-19 NOTE — PROGRESS NOTES
"Tracy Najera presented for Medicare AW today. The following components were reviewed and updated:    Medical history  Family History  Social history  Allergies and Current Medications  Health Risk Assessment  Health Maintenance  Care Team    **See Completed Assessments for Annual Wellness visit with in the encounter summary    The following assessments were completed:  Depression Screening  Cognitive function Screening      Timed Get Up Test  Whisper Test    Vitals:    07/19/23 0917   BP: (!) 149/89   Pulse: 91   Temp: 98.1 °F (36.7 °C)   TempSrc: Temporal   SpO2: 98%   Weight: 108.9 kg (240 lb)   Height: 5' 2" (1.575 m)     Body mass index is 43.9 kg/m².   ]    Physical Exam  Vitals reviewed.   Constitutional:       Appearance: Normal appearance. She is obese.   HENT:      Head: Normocephalic and atraumatic.   Cardiovascular:      Rate and Rhythm: Normal rate and regular rhythm.      Pulses: Normal pulses.      Heart sounds: Normal heart sounds.   Pulmonary:      Effort: Pulmonary effort is normal.      Breath sounds: Normal breath sounds.   Musculoskeletal:         General: Normal range of motion.      Cervical back: Normal range of motion and neck supple.   Skin:     General: Skin is warm and dry.   Neurological:      Mental Status: She is alert and oriented to person, place, and time.      Motor: Weakness (slightly weaker to the right arm and leg) present.      Gait: Gait abnormal.   Psychiatric:         Mood and Affect: Mood normal.         Behavior: Behavior normal.        Outpatient Medications Marked as Taking for the 7/19/23 encounter (Office Visit) with EMMA Deutsch   Medication Sig Dispense Refill    amLODIPine (NORVASC) 10 MG tablet Take 1 tablet (10 mg total) by mouth once daily. 90 tablet 3    atorvastatin (LIPITOR) 40 MG tablet Take 1 tablet (40 mg total) by mouth once daily. 90 tablet 3    azelastine (ASTELIN) 137 mcg (0.1 %) nasal spray USE 1 SPRAY NASALLY 2 (TWO) TIMES DAILY. 60 mL 0    " cholecalciferol, vitamin D3, (VITAMIN D3) 50 mcg (2,000 unit) Cap capsule Take 2,000 Units by mouth once daily.      clopidogreL (PLAVIX) 75 mg tablet Take 1 tablet (75 mg total) by mouth once daily. 90 tablet 3    fluticasone propionate (FLONASE) 50 mcg/actuation nasal spray 1 spray (50 mcg total) by Each Nostril route 2 (two) times a day. 40 mL 3    guaifenesin/dextromethorphan (CORICIDIN HBP CHEST TEAGAN-COUGH ORAL) Take 1 tablet by mouth daily as needed (cough).      losartan (COZAAR) 100 MG tablet Take 1 tablet (100 mg total) by mouth once daily. 90 tablet 1        Diagnoses and health risks identified today and associated recommendations/orders:  1. Encounter for preventive health examination 2. Encounter for Medicare annual wellness exam  Medicare awv complete. Health maintenance:  COVID-19 5th vaccine due-encouraged patient to obtain.  - Ambulatory Referral/Consult to Enhanced Annual Wellness Visit (eAWV)    3. Calcification of aorta  Chronic and stable on Plavix and statin medication. Continue current. F/u with pcp.      4. Hemiparesis affecting right side as late effect of cerebrovascular accident (CVA)  Chronic and stable. Continue current management.  On Lipitor and Plavix.  See med list above.  Fall precautions recommended.  Follow up with PCP.     5. Class 3 severe obesity due to excess calories with serious comorbidity and body mass index (BMI) of 40.0 to 44.9 in adult  Chronic. Recommend diet and exercise to lose weight. Follow up with your PCP as planned to discuss adjustments to your treatment plan.      6. Essential hypertension  Chronic and stable on BP medication.  Continue current management.  See med list above. Recommend low sodium diet. Follow up with PCP.       7. Hyperlipidemia, unspecified hyperlipidemia type  Lab Results   Component Value Date    CHOL 177 01/18/2023    CHOL 158 02/28/2022    CHOL 158 02/28/2022     Lab Results   Component Value Date    HDL 48 01/18/2023    HDL 48  02/28/2022    HDL 48 02/28/2022     Lab Results   Component Value Date    LDLCALC 109.0 01/18/2023    LDLCALC 94.2 02/28/2022    LDLCALC 94.2 02/28/2022     No results found for: DLDL  Lab Results   Component Value Date    TRIG 100 01/18/2023    TRIG 79 02/28/2022    TRIG 79 02/28/2022       f1   Lab Results   Component Value Date    CHOLHDL 27.1 01/18/2023    CHOLHDL 30.4 02/28/2022    CHOLHDL 30.4 02/28/2022    Chronic and stable on statin medication. Continue current. F/u with pcp.      8. Prediabetes   Latest Reference Range & Units 05/08/18 07:26 06/24/20 09:16 02/28/22 08:50 01/18/23 10:11   Hemoglobin A1C External 4.0 - 5.6 % 5.4 5.2 5.5 5.7 (H)   Estimated Avg Glucose 68 - 131 mg/dL 108 103 111 117   (H): Data is abnormally high  Stable. Onset in jan 2023.  Recommended patient follow a diabetic diet and continue regular exercise.  Not on any medications.  Follow up with PCP.    9. Decreased hearing of both ears  Patient declined audiology referral.    10. Osteopenia of multiple sites  Chronic and stable on current management. Continue vitamin d, calcium in the diet, and weight bearing exercise. Avoid heavy etoh use and smoking. See med list above. Follow up with PCP.       11. Chronic pain of left knee  Stable.  Patient states this improved after physical therapy.  Recommended to continue exercises taught by physical therapy.  Follow up with PCP.     12. Bilateral lower extremity edema  Chronic and stable. Continue current management. See med list above. Follow up with PCP.     13. History of colon polyps  Next colonoscopy due April 2024.        Provided Tracy with a 5-10 year written screening schedule and personal prevention plan. Recommendations were developed using the USPSTF age appropriate recommendations. Education, counseling, and referrals were provided as needed.  After Visit Summary printed and given to patient which includes a list of additional screenings\tests needed.    Follow up in about 1  year (around 7/19/2024) for annual wellness visit.      Eileen Adame, P    I offered to discuss advanced care planning, including how to pick a person who would make decisions for you if you were unable to make them for yourself, called a health care power of , and what kind of decisions you might make such as use of life sustaining treatments such as ventilators and tube feeding when faced with a life limiting illness recorded on a living will that they will need to know. (How you want to be cared for as you near the end of your natural life)     X Patient is interested in learning more about how to make advanced directives.  I provided them paperwork and offered to discuss this with them.

## 2023-07-20 ENCOUNTER — PATIENT MESSAGE (OUTPATIENT)
Dept: INTERNAL MEDICINE | Facility: CLINIC | Age: 73
End: 2023-07-20
Payer: MEDICARE

## 2023-07-25 ENCOUNTER — LAB VISIT (OUTPATIENT)
Dept: LAB | Facility: HOSPITAL | Age: 73
End: 2023-07-25
Attending: FAMILY MEDICINE
Payer: MEDICARE

## 2023-07-25 ENCOUNTER — OFFICE VISIT (OUTPATIENT)
Dept: INTERNAL MEDICINE | Facility: CLINIC | Age: 73
End: 2023-07-25
Payer: MEDICARE

## 2023-07-25 VITALS
BODY MASS INDEX: 43.57 KG/M2 | SYSTOLIC BLOOD PRESSURE: 122 MMHG | TEMPERATURE: 96 F | OXYGEN SATURATION: 95 % | DIASTOLIC BLOOD PRESSURE: 78 MMHG | WEIGHT: 236.75 LBS | HEIGHT: 62 IN | HEART RATE: 107 BPM

## 2023-07-25 DIAGNOSIS — Z00.00 ANNUAL PHYSICAL EXAM: ICD-10-CM

## 2023-07-25 DIAGNOSIS — I10 ESSENTIAL HYPERTENSION: ICD-10-CM

## 2023-07-25 DIAGNOSIS — I69.30 HISTORY OF CVA WITH RESIDUAL DEFICIT: ICD-10-CM

## 2023-07-25 DIAGNOSIS — Z00.00 ANNUAL PHYSICAL EXAM: Primary | ICD-10-CM

## 2023-07-25 DIAGNOSIS — R73.03 PREDIABETES: ICD-10-CM

## 2023-07-25 DIAGNOSIS — E78.5 HYPERLIPIDEMIA, UNSPECIFIED HYPERLIPIDEMIA TYPE: ICD-10-CM

## 2023-07-25 LAB
ALBUMIN SERPL BCP-MCNC: 3.9 G/DL (ref 3.5–5.2)
ALP SERPL-CCNC: 103 U/L (ref 55–135)
ALT SERPL W/O P-5'-P-CCNC: 23 U/L (ref 10–44)
ANION GAP SERPL CALC-SCNC: 10 MMOL/L (ref 8–16)
AST SERPL-CCNC: 25 U/L (ref 10–40)
BASOPHILS # BLD AUTO: 0.03 K/UL (ref 0–0.2)
BASOPHILS NFR BLD: 0.3 % (ref 0–1.9)
BILIRUB SERPL-MCNC: 0.3 MG/DL (ref 0.1–1)
BUN SERPL-MCNC: 14 MG/DL (ref 8–23)
CALCIUM SERPL-MCNC: 9.9 MG/DL (ref 8.7–10.5)
CHLORIDE SERPL-SCNC: 107 MMOL/L (ref 95–110)
CO2 SERPL-SCNC: 24 MMOL/L (ref 23–29)
CREAT SERPL-MCNC: 1 MG/DL (ref 0.5–1.4)
DIFFERENTIAL METHOD: ABNORMAL
EOSINOPHIL # BLD AUTO: 0.1 K/UL (ref 0–0.5)
EOSINOPHIL NFR BLD: 1.1 % (ref 0–8)
ERYTHROCYTE [DISTWIDTH] IN BLOOD BY AUTOMATED COUNT: 14.2 % (ref 11.5–14.5)
EST. GFR  (NO RACE VARIABLE): 59.9 ML/MIN/1.73 M^2
ESTIMATED AVG GLUCOSE: 117 MG/DL (ref 68–131)
GLUCOSE SERPL-MCNC: 100 MG/DL (ref 70–110)
HBA1C MFR BLD: 5.7 % (ref 4–5.6)
HCT VFR BLD AUTO: 39.5 % (ref 37–48.5)
HGB BLD-MCNC: 12.3 G/DL (ref 12–16)
IMM GRANULOCYTES # BLD AUTO: 0.02 K/UL (ref 0–0.04)
IMM GRANULOCYTES NFR BLD AUTO: 0.2 % (ref 0–0.5)
LYMPHOCYTES # BLD AUTO: 2.1 K/UL (ref 1–4.8)
LYMPHOCYTES NFR BLD: 22.4 % (ref 18–48)
MCH RBC QN AUTO: 28.1 PG (ref 27–31)
MCHC RBC AUTO-ENTMCNC: 31.1 G/DL (ref 32–36)
MCV RBC AUTO: 90 FL (ref 82–98)
MONOCYTES # BLD AUTO: 0.9 K/UL (ref 0.3–1)
MONOCYTES NFR BLD: 9.7 % (ref 4–15)
NEUTROPHILS # BLD AUTO: 6.2 K/UL (ref 1.8–7.7)
NEUTROPHILS NFR BLD: 66.3 % (ref 38–73)
NRBC BLD-RTO: 0 /100 WBC
PLATELET # BLD AUTO: 326 K/UL (ref 150–450)
PMV BLD AUTO: 11.7 FL (ref 9.2–12.9)
POTASSIUM SERPL-SCNC: 4.4 MMOL/L (ref 3.5–5.1)
PROT SERPL-MCNC: 7.9 G/DL (ref 6–8.4)
RBC # BLD AUTO: 4.38 M/UL (ref 4–5.4)
SODIUM SERPL-SCNC: 141 MMOL/L (ref 136–145)
WBC # BLD AUTO: 9.3 K/UL (ref 3.9–12.7)

## 2023-07-25 PROCEDURE — 1101F PT FALLS ASSESS-DOCD LE1/YR: CPT | Mod: HCNC,CPTII,S$GLB, | Performed by: FAMILY MEDICINE

## 2023-07-25 PROCEDURE — 1159F PR MEDICATION LIST DOCUMENTED IN MEDICAL RECORD: ICD-10-PCS | Mod: HCNC,CPTII,S$GLB, | Performed by: FAMILY MEDICINE

## 2023-07-25 PROCEDURE — 1126F PR PAIN SEVERITY QUANTIFIED, NO PAIN PRESENT: ICD-10-PCS | Mod: HCNC,CPTII,S$GLB, | Performed by: FAMILY MEDICINE

## 2023-07-25 PROCEDURE — 3044F PR MOST RECENT HEMOGLOBIN A1C LEVEL <7.0%: ICD-10-PCS | Mod: HCNC,CPTII,S$GLB, | Performed by: FAMILY MEDICINE

## 2023-07-25 PROCEDURE — 99999 PR PBB SHADOW E&M-EST. PATIENT-LVL III: CPT | Mod: PBBFAC,HCNC,, | Performed by: FAMILY MEDICINE

## 2023-07-25 PROCEDURE — 99999 PR PBB SHADOW E&M-EST. PATIENT-LVL III: ICD-10-PCS | Mod: PBBFAC,HCNC,, | Performed by: FAMILY MEDICINE

## 2023-07-25 PROCEDURE — 3288F PR FALLS RISK ASSESSMENT DOCUMENTED: ICD-10-PCS | Mod: HCNC,CPTII,S$GLB, | Performed by: FAMILY MEDICINE

## 2023-07-25 PROCEDURE — 3044F HG A1C LEVEL LT 7.0%: CPT | Mod: HCNC,CPTII,S$GLB, | Performed by: FAMILY MEDICINE

## 2023-07-25 PROCEDURE — 3288F FALL RISK ASSESSMENT DOCD: CPT | Mod: HCNC,CPTII,S$GLB, | Performed by: FAMILY MEDICINE

## 2023-07-25 PROCEDURE — 36415 COLL VENOUS BLD VENIPUNCTURE: CPT | Mod: HCNC | Performed by: FAMILY MEDICINE

## 2023-07-25 PROCEDURE — 83036 HEMOGLOBIN GLYCOSYLATED A1C: CPT | Mod: HCNC | Performed by: FAMILY MEDICINE

## 2023-07-25 PROCEDURE — 1159F MED LIST DOCD IN RCRD: CPT | Mod: HCNC,CPTII,S$GLB, | Performed by: FAMILY MEDICINE

## 2023-07-25 PROCEDURE — 80053 COMPREHEN METABOLIC PANEL: CPT | Mod: HCNC | Performed by: FAMILY MEDICINE

## 2023-07-25 PROCEDURE — 3078F PR MOST RECENT DIASTOLIC BLOOD PRESSURE < 80 MM HG: ICD-10-PCS | Mod: HCNC,CPTII,S$GLB, | Performed by: FAMILY MEDICINE

## 2023-07-25 PROCEDURE — 3078F DIAST BP <80 MM HG: CPT | Mod: HCNC,CPTII,S$GLB, | Performed by: FAMILY MEDICINE

## 2023-07-25 PROCEDURE — 3008F BODY MASS INDEX DOCD: CPT | Mod: HCNC,CPTII,S$GLB, | Performed by: FAMILY MEDICINE

## 2023-07-25 PROCEDURE — 1126F AMNT PAIN NOTED NONE PRSNT: CPT | Mod: HCNC,CPTII,S$GLB, | Performed by: FAMILY MEDICINE

## 2023-07-25 PROCEDURE — 85025 COMPLETE CBC W/AUTO DIFF WBC: CPT | Mod: HCNC | Performed by: FAMILY MEDICINE

## 2023-07-25 PROCEDURE — 3008F PR BODY MASS INDEX (BMI) DOCUMENTED: ICD-10-PCS | Mod: HCNC,CPTII,S$GLB, | Performed by: FAMILY MEDICINE

## 2023-07-25 PROCEDURE — 4010F ACE/ARB THERAPY RXD/TAKEN: CPT | Mod: HCNC,CPTII,S$GLB, | Performed by: FAMILY MEDICINE

## 2023-07-25 PROCEDURE — 4010F PR ACE/ARB THEARPY RXD/TAKEN: ICD-10-PCS | Mod: HCNC,CPTII,S$GLB, | Performed by: FAMILY MEDICINE

## 2023-07-25 PROCEDURE — 3074F PR MOST RECENT SYSTOLIC BLOOD PRESSURE < 130 MM HG: ICD-10-PCS | Mod: HCNC,CPTII,S$GLB, | Performed by: FAMILY MEDICINE

## 2023-07-25 PROCEDURE — 3074F SYST BP LT 130 MM HG: CPT | Mod: HCNC,CPTII,S$GLB, | Performed by: FAMILY MEDICINE

## 2023-07-25 PROCEDURE — 99397 PER PM REEVAL EST PAT 65+ YR: CPT | Mod: HCNC,S$GLB,, | Performed by: FAMILY MEDICINE

## 2023-07-25 PROCEDURE — 99397 PR PREVENTIVE VISIT,EST,65 & OVER: ICD-10-PCS | Mod: HCNC,S$GLB,, | Performed by: FAMILY MEDICINE

## 2023-07-25 PROCEDURE — 1101F PR PT FALLS ASSESS DOC 0-1 FALLS W/OUT INJ PAST YR: ICD-10-PCS | Mod: HCNC,CPTII,S$GLB, | Performed by: FAMILY MEDICINE

## 2023-07-25 RX ORDER — VALSARTAN 320 MG/1
TABLET ORAL
COMMUNITY
Start: 2023-04-02 | End: 2023-08-01 | Stop reason: SDUPTHER

## 2023-07-25 NOTE — PROGRESS NOTES
Subjective:       Patient ID: Tracy Najera is a 72 y.o. female.    Chief Complaint: Annual Exam    HPI    Patient Active Problem List   Diagnosis    History of CVA with residual deficit    Hyperlipidemia    Essential hypertension    Calcification of aorta    Osteopenia of multiple sites    Class 3 severe obesity due to excess calories with serious comorbidity and body mass index (BMI) of 40.0 to 44.9 in adult    History of colon polyps    Bilateral lower extremity edema    Hemiparesis affecting right side as late effect of cerebrovascular accident (CVA)    Chronic pain of left knee    Decreased hearing of both ears    Prediabetes       Past Medical History:   Diagnosis Date    Anticoagulant long-term use     Plavix    Arthritis     Bloody discharge from left nipple 6/1/2017    Cerebral artery occlusion with cerebral infarction 9/5/2013 2:35:44 PM    [facility] Historical - Cardiovascular: CVA (Cerebrovascular accident)-No Additional Notes    Complications affecting other specified body systems, hypertension 9/5/2013 2:32:20 PM    [facility] Historical - Cardiovascular: Hypertension-No Additional Notes    CVA (cerebral vascular accident) 2008    R hemiparesis, R hand contracture    HTN (hypertension)     Hyperlipidemia     Other and unspecified hyperlipidemia 9/5/2013 2:32:23 PM    [facility] Historical - Endocrine/Metabolic/Immune: Hyperlipidemia-No Additional Notes    Severe obesity (BMI 35.0-39.9) with comorbidity     Special screening for malignant neoplasms, colon 4/16/2019       Past Surgical History:   Procedure Laterality Date    BREAST BIOPSY Left 2017    COLONOSCOPY N/A 04/16/2019    Procedure: COLONOSCOPY;  Surgeon: Ulisses Jacobsen MD;  Location: [facility];  Service: Endoscopy;  Laterality: N/A;    HYSTERECTOMY      partial     TONSILLECTOMY         Family History   Problem Relation Age of Onset    Hypertension Mother     Hypertension Sister     Cancer Neg Hx     Diabetes Neg Hx     Heart  disease Neg Hx     Kidney disease Neg Hx     Hyperlipidemia Neg Hx        Social History     Tobacco Use   Smoking Status Never   Smokeless Tobacco Never       Wt Readings from Last 5 Encounters:   07/25/23 107.4 kg (236 lb 12.4 oz)   07/19/23 108.9 kg (240 lb)   01/18/23 108 kg (238 lb 3.3 oz)   01/03/23 109.2 kg (240 lb 11.9 oz)   08/31/22 106.1 kg (234 lb)       For further HPI details, see assessment and plan.    Review of Systems   Constitutional:  Negative for chills and fever.   Respiratory:  Negative for shortness of breath and wheezing.    Cardiovascular:  Negative for chest pain.   Neurological:  Negative for dizziness.     Objective:      Vitals:    07/25/23 0716   BP: 122/78   Pulse: 107   Temp: 96 °F (35.6 °C)       Physical Exam  Constitutional:       General: She is not in acute distress.     Appearance: Normal appearance. She is not ill-appearing or diaphoretic.   Cardiovascular:      Rate and Rhythm: Normal rate and regular rhythm.   Pulmonary:      Effort: Pulmonary effort is normal. No respiratory distress.   Neurological:      General: No focal deficit present.      Mental Status: She is alert.   Psychiatric:         Mood and Affect: Mood normal.         Behavior: Behavior normal.       Assessment:       1. Annual physical exam    2. Hyperlipidemia, unspecified hyperlipidemia type    3. Essential hypertension    4. History of CVA with residual deficit    5. Prediabetes        Plan:         Patient presents today for an annual exam     She is without complaint   She does not smoke   She does not drink   She does no drugs   She does wear her seat belt     Discussed exercising.  She estimates she is going to the gym  roughly 5 times a week.  Encouraged this behavior.  Discussed the CDC physical recommendations which are 150 minutes a week of moderate intensity aerobic activity and 2 days a week of muscle strengthening activity     Obesity is an ongoing struggle.  She is cut out cold drinks and  calories in beverage form.  She is trying to eat smaller portions.  Placed my emphasis on caloric restriction/reduction. Doesn't desire to meet w/ lifestyle clinic.      Can consider GLP pending upcoming labs    Prediabetes  We will check her A1c    Noted a mildly low hemoglobin   We will update her complete blood count     Given she is on long-term medication will also check a metabolic panel   Too soon to check her lipid panel at this point in time.  Continue her statin.      History of stroke  On statin, Plavix, pressure control indicated    Hypertension her blood pressure looks well controlled.  He is working with the digital hypertension program and using amlodipine with valsartan.  She had losartan listed on her chart.  I have removed that medication today.     Patient uses Flonase and Astelin to address allergic rhinitis.  They seem to work sufficiently well   She supplements with a vitamin-D supplement.  I am okay with its use     6 mo or prn    This note was verbally dictated, please excuse any type errors.

## 2023-08-01 RX ORDER — VALSARTAN 320 MG/1
320 TABLET ORAL DAILY
Qty: 90 TABLET | Refills: 0 | Status: SHIPPED | OUTPATIENT
Start: 2023-08-01 | End: 2023-10-10

## 2023-08-01 NOTE — TELEPHONE ENCOUNTER
----- Message from Vijaya Chapman sent at 8/1/2023 10:52 AM CDT -----  Contact: patient  Type:  RX Refill Request    Who Called: Tracy Najera   Refill or New Rx: refill   RX Name and Strength: valsartan (DIOVAN) 320 MG   How is the patient currently taking it? (ex. 1XDay): 1X daily   Is this a 30 day or 90 day RX: 90 day   Preferred Pharmacy with phone number:  [facility] - [place], [place] - [address]  [address] [address]  Phone: [phone] Fax: [phone]  Local or Mail Order:Mail  Ordering Provider:Violette   Would the patient rather a call back or a response via [facility]? Call back   Best Call Back Number:[phone]  Additional Information: pt would also like to check on the status of the pre diabetes medication she was supposed to be prescribed.

## 2023-08-01 NOTE — TELEPHONE ENCOUNTER
No care due was identified.  [facility] Embedded Care Due Messages. Reference number: [id number].   8/01/2023 11:33:46 AM CDT

## 2023-08-28 DIAGNOSIS — J31.0 CHRONIC RHINITIS: ICD-10-CM

## 2023-08-29 RX ORDER — AZELASTINE 1 MG/ML
SPRAY, METERED NASAL
Qty: 60 ML | Refills: 0 | OUTPATIENT
Start: 2023-08-29

## 2023-08-29 NOTE — TELEPHONE ENCOUNTER
Refill Decision Note   Tracy Najera  is requesting a refill authorization.  Brief Assessment and Rationale for Refill:  Quick Discontinue     Medication Therapy Plan: Pharmacy is requesting new scripts for the following medications without required information, (sig/ frequency/qty/etc)     Medication Reconciliation Completed: No   Comments:     No Care Gaps recommended.     Note composed:8:35 AM 08/29/2023

## 2023-09-18 DIAGNOSIS — J31.0 CHRONIC RHINITIS: ICD-10-CM

## 2023-09-18 RX ORDER — AZELASTINE 1 MG/ML
SPRAY, METERED NASAL
Qty: 30 ML | Refills: 3 | Status: SHIPPED | OUTPATIENT
Start: 2023-09-18

## 2023-09-18 NOTE — TELEPHONE ENCOUNTER
Refill Decision Note   Tracy Najera  is requesting a refill authorization.  Brief Assessment and Rationale for Refill:  Approve     Medication Therapy Plan:         Comments:     Note composed:10:26 AM 09/18/2023             Appointments     Last Visit   7/25/2023 Mike Oakes MD   Next Visit   1/25/2024 Mike Oakes MD

## 2023-09-18 NOTE — TELEPHONE ENCOUNTER
No care due was identified.  [facility] Embedded Care Due Messages. Reference number: [id number].   9/18/2023 10:09:53 AM CDT

## 2023-10-09 DIAGNOSIS — I10 ESSENTIAL HYPERTENSION: ICD-10-CM

## 2023-10-09 DIAGNOSIS — I69.351 HEMIPARESIS AFFECTING RIGHT SIDE AS LATE EFFECT OF CEREBROVASCULAR ACCIDENT (CVA): ICD-10-CM

## 2023-10-09 RX ORDER — AMLODIPINE BESYLATE 10 MG/1
10 TABLET ORAL
Qty: 90 TABLET | Refills: 3 | Status: SHIPPED | OUTPATIENT
Start: 2023-10-09

## 2023-10-09 RX ORDER — CLOPIDOGREL BISULFATE 75 MG/1
75 TABLET ORAL
Qty: 90 TABLET | Refills: 3 | Status: SHIPPED | OUTPATIENT
Start: 2023-10-09

## 2023-10-09 NOTE — TELEPHONE ENCOUNTER
No care due was identified.  [facility] Embedded Care Due Messages. Reference number: [id number].   10/09/2023 5:04:18 PM CDT

## 2023-10-09 NOTE — TELEPHONE ENCOUNTER
No care due was identified.  [facility] Embedded Care Due Messages. Reference number: [id number].   10/09/2023 5:03:59 PM CDT

## 2023-10-10 RX ORDER — VALSARTAN 320 MG/1
320 TABLET ORAL
Qty: 90 TABLET | Refills: 10 | Status: SHIPPED | OUTPATIENT
Start: 2023-10-10

## 2023-10-10 NOTE — TELEPHONE ENCOUNTER
Refill Routing Note   Medication(s) are not appropriate for processing by [facility] for the following reason(s):      No active prescription written by provider    [facility] action(s):  Defer Care Due:  None identified            Appointments  past 12m or future 3m with PCP    Date Provider   Last Visit   7/25/2023 Mike Oakes MD   Next Visit   10/9/2023 Mike Oakes MD   ED visits in past 90 days: 0        Note composed:8:57 PM 10/09/2023

## 2023-10-10 NOTE — TELEPHONE ENCOUNTER
Refill Decision Note   Tracy Najera  is requesting a refill authorization.  Brief Assessment and Rationale for Refill:  Approve     Medication Therapy Plan:         Comments:     Note composed:8:58 PM 10/09/2023

## 2023-10-18 ENCOUNTER — HOSPITAL ENCOUNTER (EMERGENCY)
Facility: HOSPITAL | Age: 73
Discharge: HOME | End: 2023-10-18
Attending: EMERGENCY MEDICINE
Payer: MEDICARE

## 2023-10-18 VITALS
OXYGEN SATURATION: 96 % | HEART RATE: 85 BPM | DIASTOLIC BLOOD PRESSURE: 97 MMHG | RESPIRATION RATE: 22 BRPM | BODY MASS INDEX: 43.61 KG/M2 | SYSTOLIC BLOOD PRESSURE: 179 MMHG | TEMPERATURE: 98 F | WEIGHT: 238.44 LBS

## 2023-10-18 DIAGNOSIS — R60.0 LEG EDEMA, RIGHT: ICD-10-CM

## 2023-10-18 DIAGNOSIS — S80.11XA HEMATOMA OF RIGHT LOWER LEG: ICD-10-CM

## 2023-10-18 DIAGNOSIS — L03.115 CELLULITIS OF RIGHT LEG: Primary | ICD-10-CM

## 2023-10-18 LAB
ALBUMIN SERPL BCP-MCNC: 4.2 G/DL (ref 3.5–5.2)
ALP SERPL-CCNC: 113 U/L (ref 55–135)
ALT SERPL W/O P-5'-P-CCNC: 55 U/L (ref 10–44)
ANION GAP SERPL CALC-SCNC: 12 MMOL/L (ref 8–16)
AST SERPL-CCNC: 53 U/L (ref 10–40)
BASOPHILS # BLD AUTO: 0.04 K/UL (ref 0–0.2)
BASOPHILS NFR BLD: 0.6 % (ref 0–1.9)
BILIRUB SERPL-MCNC: 0.3 MG/DL (ref 0.1–1)
BUN SERPL-MCNC: 13 MG/DL (ref 8–23)
CALCIUM SERPL-MCNC: 9.7 MG/DL (ref 8.7–10.5)
CHLORIDE SERPL-SCNC: 107 MMOL/L (ref 95–110)
CO2 SERPL-SCNC: 22 MMOL/L (ref 23–29)
CREAT SERPL-MCNC: 1 MG/DL (ref 0.5–1.4)
DIFFERENTIAL METHOD: ABNORMAL
EOSINOPHIL # BLD AUTO: 0 K/UL (ref 0–0.5)
EOSINOPHIL NFR BLD: 0.4 % (ref 0–8)
ERYTHROCYTE [DISTWIDTH] IN BLOOD BY AUTOMATED COUNT: 14.1 % (ref 11.5–14.5)
EST. GFR  (NO RACE VARIABLE): 59 ML/MIN/1.73 M^2
GLUCOSE SERPL-MCNC: 94 MG/DL (ref 70–110)
HCT VFR BLD AUTO: 39 % (ref 37–48.5)
HGB BLD-MCNC: 12.2 G/DL (ref 12–16)
IMM GRANULOCYTES # BLD AUTO: 0.03 K/UL (ref 0–0.04)
IMM GRANULOCYTES NFR BLD AUTO: 0.4 % (ref 0–0.5)
LYMPHOCYTES # BLD AUTO: 1.9 K/UL (ref 1–4.8)
LYMPHOCYTES NFR BLD: 26.2 % (ref 18–48)
MCH RBC QN AUTO: 28 PG (ref 27–31)
MCHC RBC AUTO-ENTMCNC: 31.3 G/DL (ref 32–36)
MCV RBC AUTO: 90 FL (ref 82–98)
MONOCYTES # BLD AUTO: 0.8 K/UL (ref 0.3–1)
MONOCYTES NFR BLD: 11.9 % (ref 4–15)
NEUTROPHILS # BLD AUTO: 4.3 K/UL (ref 1.8–7.7)
NEUTROPHILS NFR BLD: 60.5 % (ref 38–73)
NRBC BLD-RTO: 0 /100 WBC
PLATELET # BLD AUTO: 289 K/UL (ref 150–450)
PMV BLD AUTO: 11.1 FL (ref 9.2–12.9)
POTASSIUM SERPL-SCNC: 4 MMOL/L (ref 3.5–5.1)
PROT SERPL-MCNC: 8.2 G/DL (ref 6–8.4)
RBC # BLD AUTO: 4.35 M/UL (ref 4–5.4)
SODIUM SERPL-SCNC: 141 MMOL/L (ref 136–145)
WBC # BLD AUTO: 7.06 K/UL (ref 3.9–12.7)

## 2023-10-18 PROCEDURE — 85025 COMPLETE CBC W/AUTO DIFF WBC: CPT | Mod: HCNC | Performed by: EMERGENCY MEDICINE

## 2023-10-18 PROCEDURE — 99284 EMERGENCY DEPT VISIT MOD MDM: CPT | Mod: 25,HCNC

## 2023-10-18 PROCEDURE — 80053 COMPREHEN METABOLIC PANEL: CPT | Mod: HCNC | Performed by: EMERGENCY MEDICINE

## 2023-10-18 RX ORDER — CLINDAMYCIN HYDROCHLORIDE 150 MG/1
300 CAPSULE ORAL 4 TIMES DAILY
Qty: 56 CAPSULE | Refills: 0 | Status: SHIPPED | OUTPATIENT
Start: 2023-10-18 | End: 2023-10-25

## 2023-10-18 NOTE — ED PROVIDER NOTES
SCRIBE #1 NOTE: I, Meme Flor, am scribing for, and in the presence of, Kobi Dong Jr., MD. I have scribed the entire note.       History     Chief Complaint   Patient presents with    Leg Swelling     States she was bitten by something on R leg 1 month ago. Area is swollen, warm to touch, and discolored.      Review of patient's allergies indicates:  No Known Allergies      History of Present Illness     HPI    10/18/2023, 12:17 PM  History obtained from the patient      History of Present Illness: Tracy Najera is a 73 y.o. female patient with a PMHx of HTN, CVA, Obesity who presents to the Emergency Department for evaluation of leg swelling secondary to bug bite which onset a month ago.Pt states she does not know what kind of bug bit her but she did feel the bite. Now she is experiencing constant swelling. Symptoms are constant and moderate in severity. No mitigating or exacerbating factors reported. Patient denies any fever, cp, sob, weakness, dysuria, and all other sxs at this time. No prior Tx reported. No further complaints or concerns at this time.       Arrival mode: Personal vehicle      PCP: Mike Oakes MD        Past Medical History:  Past Medical History:   Diagnosis Date    Anticoagulant long-term use     Plavix    Arthritis     Bloody discharge from left nipple 6/1/2017    Cerebral artery occlusion with cerebral infarction 9/5/2013 2:35:44 PM    [facility] Historical - Cardiovascular: CVA (Cerebrovascular accident)-No Additional Notes    Complications affecting other specified body systems, hypertension 9/5/2013 2:32:20 PM    [facility] Historical - Cardiovascular: Hypertension-No Additional Notes    CVA (cerebral vascular accident) 2008    R hemiparesis, R hand contracture    HTN (hypertension)     Hyperlipidemia     Other and unspecified hyperlipidemia 9/5/2013 2:32:23 PM    [facility] Historical - Endocrine/Metabolic/Immune: Hyperlipidemia-No Additional Notes    Severe  obesity (BMI 35.0-39.9) with comorbidity     Special screening for malignant neoplasms, colon 4/16/2019       Past Surgical History:  Past Surgical History:   Procedure Laterality Date    BREAST BIOPSY Left 2017    COLONOSCOPY N/A 04/16/2019    Procedure: COLONOSCOPY;  Surgeon: Ulisses Jacobsen MD;  Location: [facility];  Service: Endoscopy;  Laterality: N/A;    HYSTERECTOMY      partial     TONSILLECTOMY           Family History:  Family History   Problem Relation Age of Onset    Hypertension Mother     Hypertension Sister     Cancer Neg Hx     Diabetes Neg Hx     Heart disease Neg Hx     Kidney disease Neg Hx     Hyperlipidemia Neg Hx        Social History:  Social History     Tobacco Use    Smoking status: Never    Smokeless tobacco: Never   Substance and Sexual Activity    Alcohol use: Not Currently    Drug use: No    Sexual activity: Yes     Partners: Male        Review of Systems     Review of Systems   Constitutional:  Negative for fever.   HENT:  Negative for sore throat.    Respiratory:  Negative for shortness of breath.    Cardiovascular:  Positive for leg swelling (right). Negative for chest pain.   Gastrointestinal:  Negative for nausea.   Genitourinary:  Negative for dysuria.   Musculoskeletal:  Negative for back pain.   Skin:  Negative for rash.   Neurological:  Negative for weakness.   Hematological:  Does not bruise/bleed easily.   All other systems reviewed and are negative.       Physical Exam     Initial Vitals [10/18/23 1146]   BP Pulse Resp Temp SpO2   (!) 186/80 105 (!) 22 98.1 °F (36.7 °C) 96 %      MAP       --          Physical Exam  Nursing Notes and Vital Signs Reviewed.  Constitutional: Patient is in no acute distress. Well-developed and well-nourished.  Head: Atraumatic. Normocephalic.  Eyes:  EOM intact.  No scleral icterus.  ENT: Mucous membranes are moist.  Nares clear   Neck:  Full ROM. No JVD.  Cardiovascular: Regular rate. Regular rhythm No murmurs, rubs, or gallops. Distal pulses  are 2+ and symmetric.  Normal pulses in the right foot  Pulmonary/Chest: No respiratory distress. Clear to auscultation bilaterally. No wheezing or rales.  Equal chest wall rise bilaterally  Abdominal: Soft and non-distended.  There is no tenderness.  No rebound, guarding, or rigidity. Good bowel sounds.  Genitourinary: No CVA tenderness.  No suprapubic tenderness  Musculoskeletal: Moves all extremities. No obvious deformities.  5 x 5 strength in all extremities   Skin: Warm and dry.  There is a mild contusion of right side of the lateral right calf.  There is no fluctuance or abscess.  There is some mild tenderness.  There is some fullness to the posterior right calf as well.  Full active range motion of the ankle as well as the knee.  No obvious abscess.  Neurological:  Alert, awake, and appropriate.  Normal speech.  No acute focal neurological deficits are appreciated.  Two through 12 intact bilaterally.  Normal sensation in the foot.  Psychiatric: Normal affect. Good eye contact. Appropriate in content.      ED Course   Procedures  ED Vital Signs:  Vitals:    10/18/23 1146 10/18/23 1317 10/18/23 1403   BP: (!) 186/80 (!) 192/92 (!) 179/97   Pulse: 105 85    Resp: (!) 22     Temp: 98.1 °F (36.7 °C)     TempSrc: Oral     SpO2: 96% 96%    Weight: 108.2 kg (238 lb 6.8 oz)         Abnormal Lab Results:  Labs Reviewed   CBC W/ AUTO DIFFERENTIAL - Abnormal; Notable for the following components:       Result Value    MCHC 31.3 (*)     All other components within normal limits   COMPREHENSIVE METABOLIC PANEL - Abnormal; Notable for the following components:    CO2 22 (*)     AST 53 (*)     ALT 55 (*)     eGFR 59 (*)     All other components within normal limits        All Lab Results:  Results for orders placed or performed during the hospital encounter of 10/18/23   CBC auto differential   Result Value Ref Range    WBC 7.06 3.90 - 12.70 K/uL    RBC 4.35 4.00 - 5.40 M/uL    Hemoglobin 12.2 12.0 - 16.0 g/dL    Hematocrit  39.0 37.0 - 48.5 %    MCV 90 82 - 98 fL    MCH 28.0 27.0 - 31.0 pg    MCHC 31.3 (L) 32.0 - 36.0 g/dL    RDW 14.1 11.5 - 14.5 %    Platelets 289 150 - 450 K/uL    MPV 11.1 9.2 - 12.9 fL    Immature Granulocytes 0.4 0.0 - 0.5 %    Gran # (ANC) 4.3 1.8 - 7.7 K/uL    Immature Grans (Abs) 0.03 0.00 - 0.04 K/uL    Lymph # 1.9 1.0 - 4.8 K/uL    Mono # 0.8 0.3 - 1.0 K/uL    Eos # 0.0 0.0 - 0.5 K/uL    Baso # 0.04 0.00 - 0.20 K/uL    nRBC 0 0 /100 WBC    Gran % 60.5 38.0 - 73.0 %    Lymph % 26.2 18.0 - 48.0 %    Mono % 11.9 4.0 - 15.0 %    Eosinophil % 0.4 0.0 - 8.0 %    Basophil % 0.6 0.0 - 1.9 %    Differential Method Automated    Comprehensive metabolic panel   Result Value Ref Range    Sodium 141 136 - 145 mmol/L    Potassium 4.0 3.5 - 5.1 mmol/L    Chloride 107 95 - 110 mmol/L    CO2 22 (L) 23 - 29 mmol/L    Glucose 94 70 - 110 mg/dL    BUN 13 8 - 23 mg/dL    Creatinine 1.0 0.5 - 1.4 mg/dL    Calcium 9.7 8.7 - 10.5 mg/dL    Total Protein 8.2 6.0 - 8.4 g/dL    Albumin 4.2 3.5 - 5.2 g/dL    Total Bilirubin 0.3 0.1 - 1.0 mg/dL    Alkaline Phosphatase 113 55 - 135 U/L    AST 53 (H) 10 - 40 U/L    ALT 55 (H) 10 - 44 U/L    eGFR 59 (A) >60 mL/min/1.73 m^2    Anion Gap 12 8 - 16 mmol/L        Imaging Results:  Imaging Results              US Lower Extremity Veins Right (Final result)  Result time 10/18/23 12:49:57      Final result by Alli Rose MD (10/18/23 12:49:57)                   Impression:      No evidence of deep venous thrombosis in the right lower extremity.      Electronically signed by: Alli Rose MD  Date:    10/18/2023  Time:    12:49               Narrative:    EXAMINATION:  US LOWER EXTREMITY VEINS RIGHT    CLINICAL HISTORY:  Localized edema    TECHNIQUE:  Duplex and color flow Doppler evaluation and graded compression of the right lower extremity veins was performed.    COMPARISON:  None    FINDINGS:  Right thigh veins: The common femoral, femoral, popliteal, upper greater saphenous, and deep  femoral veins are patent and free of thrombus. The veins are normally compressible and have normal phasic flow and augmentation response.    Right calf veins: The visualized calf veins are patent.    Contralateral CFV: The contralateral (left) common femoral vein is patent and free of thrombus.    Miscellaneous: None                                            ED Discussion     1:55 PM: Reassessed pt at this time. Discussed with pt all pertinent ED information and results. Discussed pt dx and plan of tx. Gave pt all f/u and return to the ED instructions. All questions and concerns were addressed at this time. Pt expresses understanding of information and instructions, and is comfortable with plan to discharge. Pt is stable for discharge.    I discussed with patient and/or family/caretaker that evaluation in the ED does not suggest any emergent or life threatening medical conditions requiring immediate intervention beyond what was provided in the ED, and I believe patient is safe for discharge.  Regardless, an unremarkable evaluation in the ED does not preclude the development or presence of a serious of life threatening condition. As such, patient was instructed to return immediately for any worsening or change in current symptoms.          Medical Decision Making  Differential diagnosis: DVT, abscess, infection, hematoma    Patient was evaluated history and physical examination.  DVT was ruled out with an ultrasound.  She does have some tenderness and swelling to the lateral aspect of the leg no fluctuance or abscess noted.  This could represent a hematoma versus cellulitis.  Will treat with clindamycin for rule out infection along with Motrin Tylenol for pain and close follow up with the primary care provider for re-evaluation.  Patient verbalized understanding agreement with the plan of care seems reliable.  She is safe for discharge in my opinion.    Amount and/or Complexity of Data Reviewed  Labs: ordered.  Decision-making details documented in ED Course.  Radiology: ordered. Decision-making details documented in ED Course.    Risk  OTC drugs.  Prescription drug management.  Decision regarding hospitalization.                ED Medication(s):  Medications - No data to display    Discharge Medication List as of 10/18/2023  1:56 PM        START taking these medications    Details   clindamycin (CLEOCIN) 150 MG capsule Take 2 capsules (300 mg total) by mouth 4 (four) times daily. for 7 days, Starting Wed 10/18/2023, Until Wed 10/25/2023, Normal              Follow-up Information       Mike Oakes MD.    Specialty: Family Medicine  Contact information:  [address] [facility] [address]  [phone]                                 Scribe Attestation:   Scribe #1: I performed the above scribed service and the documentation accurately describes the services I performed. I attest to the accuracy of the note.     Attending:   Physician Attestation Statement for Scribe #1: I, Kobi Dong Jr., MD, personally performed the services described in this documentation, as scribed by Meme Flor, in my presence, and it is both accurate and complete.           Clinical Impression       ICD-10-CM ICD-9-CM   1. Cellulitis of right leg  L03.115 682.6   2. Leg edema, right  R60.0 782.3   3. Hematoma of right lower leg  S80.11XA 924.10       Disposition:   Disposition: Discharged  Condition: Stable        Kobi Dong Jr., MD  10/18/23 1264

## 2023-10-18 NOTE — DISCHARGE INSTRUCTIONS
Clindamycin as prescribed for infection.  Use Motrin Tylenol for pain.  Follow up with her doctor 1-2 days for re-evaluation return as needed for any worsening symptoms, problems questions or concerns

## 2023-11-26 DIAGNOSIS — E78.5 HYPERLIPIDEMIA, UNSPECIFIED HYPERLIPIDEMIA TYPE: ICD-10-CM

## 2023-11-26 DIAGNOSIS — J31.0 CHRONIC RHINITIS: ICD-10-CM

## 2023-11-27 RX ORDER — FLUTICASONE PROPIONATE 50 MCG
SPRAY, SUSPENSION (ML) NASAL
Qty: 48 G | Refills: 0 | Status: SHIPPED | OUTPATIENT
Start: 2023-11-27

## 2023-11-27 RX ORDER — ATORVASTATIN CALCIUM 40 MG/1
40 TABLET, FILM COATED ORAL
Qty: 90 TABLET | Refills: 0 | Status: SHIPPED | OUTPATIENT
Start: 2023-11-27

## 2023-11-27 NOTE — TELEPHONE ENCOUNTER
Refill Decision Note   Tracy Najera  is requesting a refill authorization.  Brief Assessment and Rationale for Refill:  Approve     Medication Therapy Plan:   ED documentation reviewed. No changes to therapy noted. FOVS 1/25/24      Extended chart review required: Yes   Comments:     Note composed:5:04 PM 11/27/2023

## 2023-11-27 NOTE — TELEPHONE ENCOUNTER
Refill request routed to [facility] Review Pool for Pharmacist Review.

## 2024-01-25 ENCOUNTER — OFFICE VISIT (OUTPATIENT)
Dept: INTERNAL MEDICINE | Facility: CLINIC | Age: 74
End: 2024-01-25
Payer: MEDICARE

## 2024-01-25 ENCOUNTER — LAB VISIT (OUTPATIENT)
Dept: LAB | Facility: HOSPITAL | Age: 74
End: 2024-01-25
Attending: FAMILY MEDICINE
Payer: MEDICARE

## 2024-01-25 VITALS
TEMPERATURE: 98 F | WEIGHT: 237.63 LBS | BODY MASS INDEX: 43.47 KG/M2 | SYSTOLIC BLOOD PRESSURE: 148 MMHG | HEART RATE: 82 BPM | RESPIRATION RATE: 18 BRPM | DIASTOLIC BLOOD PRESSURE: 82 MMHG

## 2024-01-25 DIAGNOSIS — Z76.89 ENCOUNTER TO ESTABLISH CARE: ICD-10-CM

## 2024-01-25 DIAGNOSIS — I69.30 HISTORY OF CVA WITH RESIDUAL DEFICIT: ICD-10-CM

## 2024-01-25 DIAGNOSIS — R79.89 ELEVATED LIVER FUNCTION TESTS: ICD-10-CM

## 2024-01-25 DIAGNOSIS — E66.01 CLASS 3 SEVERE OBESITY DUE TO EXCESS CALORIES WITH SERIOUS COMORBIDITY AND BODY MASS INDEX (BMI) OF 40.0 TO 44.9 IN ADULT: ICD-10-CM

## 2024-01-25 DIAGNOSIS — Z86.73 HISTORY OF STROKE: ICD-10-CM

## 2024-01-25 DIAGNOSIS — R73.03 PREDIABETES: ICD-10-CM

## 2024-01-25 DIAGNOSIS — I10 ESSENTIAL HYPERTENSION: ICD-10-CM

## 2024-01-25 DIAGNOSIS — E78.5 HYPERLIPIDEMIA, UNSPECIFIED HYPERLIPIDEMIA TYPE: Primary | ICD-10-CM

## 2024-01-25 DIAGNOSIS — N18.31 STAGE 3A CHRONIC KIDNEY DISEASE: ICD-10-CM

## 2024-01-25 LAB
ALBUMIN SERPL BCP-MCNC: 4 G/DL (ref 3.5–5.2)
ALP SERPL-CCNC: 94 U/L (ref 55–135)
ALT SERPL W/O P-5'-P-CCNC: 22 U/L (ref 10–44)
ANION GAP SERPL CALC-SCNC: 9 MMOL/L (ref 8–16)
AST SERPL-CCNC: 24 U/L (ref 10–40)
BASOPHILS # BLD AUTO: 0.04 K/UL (ref 0–0.2)
BASOPHILS NFR BLD: 0.5 % (ref 0–1.9)
BILIRUB SERPL-MCNC: 0.3 MG/DL (ref 0.1–1)
BUN SERPL-MCNC: 12 MG/DL (ref 8–23)
CALCIUM SERPL-MCNC: 10.1 MG/DL (ref 8.7–10.5)
CHLORIDE SERPL-SCNC: 107 MMOL/L (ref 95–110)
CHOLEST SERPL-MCNC: 159 MG/DL (ref 120–199)
CHOLEST/HDLC SERPL: 3.5 {RATIO} (ref 2–5)
CO2 SERPL-SCNC: 25 MMOL/L (ref 23–29)
CREAT SERPL-MCNC: 1 MG/DL (ref 0.5–1.4)
DIFFERENTIAL METHOD BLD: ABNORMAL
EOSINOPHIL # BLD AUTO: 0.1 K/UL (ref 0–0.5)
EOSINOPHIL NFR BLD: 0.6 % (ref 0–8)
ERYTHROCYTE [DISTWIDTH] IN BLOOD BY AUTOMATED COUNT: 14.6 % (ref 11.5–14.5)
EST. GFR  (NO RACE VARIABLE): 59.5 ML/MIN/1.73 M^2
GLUCOSE SERPL-MCNC: 94 MG/DL (ref 70–110)
HCT VFR BLD AUTO: 39.5 % (ref 37–48.5)
HDLC SERPL-MCNC: 46 MG/DL (ref 40–75)
HDLC SERPL: 28.9 % (ref 20–50)
HGB BLD-MCNC: 12.3 G/DL (ref 12–16)
IMM GRANULOCYTES # BLD AUTO: 0.02 K/UL (ref 0–0.04)
IMM GRANULOCYTES NFR BLD AUTO: 0.2 % (ref 0–0.5)
LDLC SERPL CALC-MCNC: 96.2 MG/DL (ref 63–159)
LYMPHOCYTES # BLD AUTO: 1.9 K/UL (ref 1–4.8)
LYMPHOCYTES NFR BLD: 22.7 % (ref 18–48)
MCH RBC QN AUTO: 28 PG (ref 27–31)
MCHC RBC AUTO-ENTMCNC: 31.1 G/DL (ref 32–36)
MCV RBC AUTO: 90 FL (ref 82–98)
MONOCYTES # BLD AUTO: 0.7 K/UL (ref 0.3–1)
MONOCYTES NFR BLD: 8.3 % (ref 4–15)
NEUTROPHILS # BLD AUTO: 5.7 K/UL (ref 1.8–7.7)
NEUTROPHILS NFR BLD: 67.7 % (ref 38–73)
NONHDLC SERPL-MCNC: 113 MG/DL
NRBC BLD-RTO: 0 /100 WBC
PLATELET # BLD AUTO: 296 K/UL (ref 150–450)
PMV BLD AUTO: 12.3 FL (ref 9.2–12.9)
POTASSIUM SERPL-SCNC: 4.2 MMOL/L (ref 3.5–5.1)
PROT SERPL-MCNC: 7.9 G/DL (ref 6–8.4)
RBC # BLD AUTO: 4.39 M/UL (ref 4–5.4)
SODIUM SERPL-SCNC: 141 MMOL/L (ref 136–145)
TRIGL SERPL-MCNC: 84 MG/DL (ref 30–150)
WBC # BLD AUTO: 8.43 K/UL (ref 3.9–12.7)

## 2024-01-25 PROCEDURE — 36415 COLL VENOUS BLD VENIPUNCTURE: CPT | Performed by: FAMILY MEDICINE

## 2024-01-25 PROCEDURE — 99214 OFFICE O/P EST MOD 30 MIN: CPT | Mod: S$GLB,,, | Performed by: FAMILY MEDICINE

## 2024-01-25 PROCEDURE — 80053 COMPREHEN METABOLIC PANEL: CPT | Performed by: FAMILY MEDICINE

## 2024-01-25 PROCEDURE — 99999 PR PBB SHADOW E&M-EST. PATIENT-LVL III: CPT | Mod: PBBFAC,,, | Performed by: FAMILY MEDICINE

## 2024-01-25 PROCEDURE — 80061 LIPID PANEL: CPT | Performed by: FAMILY MEDICINE

## 2024-01-25 PROCEDURE — 85025 COMPLETE CBC W/AUTO DIFF WBC: CPT | Performed by: FAMILY MEDICINE

## 2024-01-25 PROCEDURE — 84443 ASSAY THYROID STIM HORMONE: CPT | Performed by: FAMILY MEDICINE

## 2024-01-25 PROCEDURE — 83036 HEMOGLOBIN GLYCOSYLATED A1C: CPT | Performed by: FAMILY MEDICINE

## 2024-01-25 RX ORDER — HYDROCHLOROTHIAZIDE 12.5 MG/1
12.5 TABLET ORAL DAILY
Qty: 90 TABLET | Refills: 1 | Status: SHIPPED | OUTPATIENT
Start: 2024-01-25 | End: 2024-04-09 | Stop reason: SDUPTHER

## 2024-01-25 NOTE — PATIENT INSTRUCTIONS
Dr. Dyson     I am a little worried about her blood pressure.  It is important that I have excellent blood pressure to prevent further strokes.  I want you to continue your amlodipine and your valsartan.    Today I am adding hydrochlorothiazide.  This is a low dose diuretic that may help get your pressure is a little bit better.  I do want you to check your blood pressure on a daily basis.  Please write them down.  Please come back and see me in 4-6 weeks and remember to bring your blood pressure log and your blood pressure machine.  Please do not forget everyone always forgets.    We will go over the labs I am ordering today.  It is possible I maybe starting you on a sugar medicine because I am worried about your prediabetes.

## 2024-01-25 NOTE — PROGRESS NOTES
"Subjective:       Patient ID: Tracy Najera is a 73 y.o. female.    Chief Complaint: Follow-up (Patient had gotten a "bite" on her leg and it became swollen, she went to the ER for it afterwards and was started on abt. )    Follow-up        Patient Active Problem List   Diagnosis    History of CVA with residual deficit    Hyperlipidemia    Essential hypertension    Calcification of aorta    Osteopenia of multiple sites    Class 3 severe obesity due to excess calories with serious comorbidity and body mass index (BMI) of 40.0 to 44.9 in adult    History of colon polyps    Bilateral lower extremity edema    Hemiparesis affecting right side as late effect of cerebrovascular accident (CVA)    Chronic pain of left knee    Decreased hearing of both ears    Prediabetes       Past Medical History:   Diagnosis Date    Anticoagulant long-term use     Plavix    Arthritis     Bloody discharge from left nipple 6/1/2017    Cerebral artery occlusion with cerebral infarction 9/5/2013 2:35:44 PM    [facility] Historical - Cardiovascular: CVA (Cerebrovascular accident)-No Additional Notes    Complications affecting other specified body systems, hypertension 9/5/2013 2:32:20 PM    [facility] Historical - Cardiovascular: Hypertension-No Additional Notes    CVA (cerebral vascular accident) 2008    R hemiparesis, R hand contracture    HTN (hypertension)     Hyperlipidemia     Other and unspecified hyperlipidemia 9/5/2013 2:32:23 PM    [facility] Historical - Endocrine/Metabolic/Immune: Hyperlipidemia-No Additional Notes    Severe obesity (BMI 35.0-39.9) with comorbidity     Special screening for malignant neoplasms, colon 4/16/2019       Past Surgical History:   Procedure Laterality Date    BREAST BIOPSY Left 2017    COLONOSCOPY N/A 04/16/2019    Procedure: COLONOSCOPY;  Surgeon: Ulisses Jacobsen MD;  Location: [facility];  Service: Endoscopy;  Laterality: N/A;    HYSTERECTOMY      partial     TONSILLECTOMY         Family History "   Problem Relation Age of Onset    Hypertension Mother     Hypertension Sister     Cancer Neg Hx     Diabetes Neg Hx     Heart disease Neg Hx     Kidney disease Neg Hx     Hyperlipidemia Neg Hx        Social History     Tobacco Use   Smoking Status Never    Passive exposure: Never   Smokeless Tobacco Never       Wt Readings from Last 5 Encounters:   01/25/24 107.8 kg (237 lb 10.5 oz)   10/18/23 108.2 kg (238 lb 6.8 oz)   07/25/23 107.4 kg (236 lb 12.4 oz)   07/19/23 108.9 kg (240 lb)   01/18/23 108 kg (238 lb 3.3 oz)       For further HPI details, see assessment and plan.    Review of Systems  patient is feeling good with no active complaints  Objective:      Vitals:    01/25/24 0904   BP: (!) 148/82   Pulse: 82   Resp: 18   Temp: 97.7 °F (36.5 °C)       Physical Exam  Constitutional:       Appearance: She is not ill-appearing.   Neurological:      Mental Status: She is alert.       patient is well groomed and pleasant.  She has in no apparent distress.  She seems to be at her baseline from a respiratory, neurologic, psychiatric state.  Assessment:       1. Hyperlipidemia, unspecified hyperlipidemia type    2. Essential hypertension    3. Prediabetes    4. Encounter to establish care    5. History of stroke        Plan:         Patient presents for follow-up encounter     In October she went to the emergency department for a cellulitis.  She was given antibiotics in the issue seemed to improve.    I did review the labs that were drawn while in the emergency department.  Noted an elevation in liver function and a mild decrease in renal function.  I would like to monitor both.  Patient's GFR has been slightly below 60 for the past 2 checks.  Suspect chronic kidney disease stage IIIA   I want her to avoid ibuprofen and similar drugs (NSAIDs).    Obesity  Patient is still struggles with losing weight.  Encourage caloric deficit in regular exercise.  We will continue to monitor.    Prediabetes  Last A1c was 6 months ago.   5.7.  It was 5.76 months prior to that.  Plan to update A1c as well.    History of stroke  No substantial ongoing deficits.  Potentially some right-sided weakness but does not seem to limit her.  She has a hard time really appreciating whether it is weak or not  Patient is on Lipitor 40 mg   Patient was on Plavix 75 mg.  Continue medications.      Hypertension   Patient is taking amlodipine 10 along with the valsartan 320.    BP Readings from Last 3 Encounters:   01/25/24 (!) 148/82   10/18/23 (!) 179/97   07/25/23 122/78     Patient does have a blood pressure machine at home.  She reports she checks it on a regular basis.  She reports she runs between 130-140 over 70s to 80s.  I am concerned given the in office reading is elevated.  Additionally was elevated at the last check although that was in the emergency department.  Plan to add a low dose of hydrochlorothiazide today.  I would like to see her again in about 4-6 weeks.  I want her to bring in her blood pressure machine so I can check it for accuracy.  I am asking that she writes down her blood pressures so we can review the log together.  In addition today's hydrochlorothiazide is to be used with her other blood pressure medicines.    Allergic rhinitis   Patient continues to utilize her nasal sprays.  Astelin and Flonase.  Continue med    Plan a follow-up visit in 4-6 weeks  This note was verbally dictated, please excuse any type errors.

## 2024-01-26 LAB
ESTIMATED AVG GLUCOSE: 114 MG/DL (ref 68–131)
HBA1C MFR BLD: 5.6 % (ref 4–5.6)
TSH SERPL DL<=0.005 MIU/L-ACNC: 1.81 UIU/ML (ref 0.4–4)

## 2024-02-29 ENCOUNTER — OFFICE VISIT (OUTPATIENT)
Dept: INTERNAL MEDICINE | Facility: CLINIC | Age: 74
End: 2024-02-29
Payer: MEDICARE

## 2024-02-29 VITALS
RESPIRATION RATE: 18 BRPM | HEART RATE: 62 BPM | SYSTOLIC BLOOD PRESSURE: 139 MMHG | BODY MASS INDEX: 42.07 KG/M2 | TEMPERATURE: 98 F | WEIGHT: 230 LBS | DIASTOLIC BLOOD PRESSURE: 61 MMHG

## 2024-02-29 DIAGNOSIS — I10 ESSENTIAL HYPERTENSION: ICD-10-CM

## 2024-02-29 DIAGNOSIS — N18.31 STAGE 3A CHRONIC KIDNEY DISEASE: ICD-10-CM

## 2024-02-29 DIAGNOSIS — Z12.39 ENCOUNTER FOR SCREENING FOR MALIGNANT NEOPLASM OF BREAST, UNSPECIFIED SCREENING MODALITY: Primary | ICD-10-CM

## 2024-02-29 DIAGNOSIS — Z79.899 ENCOUNTER FOR LONG-TERM (CURRENT) USE OF MEDICATIONS: ICD-10-CM

## 2024-02-29 DIAGNOSIS — E66.01 CLASS 3 SEVERE OBESITY DUE TO EXCESS CALORIES WITH SERIOUS COMORBIDITY AND BODY MASS INDEX (BMI) OF 40.0 TO 44.9 IN ADULT: ICD-10-CM

## 2024-02-29 DIAGNOSIS — Z86.73 HISTORY OF STROKE: ICD-10-CM

## 2024-02-29 DIAGNOSIS — Z12.11 SCREENING FOR COLON CANCER: ICD-10-CM

## 2024-02-29 PROCEDURE — 99214 OFFICE O/P EST MOD 30 MIN: CPT | Mod: S$GLB,,, | Performed by: FAMILY MEDICINE

## 2024-02-29 PROCEDURE — 99999 PR PBB SHADOW E&M-EST. PATIENT-LVL IV: CPT | Mod: PBBFAC,,, | Performed by: FAMILY MEDICINE

## 2024-02-29 NOTE — PROGRESS NOTES
Subjective:       Patient ID: Tracy Najera is a 73 y.o. female.    Chief Complaint: Follow-up (5W F/U - patient brought home bp cuff and readings. //Calibrated - manual was 148/88 and home was 142/77)    73 f      Follow-up  Pertinent negatives include no chest pain, chills, congestion, fever or rash.       Patient Active Problem List   Diagnosis    History of CVA with residual deficit    Hyperlipidemia    Essential hypertension    Calcification of aorta    Osteopenia of multiple sites    Class 3 severe obesity due to excess calories with serious comorbidity and body mass index (BMI) of 40.0 to 44.9 in adult    History of colon polyps    Bilateral lower extremity edema    Hemiparesis affecting right side as late effect of cerebrovascular accident (CVA)    Chronic pain of left knee    Decreased hearing of both ears    Prediabetes       Past Medical History:   Diagnosis Date    Anticoagulant long-term use     Plavix    Arthritis     Bloody discharge from left nipple 6/1/2017    Cerebral artery occlusion with cerebral infarction 9/5/2013 2:35:44 PM    [facility] Historical - Cardiovascular: CVA (Cerebrovascular accident)-No Additional Notes    Complications affecting other specified body systems, hypertension 9/5/2013 2:32:20 PM    [facility] Historical - Cardiovascular: Hypertension-No Additional Notes    CVA (cerebral vascular accident) 2008    R hemiparesis, R hand contracture    HTN (hypertension)     Hyperlipidemia     Other and unspecified hyperlipidemia 9/5/2013 2:32:23 PM    [facility] Historical - Endocrine/Metabolic/Immune: Hyperlipidemia-No Additional Notes    Severe obesity (BMI 35.0-39.9) with comorbidity     Special screening for malignant neoplasms, colon 4/16/2019       Past Surgical History:   Procedure Laterality Date    BREAST BIOPSY Left 2017    COLONOSCOPY N/A 04/16/2019    Procedure: COLONOSCOPY;  Surgeon: Ulisses Jacobsen MD;  Location: [facility];  Service: Endoscopy;  Laterality: N/A;     HYSTERECTOMY      partial     TONSILLECTOMY         Family History   Problem Relation Age of Onset    Hypertension Mother     Hypertension Sister     Cancer Neg Hx     Diabetes Neg Hx     Heart disease Neg Hx     Kidney disease Neg Hx     Hyperlipidemia Neg Hx        Social History     Tobacco Use   Smoking Status Never    Passive exposure: Never   Smokeless Tobacco Never       Wt Readings from Last 5 Encounters:   02/29/24 104.3 kg (230 lb)   01/25/24 107.8 kg (237 lb 10.5 oz)   10/18/23 108.2 kg (238 lb 6.8 oz)   07/25/23 107.4 kg (236 lb 12.4 oz)   07/19/23 108.9 kg (240 lb)       For further HPI details, see assessment and plan.    Review of Systems   Constitutional:  Negative for chills and fever.   HENT:  Negative for congestion, sinus pressure and voice change.    Eyes:  Negative for visual disturbance.   Respiratory:  Negative for shortness of breath.    Cardiovascular:  Negative for chest pain.   Gastrointestinal:  Negative for constipation and diarrhea.   Genitourinary:  Negative for difficulty urinating.   Musculoskeletal:  Negative for gait problem.   Skin:  Negative for rash.   Neurological:  Negative for dizziness and light-headedness.   Psychiatric/Behavioral:  Negative for dysphoric mood.        Objective:      Vitals:    02/29/24 0856   BP: 139/61   Pulse:    Resp:    Temp:        Physical Exam  Constitutional:       General: She is not in acute distress.     Appearance: She is not ill-appearing.   Pulmonary:      Effort: Pulmonary effort is normal. No respiratory distress.   Neurological:      General: No focal deficit present.      Mental Status: She is alert.   Psychiatric:         Mood and Affect: Mood normal.         Behavior: Behavior normal.         Assessment:       1. Encounter for screening for malignant neoplasm of breast, unspecified screening modality    2. Screening for colon cancer    3. Encounter for long-term (current) use of medications    4. Stage 3a chronic kidney disease     5. Essential hypertension    6. History of stroke    7. Class 3 severe obesity due to excess calories with serious comorbidity and body mass index (BMI) of 40.0 to 44.9 in adult        Plan:     Presents for follow up   Doing well.      Hypertension  At last visit had concerns that her blood pressure was uncontrolled.  We added a low dose of hydrochlorothiazide to her amlodipine and valsartan.  Patient has been monitoring her blood pressure at home.  Her blood pressure at home is much more appropriate and consistently controlled.  I will indicate that in her chart.  Her blood pressure machine was also checked.  It is reasonably reliable and we will continue its usage.  Encouraged she continues her medications as is.  Encouraged she continues to monitor her blood pressure at home.    Chronic kidney disease stage IIIA   Her GFR has been persistently mildly diminished.  Very near 60.  Has run 59 for the past 7 months.  She knows to avoid nonsteroidal anti-inflammatory drugs and only utilizes Tylenol she has aches and pains.  Her creatinine has been normal.  Renal function not overly alarming but I do want to protect it with consistently good blood pressure control, blood glucose control, and avoiding things that can damage her renal function.  Encouraged weight loss and adequate hydration     History of stroke   Continue her Plavix, statin, good blood pressure control.  No new neurologic features.  Patient seems to be doing well.  Continue to monitor.    Cancer screening   Both her mammogram and colonoscopy will be due in April.  I have ordered both.  Will ask staff to schedule mammogram.  Have advise the patient to expect a telephone call for scheduling and instructions for her colonoscopy.    Obesity  Patient's body mass index is 42.07.  Weight loss would be quite beneficial for all of her chronic conditions.  We have discussed this in the past.  Encouraged consistent caloric deficit in regular physical activity.   To get more aggressive with lifestyle improvement I do feel she would benefit from a consultation with the lifestyle and wellness clinic.  Placing order an ask the staff to arrange    Plan to order labs to be drawn in roughly 6 months for monitoring purposes.  Plan a follow-up visit a few days later  This note was verbally dictated, please excuse any type errors.

## 2024-03-04 ENCOUNTER — HOSPITAL ENCOUNTER (OUTPATIENT)
Dept: PREADMISSION TESTING | Facility: HOSPITAL | Age: 74
Discharge: HOME | End: 2024-03-04
Attending: FAMILY MEDICINE
Payer: MEDICARE

## 2024-03-04 ENCOUNTER — TELEPHONE (OUTPATIENT)
Dept: PREADMISSION TESTING | Facility: HOSPITAL | Age: 74
End: 2024-03-04
Payer: MEDICARE

## 2024-03-04 DIAGNOSIS — Z12.11 SCREENING FOR COLON CANCER: Primary | ICD-10-CM

## 2024-03-04 RX ORDER — SODIUM, POTASSIUM,MAG SULFATES 17.5-3.13G
1 SOLUTION, RECONSTITUTED, ORAL ORAL DAILY
Qty: 1 KIT | Refills: 0 | Status: SHIPPED | OUTPATIENT
Start: 2024-03-04 | End: 2024-03-06

## 2024-03-04 NOTE — TELEPHONE ENCOUNTER
Pt has been scheduled for a Colonoscopy and is currently taking Plavix. This medication will need to be held 5 days prior. Please advise if this is feasible. Thanks in advance.

## 2024-03-24 ENCOUNTER — HOSPITAL ENCOUNTER (EMERGENCY)
Facility: HOSPITAL | Age: 74
Discharge: HOME | End: 2024-03-24
Attending: EMERGENCY MEDICINE
Payer: MEDICARE

## 2024-03-24 VITALS
SYSTOLIC BLOOD PRESSURE: 142 MMHG | RESPIRATION RATE: 20 BRPM | HEART RATE: 87 BPM | OXYGEN SATURATION: 100 % | DIASTOLIC BLOOD PRESSURE: 74 MMHG | TEMPERATURE: 98 F | WEIGHT: 243.63 LBS | BODY MASS INDEX: 44.56 KG/M2

## 2024-03-24 DIAGNOSIS — R05.9 COUGH: ICD-10-CM

## 2024-03-24 DIAGNOSIS — J18.9 PNEUMONIA OF RIGHT MIDDLE LOBE DUE TO INFECTIOUS ORGANISM: Primary | ICD-10-CM

## 2024-03-24 LAB
ALBUMIN SERPL BCP-MCNC: 4 G/DL (ref 3.5–5.2)
ALP SERPL-CCNC: 105 U/L (ref 55–135)
ALT SERPL W/O P-5'-P-CCNC: 22 U/L (ref 10–44)
ANION GAP SERPL CALC-SCNC: 11 MMOL/L (ref 8–16)
AST SERPL-CCNC: 23 U/L (ref 10–40)
BASOPHILS # BLD AUTO: 0.04 K/UL (ref 0–0.2)
BASOPHILS NFR BLD: 0.3 % (ref 0–1.9)
BILIRUB SERPL-MCNC: 0.2 MG/DL (ref 0.1–1)
BUN SERPL-MCNC: 20 MG/DL (ref 8–23)
CALCIUM SERPL-MCNC: 9.7 MG/DL (ref 8.7–10.5)
CHLORIDE SERPL-SCNC: 108 MMOL/L (ref 95–110)
CO2 SERPL-SCNC: 23 MMOL/L (ref 23–29)
CREAT SERPL-MCNC: 1.1 MG/DL (ref 0.5–1.4)
DIFFERENTIAL METHOD BLD: ABNORMAL
EOSINOPHIL # BLD AUTO: 0.3 K/UL (ref 0–0.5)
EOSINOPHIL NFR BLD: 2.4 % (ref 0–8)
ERYTHROCYTE [DISTWIDTH] IN BLOOD BY AUTOMATED COUNT: 14.2 % (ref 11.5–14.5)
EST. GFR  (NO RACE VARIABLE): 53 ML/MIN/1.73 M^2
GLUCOSE SERPL-MCNC: 122 MG/DL (ref 70–110)
HCT VFR BLD AUTO: 37.8 % (ref 37–48.5)
HGB BLD-MCNC: 12.1 G/DL (ref 12–16)
IMM GRANULOCYTES # BLD AUTO: 0.04 K/UL (ref 0–0.04)
IMM GRANULOCYTES NFR BLD AUTO: 0.3 % (ref 0–0.5)
INFLUENZA A, MOLECULAR: NEGATIVE
INFLUENZA B, MOLECULAR: NEGATIVE
LYMPHOCYTES # BLD AUTO: 2.2 K/UL (ref 1–4.8)
LYMPHOCYTES NFR BLD: 19.2 % (ref 18–48)
MCH RBC QN AUTO: 28.6 PG (ref 27–31)
MCHC RBC AUTO-ENTMCNC: 32 G/DL (ref 32–36)
MCV RBC AUTO: 89 FL (ref 82–98)
MONOCYTES # BLD AUTO: 1 K/UL (ref 0.3–1)
MONOCYTES NFR BLD: 8.5 % (ref 4–15)
NEUTROPHILS # BLD AUTO: 7.9 K/UL (ref 1.8–7.7)
NEUTROPHILS NFR BLD: 69.3 % (ref 38–73)
NRBC BLD-RTO: 0 /100 WBC
PLATELET # BLD AUTO: 300 K/UL (ref 150–450)
PMV BLD AUTO: 11.4 FL (ref 9.2–12.9)
POTASSIUM SERPL-SCNC: 3.8 MMOL/L (ref 3.5–5.1)
PROT SERPL-MCNC: 8 G/DL (ref 6–8.4)
RBC # BLD AUTO: 4.23 M/UL (ref 4–5.4)
SARS-COV-2 RDRP RESP QL NAA+PROBE: NEGATIVE
SODIUM SERPL-SCNC: 142 MMOL/L (ref 136–145)
SPECIMEN SOURCE: NORMAL
WBC # BLD AUTO: 11.45 K/UL (ref 3.9–12.7)

## 2024-03-24 PROCEDURE — 80053 COMPREHEN METABOLIC PANEL: CPT | Performed by: NURSE PRACTITIONER

## 2024-03-24 PROCEDURE — 99284 EMERGENCY DEPT VISIT MOD MDM: CPT | Mod: 25

## 2024-03-24 PROCEDURE — 85025 COMPLETE CBC W/AUTO DIFF WBC: CPT | Performed by: NURSE PRACTITIONER

## 2024-03-24 PROCEDURE — U0002 COVID-19 LAB TEST NON-CDC: HCPCS | Performed by: NURSE PRACTITIONER

## 2024-03-24 PROCEDURE — 87502 INFLUENZA DNA AMP PROBE: CPT | Performed by: NURSE PRACTITIONER

## 2024-03-24 RX ORDER — BENZONATATE 100 MG/1
100 CAPSULE ORAL 3 TIMES DAILY PRN
Qty: 20 CAPSULE | Refills: 0 | Status: SHIPPED | OUTPATIENT
Start: 2024-03-24 | End: 2024-04-03

## 2024-03-24 RX ORDER — LEVOFLOXACIN 500 MG/1
500 TABLET, FILM COATED ORAL DAILY
Qty: 5 TABLET | Refills: 0 | Status: SHIPPED | OUTPATIENT
Start: 2024-03-24 | End: 2024-03-29

## 2024-03-25 ENCOUNTER — PATIENT OUTREACH (OUTPATIENT)
Dept: EMERGENCY MEDICINE | Facility: HOSPITAL | Age: 74
End: 2024-03-25
Payer: MEDICARE

## 2024-03-25 NOTE — PROGRESS NOTES
Arcelia Lester  ED Navigator  Emergency Department    Project: [facility] ED Navigator  Role: Community Health Worker    Date: 03/25/2024  Patient Name: Tracy Najera  MRN: [id number]  PCP: Mike Oakes MD    Assessment:     Tracy Najera is a 73 y.o. female who has presented to ED for Cough; Pneumonia of right middle lobe due to infectious organism. Patient has visited the ED 1 times in the past 3 months. Patient did not contact PCP.     ED Navigator Initial Assessment    ED Navigator Enrollment Documentation  Consent to Services  Does patient consent to completing the assessment?: Yes  Contact  Method of Initial Contact: Phone  Transportation  Does the patient have issues with Transportation?: No  Does the patient have transportation to and from healthcare appointments?: Yes  Insurance Coverage  Do you have coverage/adequate coverage?: Yes  Type/kind of coverage: PHN  Is patient able to afford co-pays/deductibles?: Yes  Is patient able to afford HME or supplies?: Yes  Does patient have an established [facility] PCP?: Yes  Able to access?: Yes  Does the patient have a lack of adequate coverage?: No  Specialist Appointment  Did the patient come to the ED to see a specialist?: No  Does the patient have a pending specialist referral?: No  Does the patient have a specialist appointment made?: No  PCP Follow Up Appointment  Has the patient had an appointment with a primary care provider in the past year?: Yes  Approximate date: 2/29/24  Provider: Mike Oakes MD  Does the patient have a follow up appontment with a PCP?: No  When was the last time you saw your PCP?: 2/29/24  Why does the patient not have a follow up scheduled?: Other (see comments) (Comment: Pt accepted appt scheduling assistance)  Medications  Is patient able to afford medication?: Yes  Is patient unable to get medication due to lack of transportation?: No  Psychological  Food  Communication/Education  Does the patient have limited English  proficiency/English not primary language?: No  Does patient have low literacy and/or low health literacy?: Yes  Does patient have concerns with care?: No  Does patient have dissatisfaction with care?: No  Other Financial Concerns  Other Social Barriers/Concerns  Does the patient have any additional barriers or concerns?: Other (see comments) (Comment: Chronic Conditions)  Primary Barrier  Barriers identified: Cognitive barrier (health literacy, language and communication, etc.)  Root Cause of ED Utilization: Chronic Conditions  Plan to address Chronic Conditions: Schedule appointment for patient with their PCP/specialist per ED discharge instructions  Next steps: Provided Education  Additional Documentation: Pt was seen in the ED on 2/28/24 for Cough; Pneumonia of right middle lobe due to infectious organism. I spoke with pt for Post ED visit follow up navigation to assist with scheduling a 7-day Post ED visit follow up appt. Pt states that she had not yet contacted pcp to schedule a follow up appt and accepted scheduling assistance. I was unable to schedule appt for pt and sent a request for assistance to pcp.  Pt will be contacted directly for scheduling. Pt does not have transportation issues and has no additional needs at this time.     Arcelia Lester           Social History     Socioeconomic History    Marital status:     Number of children: 1   Occupational History     Employer: not employ   Tobacco Use    Smoking status: Never     Passive exposure: Never    Smokeless tobacco: Never   Substance and Sexual Activity    Alcohol use: Not Currently    Drug use: No    Sexual activity: Yes     Partners: Male     Social Determinants of Health     Financial Resource Strain: Low Risk  (7/19/2023)    Overall Financial Resource Strain (CARDIA)     Difficulty of Paying Living Expenses: Not very hard   Food Insecurity: Food Insecurity Present (7/19/2023)    Hunger Vital Sign     Worried About Running Out of Food  in the Last Year: Sometimes true     Ran Out of Food in the Last Year: Sometimes true   Transportation Needs: No Transportation Needs (7/19/2023)    PRAPARE - Transportation     Lack of Transportation (Medical): No     Lack of Transportation (Non-Medical): No   Physical Activity: Sufficiently Active (7/19/2023)    Exercise Vital Sign     Days of Exercise per Week: 7 days     Minutes of Exercise per Session: 60 min   Stress: No Stress Concern Present (7/19/2023)    Citizen of [place] of Occupational Health - Occupational Stress Questionnaire     Feeling of Stress : Not at all   Social Connections: Socially Integrated (7/19/2023)    Social Connection and Isolation Panel [NHANES]     Frequency of Communication with Friends and Family: More than three times a week     Frequency of Social Gatherings with Friends and Family: Once a week     Attends Mandaen Services: More than 4 times per year     Active Member of Clubs or Organizations: Yes     Attends Club or Organization Meetings: More than 4 times per year     Marital Status:    Housing Stability: Low Risk  (7/19/2023)    Housing Stability Vital Sign     Unable to Pay for Housing in the Last Year: No     Number of Places Lived in the Last Year: 1     Unstable Housing in the Last Year: No       Plan:     Pt was seen in the ED on 2/28/24 for Cough; Pneumonia of right middle lobe due to infectious organism. I spoke with pt for Post ED visit follow up navigation to assist with scheduling a 7-day Post ED visit follow up appt. Pt states that she had not yet contacted pcp to schedule a follow up appt and accepted scheduling assistance. I was unable to schedule appt for pt and sent a request for assistance to pcp.  Pt will be contacted directly for scheduling. Pt does not have transportation issues and has no additional needs at this time.     Arcelia Lester    Appointment made with: Mike Oakes MD

## 2024-03-25 NOTE — ED PROVIDER NOTES
Encounter Date: 3/24/2024       History     Chief Complaint   Patient presents with    Sore Throat    Cough     Sore throat and cough x 3 days. Pt has been taking cough medication and tylenol at home. +N/V     Patient is a 73-year-old female that presents with complaints of sore throat and cough.  Onset of symptoms was 3 days ago.  Denies any fever.  Denies any relief with over-the-counter medications taken at home.  Patient shows no signs of distress at this time      Review of patient's allergies indicates:  No Known Allergies  Past Medical History:   Diagnosis Date    Anticoagulant long-term use     Plavix    Arthritis     Bloody discharge from left nipple 6/1/2017    Cerebral artery occlusion with cerebral infarction 9/5/2013 2:35:44 PM    [facility] Historical - Cardiovascular: CVA (Cerebrovascular accident)-No Additional Notes    Complications affecting other specified body systems, hypertension 9/5/2013 2:32:20 PM    [facility] Historical - Cardiovascular: Hypertension-No Additional Notes    CVA (cerebral vascular accident) 2008    R hemiparesis, R hand contracture    HTN (hypertension)     Hyperlipidemia     Other and unspecified hyperlipidemia 9/5/2013 2:32:23 PM    [facility] Historical - Endocrine/Metabolic/Immune: Hyperlipidemia-No Additional Notes    Severe obesity (BMI 35.0-39.9) with comorbidity     Special screening for malignant neoplasms, colon 4/16/2019     Past Surgical History:   Procedure Laterality Date    BREAST BIOPSY Left 2017    COLONOSCOPY N/A 04/16/2019    Procedure: COLONOSCOPY;  Surgeon: Ulisses Jacobsen MD;  Location: [facility];  Service: Endoscopy;  Laterality: N/A;    HYSTERECTOMY      partial     TONSILLECTOMY       Family History   Problem Relation Age of Onset    Hypertension Mother     Hypertension Sister     Cancer Neg Hx     Diabetes Neg Hx     Heart disease Neg Hx     Kidney disease Neg Hx     Hyperlipidemia Neg Hx      Social History     Tobacco Use    Smoking status:  Never     Passive exposure: Never    Smokeless tobacco: Never   Substance Use Topics    Alcohol use: Not Currently    Drug use: No     Review of Systems   Constitutional:  Negative for fever.   HENT:  Positive for congestion. Negative for sore throat.    Respiratory:  Positive for cough and shortness of breath.    Cardiovascular:  Negative for chest pain.   Gastrointestinal:  Negative for nausea.   Genitourinary:  Negative for dysuria.   Musculoskeletal:  Negative for back pain.   Skin:  Negative for rash.   Neurological:  Negative for weakness.   Hematological:  Does not bruise/bleed easily.       Physical Exam     Initial Vitals [03/24/24 1839]   BP Pulse Resp Temp SpO2   (!) 184/81 93 20 98.6 °F (37 °C) 100 %      MAP       --         Physical Exam    Nursing note and vitals reviewed.  Constitutional: She appears well-developed and well-nourished.   HENT:   Head: Normocephalic and atraumatic.   Eyes: EOM are normal. Pupils are equal, round, and reactive to light.   Neck: Neck supple.   Normal range of motion.  Cardiovascular:  Normal rate, regular rhythm, normal heart sounds and intact distal pulses.           Pulmonary/Chest: Breath sounds normal. No respiratory distress. She has no wheezes. She has no rhonchi. She has no rales.   Abdominal: Abdomen is soft. Bowel sounds are normal.   Musculoskeletal:         General: Normal range of motion.      Cervical back: Normal range of motion and neck supple.     Neurological: She is alert and oriented to person, place, and time. She has normal strength and normal reflexes.   Skin: Skin is warm and dry.         ED Course   Procedures  Labs Reviewed   CBC W/ AUTO DIFFERENTIAL - Abnormal; Notable for the following components:       Result Value    Gran # (ANC) 7.9 (*)     All other components within normal limits   COMPREHENSIVE METABOLIC PANEL - Abnormal; Notable for the following components:    Glucose 122 (*)     eGFR 53 (*)     All other components within normal limits    INFLUENZA A & B BY MOLECULAR   SARS-COV-2 RNA AMPLIFICATION, QUAL          Imaging Results              X-Ray Chest 1 View (Final result)  Result time 03/24/24 19:23:05      Final result by Odilon Nesbitt MD (03/24/24 19:23:05)                   Impression:      As above      Electronically signed by: Odilon Nesbitt  Date:    03/24/2024  Time:    19:23               Narrative:    EXAMINATION:  XR CHEST 1 VIEW    CLINICAL HISTORY:  Cough, unspecified    TECHNIQUE:  Single frontal view of the chest was performed.    COMPARISON:  None    FINDINGS:  Cardiomegaly with perihilar bronchial thickening suggestive of pulmonary edema.  Hazy opacity in the medial right lung base suggestive of right middle lobe pneumonia or alveolar edema.  Correlate clinically for CHF versus or early pneumonia    The hilar and mediastinal contours are unremarkable.    Bones are intact.                                       Medications - No data to display  Medical Decision Making  Patient informed of lab and imaging results.  Instructed to follow-up with primary care physician for further evaluation and management.  Patient was instructed to return to the emergency room with any worsening symptoms.  Patient shows no signs of distress at time of discharge.    Amount and/or Complexity of Data Reviewed  Labs: ordered.  Radiology: ordered.                                      Clinical Impression:  Final diagnoses:  [R05.9] Cough  [J18.9] Pneumonia of right middle lobe due to infectious organism (Primary)          ED Disposition Condition    Discharge Stable          ED Prescriptions       Medication Sig Dispense Start Date End Date Auth. Provider    levoFLOXacin (LEVAQUIN) 500 MG tablet Take 1 tablet (500 mg total) by mouth once daily. for 5 days 5 tablet 3/24/2024 3/29/2024 Allan Rodrigues NP    benzonatate (TESSALON) 100 MG capsule Take 1 capsule (100 mg total) by mouth 3 (three) times daily as needed for Cough. 20 capsule 3/24/2024 4/3/2024  Allan Rodrigues NP          Follow-up Information       Follow up With Specialties Details Why Contact Info    Mike Oakes MD Family Medicine  As needed [address] [facility] [address] [phone]               Allan Rodrigues NP  03/24/24 2031

## 2024-03-26 ENCOUNTER — TELEPHONE (OUTPATIENT)
Dept: INTERNAL MEDICINE | Facility: CLINIC | Age: 74
End: 2024-03-26
Payer: MEDICARE

## 2024-03-26 NOTE — TELEPHONE ENCOUNTER
Patient asking cough medicine after the ER to finish the medication course and follow up with us if still o going. Patient verbally agreed.

## 2024-03-27 ENCOUNTER — TELEPHONE (OUTPATIENT)
Dept: INTERNAL MEDICINE | Facility: CLINIC | Age: 74
End: 2024-03-27
Payer: MEDICARE

## 2024-03-27 NOTE — TELEPHONE ENCOUNTER
----- Message from Arcelia Lester sent at 3/27/2024 11:03 AM CDT -----  Regarding: Post ED visit follow up appt  Good morning: Pt was seen in the ED on 3/24/24 and requires a Post ED visit 7-day follow up appt. Pt is requesting to be contacted for scheduling a follow up appt. Please contact pt to scheduled appt by 4/1/24 if possible.     Thank you  Arcelia Lester

## 2024-04-02 NOTE — TELEPHONE ENCOUNTER
----- Message from Diamone Speed sent at 3/26/2024 11:15 AM CDT -----  Regarding: self  Type: Patient Call Back       Who called: self         What is the request in detail: pt neeeds a kera back about seeing if she can be seen pt have pneumonia ad needs a call back as soon as possible        Can the clinic reply by [facility]? Yes       Would the patient rather a call back or a response via [facility]? Call back       Best call back number:[phone]           Tena López DO  [facility] minutes ago (12:43 PM)     RO  Tylenol is safe in pregnancy. Max doses of 1000 mg up to 4 times a day. However, headaches in pregnancy should be triages, especially if persistent, to determine if they require evaluation for high risk issues in pregnancy such as hypertensive disorders. If the headaches to not respond to the tylenol and/or she notes accompanying symptoms such as changes in vision, CP, SOB, RUQ pain, or LE/ankle swelling then she should be evaluated.

## 2024-04-09 ENCOUNTER — OFFICE VISIT (OUTPATIENT)
Dept: INTERNAL MEDICINE | Facility: CLINIC | Age: 74
End: 2024-04-09
Payer: MEDICARE

## 2024-04-09 ENCOUNTER — HOSPITAL ENCOUNTER (OUTPATIENT)
Dept: RADIOLOGY | Facility: HOSPITAL | Age: 74
Discharge: HOME | End: 2024-04-09
Attending: FAMILY MEDICINE
Payer: MEDICARE

## 2024-04-09 ENCOUNTER — TELEPHONE (OUTPATIENT)
Dept: PREADMISSION TESTING | Facility: HOSPITAL | Age: 74
End: 2024-04-09
Payer: MEDICARE

## 2024-04-09 VITALS
BODY MASS INDEX: 44.26 KG/M2 | HEART RATE: 104 BPM | DIASTOLIC BLOOD PRESSURE: 78 MMHG | OXYGEN SATURATION: 99 % | SYSTOLIC BLOOD PRESSURE: 158 MMHG | WEIGHT: 242 LBS | RESPIRATION RATE: 18 BRPM

## 2024-04-09 DIAGNOSIS — E78.5 HYPERLIPIDEMIA, UNSPECIFIED HYPERLIPIDEMIA TYPE: ICD-10-CM

## 2024-04-09 DIAGNOSIS — Z12.11 SCREENING FOR COLON CANCER: ICD-10-CM

## 2024-04-09 DIAGNOSIS — I69.351 HEMIPARESIS AFFECTING RIGHT SIDE AS LATE EFFECT OF CEREBROVASCULAR ACCIDENT (CVA): ICD-10-CM

## 2024-04-09 DIAGNOSIS — J81.0 ACUTE PULMONARY EDEMA: ICD-10-CM

## 2024-04-09 DIAGNOSIS — Z87.01 HISTORY OF PNEUMONIA: ICD-10-CM

## 2024-04-09 DIAGNOSIS — I10 ESSENTIAL HYPERTENSION: ICD-10-CM

## 2024-04-09 DIAGNOSIS — M79.89 SWELLING OF LOWER EXTREMITY: ICD-10-CM

## 2024-04-09 DIAGNOSIS — I10 HYPERTENSION, UNSPECIFIED TYPE: Primary | ICD-10-CM

## 2024-04-09 PROCEDURE — 3077F SYST BP >= 140 MM HG: CPT | Mod: CPTII,S$GLB,, | Performed by: FAMILY MEDICINE

## 2024-04-09 PROCEDURE — 3078F DIAST BP <80 MM HG: CPT | Mod: CPTII,S$GLB,, | Performed by: FAMILY MEDICINE

## 2024-04-09 PROCEDURE — 3288F FALL RISK ASSESSMENT DOCD: CPT | Mod: CPTII,S$GLB,, | Performed by: FAMILY MEDICINE

## 2024-04-09 PROCEDURE — 3008F BODY MASS INDEX DOCD: CPT | Mod: CPTII,S$GLB,, | Performed by: FAMILY MEDICINE

## 2024-04-09 PROCEDURE — G2211 COMPLEX E/M VISIT ADD ON: HCPCS | Mod: S$GLB,,, | Performed by: FAMILY MEDICINE

## 2024-04-09 PROCEDURE — 71046 X-RAY EXAM CHEST 2 VIEWS: CPT | Mod: TC

## 2024-04-09 PROCEDURE — 99999 PR PBB SHADOW E&M-EST. PATIENT-LVL IV: CPT | Mod: PBBFAC,,, | Performed by: FAMILY MEDICINE

## 2024-04-09 PROCEDURE — 99214 OFFICE O/P EST MOD 30 MIN: CPT | Mod: S$GLB,,, | Performed by: FAMILY MEDICINE

## 2024-04-09 PROCEDURE — 4010F ACE/ARB THERAPY RXD/TAKEN: CPT | Mod: CPTII,S$GLB,, | Performed by: FAMILY MEDICINE

## 2024-04-09 PROCEDURE — 1159F MED LIST DOCD IN RCRD: CPT | Mod: CPTII,S$GLB,, | Performed by: FAMILY MEDICINE

## 2024-04-09 PROCEDURE — 71046 X-RAY EXAM CHEST 2 VIEWS: CPT | Mod: 26,,, | Performed by: STUDENT IN AN ORGANIZED HEALTH CARE EDUCATION/TRAINING PROGRAM

## 2024-04-09 PROCEDURE — 3044F HG A1C LEVEL LT 7.0%: CPT | Mod: CPTII,S$GLB,, | Performed by: FAMILY MEDICINE

## 2024-04-09 PROCEDURE — 1101F PT FALLS ASSESS-DOCD LE1/YR: CPT | Mod: CPTII,S$GLB,, | Performed by: FAMILY MEDICINE

## 2024-04-09 RX ORDER — CLOPIDOGREL BISULFATE 75 MG/1
75 TABLET ORAL DAILY
Qty: 90 TABLET | Refills: 3 | Status: SHIPPED | OUTPATIENT
Start: 2024-04-09

## 2024-04-09 RX ORDER — ATORVASTATIN CALCIUM 40 MG/1
40 TABLET, FILM COATED ORAL NIGHTLY
Qty: 90 TABLET | Refills: 0 | Status: SHIPPED | OUTPATIENT
Start: 2024-04-09 | End: 2024-04-26 | Stop reason: SDUPTHER

## 2024-04-09 RX ORDER — AMLODIPINE BESYLATE 10 MG/1
10 TABLET ORAL DAILY
Qty: 90 TABLET | Refills: 3 | Status: SHIPPED | OUTPATIENT
Start: 2024-04-09

## 2024-04-09 RX ORDER — POTASSIUM CHLORIDE 20 MEQ/1
20 TABLET, EXTENDED RELEASE ORAL 2 TIMES DAILY
Qty: 14 TABLET | Refills: 0 | Status: SHIPPED | OUTPATIENT
Start: 2024-04-09 | End: 2024-04-15

## 2024-04-09 RX ORDER — VALSARTAN 320 MG/1
320 TABLET ORAL DAILY
Qty: 90 TABLET | Refills: 1 | Status: SHIPPED | OUTPATIENT
Start: 2024-04-09

## 2024-04-09 RX ORDER — HYDROCHLOROTHIAZIDE 12.5 MG/1
12.5 TABLET ORAL DAILY
Qty: 90 TABLET | Refills: 1 | Status: SHIPPED | OUTPATIENT
Start: 2024-04-09 | End: 2024-04-25

## 2024-04-09 RX ORDER — FUROSEMIDE 20 MG/1
20 TABLET ORAL 2 TIMES DAILY
Qty: 14 TABLET | Refills: 0 | Status: SHIPPED | OUTPATIENT
Start: 2024-04-09 | End: 2024-04-15

## 2024-04-09 NOTE — TELEPHONE ENCOUNTER
----- Message from Mike Oakes MD sent at 4/9/2024 10:25 AM CDT -----  Regarding: RE: Pre op clearance for colonoscopy  I would prefer a visit to check in on her prior to clearance - please arrange a visit Joanna Schwab  ----- Message -----  From: Joanna Mishra LPN  Sent: 4/9/2024  10:05 AM CDT  To: Mike Oakes MD  Subject: FW: Pre op clearance for colonoscopy               ----- Message -----  From: Josselin Payne RN  Sent: 4/9/2024  10:03 AM CDT  To: Mike Oakes MD; Violette Mckeon Staff  Subject: Pre op clearance for colonoscopy                 Dr. Oakes,    Your patient was seen in the ER on 03/24/2024 and diagnosed with pneumonia. I spoke with Mrs. Najera today to do pre op review for upcoming colonoscopy on 04/18/2024. She mentioned she is better, but still having a significant cough causing back soreness. She denies fever. Patient states she reached out to your office for follow up after ER visit and was told to follow up in July. She may have misunderstood instructions. Is patient still cleared from your perspective to continue with colonoscopy and to hold Plavix for 5 days prior or does she need to follow up in clinic before? Please advise. Thanks.

## 2024-04-09 NOTE — TELEPHONE ENCOUNTER
No care due was identified.  [facility] Embedded Care Due Messages. Reference number: [id number].   4/09/2024 9:47:25 AM CDT

## 2024-04-09 NOTE — TELEPHONE ENCOUNTER
----- Message from Mike Oakes MD sent at 4/9/2024 10:25 AM CDT -----  Regarding: RE: Pre op clearance for colonoscopy  I would prefer a visit to check in on her prior to clearance - please arrange a visit Joanna Schwab  ----- Message -----  From: Joanna Mishra LPN  Sent: 4/9/2024  10:05 AM CDT  To: Mike Oakes MD  Subject: FW: Pre op clearance for colonoscopy               ----- Message -----  From: Josselin Payne RN  Sent: 4/9/2024  10:03 AM CDT  To: Mike Oakes MD; Violette Mckeon Staff  Subject: Pre op clearance for colonoscopy                 Dr. Oakes,    Your patient was seen in the ER on 03/24/2024 and diagnosed with pneumonia. I spoke with Mrs. Najera today to do pre op review for upcoming colonoscopy on 04/18/2024. She mentioned she is better, but still having a significant cough causing back soreness. She denies fever. Patient states she reached out to your office for follow up after ER visit and was told to follow up in July. She may have misunderstood instructions. Is patient still cleared from your perspective to continue with colonoscopy and to hold Plavix for 5 days prior or does she need to follow up in clinic before? Please advise. Thanks.         
M62.81 generalized weakness, decreased ADL management and functional transfers/mobility

## 2024-04-09 NOTE — TELEPHONE ENCOUNTER
----- Message from Rodger Christie sent at 4/9/2024  9:19 AM CDT -----  Contact: Tracy Molina is needing refills of clopidogreL (PLAVIX) 75 mg tablet, amLODIPine (NORVASC) 10 MG tablet, atorvastatin (LIPITOR) 40 MG tablet, hydroCHLOROthiazide (HYDRODIURIL) 12.5 MG Tab, and valsartan (DIOVAN) 320 MG tablet.   Please give her a call back at [phone]       [id number] - [place] - [address] AT [facility]  [address]  [place] [address]  Phone: [phone] Fax: [phone]

## 2024-04-09 NOTE — PROGRESS NOTES
Subjective:       Patient ID: Tracy Najera is a 73 y.o. female.    Chief Complaint: Follow-up (Patient to be checked again for pneumonia after having it last week in order to do her colonoscopy. /)    HPI    Patient Active Problem List   Diagnosis    History of CVA with residual deficit    Hyperlipidemia    Essential hypertension    Calcification of aorta    Osteopenia of multiple sites    Class 3 severe obesity due to excess calories with serious comorbidity and body mass index (BMI) of 40.0 to 44.9 in adult    History of colon polyps    Bilateral lower extremity edema    Hemiparesis affecting right side as late effect of cerebrovascular accident (CVA)    Chronic pain of left knee    Decreased hearing of both ears    Prediabetes       Past Medical History:   Diagnosis Date    Anticoagulant long-term use     Plavix    Arthritis     Bloody discharge from left nipple 6/1/2017    Cerebral artery occlusion with cerebral infarction 9/5/2013 2:35:44 PM    [facility] Historical - Cardiovascular: CVA (Cerebrovascular accident)-No Additional Notes    Complications affecting other specified body systems, hypertension 9/5/2013 2:32:20 PM    [facility] Historical - Cardiovascular: Hypertension-No Additional Notes    CVA (cerebral vascular accident) 2008    R hemiparesis, R hand contracture    HTN (hypertension)     Hyperlipidemia     Other and unspecified hyperlipidemia 9/5/2013 2:32:23 PM    [facility] Historical - Endocrine/Metabolic/Immune: Hyperlipidemia-No Additional Notes    Severe obesity (BMI 35.0-39.9) with comorbidity     Special screening for malignant neoplasms, colon 4/16/2019       Past Surgical History:   Procedure Laterality Date    BREAST BIOPSY Left 2017    COLONOSCOPY N/A 04/16/2019    Procedure: COLONOSCOPY;  Surgeon: Ulisses Jacobsen MD;  Location: [facility];  Service: Endoscopy;  Laterality: N/A;    HYSTERECTOMY      partial     TONSILLECTOMY         Family History   Problem Relation Age of Onset     Hypertension Mother     Hypertension Sister     Cancer Neg Hx     Diabetes Neg Hx     Heart disease Neg Hx     Kidney disease Neg Hx     Hyperlipidemia Neg Hx        Social History     Tobacco Use   Smoking Status Never    Passive exposure: Never   Smokeless Tobacco Never       Wt Readings from Last 5 Encounters:   04/09/24 109.8 kg (242 lb)   03/24/24 110.5 kg (243 lb 9.7 oz)   02/29/24 104.3 kg (230 lb)   01/25/24 107.8 kg (237 lb 10.5 oz)   10/18/23 108.2 kg (238 lb 6.8 oz)       For further HPI details, see assessment and plan.    Review of Systems   Constitutional:  Negative for chills and fever.   Respiratory:  Positive for cough. Negative for shortness of breath.    Cardiovascular:  Positive for leg swelling. Negative for chest pain.       Objective:      Vitals:    04/09/24 1549   BP: (!) 158/78   Pulse: 104   Resp: 18       Physical Exam  patient seems at her baseline cognitively and neurologically. Pleasant..  There does seem to be some increased work of breathing.  No overt crackles but some audible noise with exhalation.  Lower extremity swelling   Notable shoe line indentation.  Assessment:       1. Hypertension, unspecified type    2. Swelling of lower extremity    3. Acute pulmonary edema    4. History of pneumonia    5. Screening for colon cancer        Plan:       Presents for f/u   I was recently sent a message seeking clearance for colonoscopy after her recent ER visit for pneumonia  - I requested an evaluation    Upon review patient had pulmonary edema bilaterally in addition to potential pneumonia finding.  I do have concerns given cardiomegaly, uncontrolled hypertension, lower extremity swelling that her underlying shortness of breath may not entirely be a pneumonia but potential CHF exacerbation.  She did not seem to have any fevers (per he record and ER record).  She did not have any elevation in white blood cell count.  I would expect to see those in a pneumonia    She has improved with  antibiotics but not entirely in is still working to breathe somewhat although she states her breathing has stabilized. Cough still persistent.    Plan to obtain a repeat chest x-ray to evaluate pulmonary edema persistence.  If this is pneumonia that will not have cleared up for another several weeks.      Plan to initiate Lasix.  20 mg.  This is in addition to her other blood pressure medication.  I will be also placing her on potassium supplementation.  I have sent in a 2 week supply of such.    Plan to obtain lab work to include a BNP   Plan to obtain next available cardiology consultation.    Colonoscopy is on hold until this has resolved and has been thoroughly evaluated.      HTN  uncontrolled  Refilled her blood pressure earlier today.  Her blood pressure is uncontrolled.  I would like to see her soon after a cardiology visit to reassess her blood pressure.  Encouraged she check at home and bring in log and machine. Continue meds as is:  On amlodipine 10   On hydrochlorothiazide 12.5   On valsartan 320   Lasix 20 to be started   Log and bring in log and machine to next visit  F/u in 2 weeks    Visit today included increased complexity associated with the care of the above mentioned issues , which was addressed while instituting co-management of the longitudinal care of the patient due to the serious and/or complex managed problem(s) .    I have evaluated and discussed management associated with medical care services that serve as the continuing focal point for all needed health care services and/or with medical care services that are part of ongoing care related to my patient's single, serious condition or a complex condition(s).    I am providing ongoing care and I am the primary care provider for this patient, and they are being managed, monitored, and/or observed for their chronic conditions over time.     I have addressed their ongoing health maintenance requirements and needs for all health care services  and reviewed co-management plans provided by specialty providers when available.        This note was verbally dictated, please excuse any type errors.

## 2024-04-10 ENCOUNTER — HOSPITAL ENCOUNTER (OUTPATIENT)
Dept: RADIOLOGY | Facility: HOSPITAL | Age: 74
Discharge: HOME | End: 2024-04-10
Attending: FAMILY MEDICINE
Payer: MEDICARE

## 2024-04-10 DIAGNOSIS — Z12.39 ENCOUNTER FOR SCREENING FOR MALIGNANT NEOPLASM OF BREAST, UNSPECIFIED SCREENING MODALITY: ICD-10-CM

## 2024-04-10 PROCEDURE — 77067 SCR MAMMO BI INCL CAD: CPT | Mod: TC

## 2024-04-10 PROCEDURE — 77067 SCR MAMMO BI INCL CAD: CPT | Mod: 26,,, | Performed by: RADIOLOGY

## 2024-04-10 PROCEDURE — 77063 BREAST TOMOSYNTHESIS BI: CPT | Mod: 26,,, | Performed by: RADIOLOGY

## 2024-04-13 DIAGNOSIS — J81.0 ACUTE PULMONARY EDEMA: ICD-10-CM

## 2024-04-14 NOTE — TELEPHONE ENCOUNTER
No care due was identified.  [facility] Embedded Care Due Messages. Reference number: [id number].   4/13/2024 4:08:02 AM CDT  
Refill Routing Note   Medication(s) are not appropriate for processing by [facility] for the following reason(s):        Required vitals abnormal  New or recently adjusted medication    [facility] action(s):  Defer             Appointments  past 12m or future 3m with PCP    Date Provider   Last Visit   4/9/2024 Mike Oakes MD   Next Visit   4/26/2024 Mike Oakes MD   ED visits in past 90 days: 1        Note composed:11:35 PM 04/13/2024               
Acuity of illness

## 2024-04-15 RX ORDER — POTASSIUM CHLORIDE 20 MEQ/1
20 TABLET, EXTENDED RELEASE ORAL 2 TIMES DAILY
Qty: 14 TABLET | Refills: 0 | Status: SHIPPED | OUTPATIENT
Start: 2024-04-15 | End: 2024-04-22

## 2024-04-15 RX ORDER — FUROSEMIDE 20 MG/1
20 TABLET ORAL 2 TIMES DAILY
Qty: 14 TABLET | Refills: 0 | Status: SHIPPED | OUTPATIENT
Start: 2024-04-15 | End: 2024-04-22

## 2024-04-19 DIAGNOSIS — J81.0 ACUTE PULMONARY EDEMA: ICD-10-CM

## 2024-04-19 NOTE — TELEPHONE ENCOUNTER
No care due was identified.  [facility] Embedded Care Due Messages. Reference number: [id number].   4/19/2024 4:07:04 AM CDT

## 2024-04-19 NOTE — TELEPHONE ENCOUNTER
Refill Routing Note   Medication(s) are not appropriate for processing by [facility] for the following reason(s):        New or recently adjusted medication  Required vitals abnormal    [facility] action(s):  Defer        Medication Therapy Plan: 90 DAY REQUESTS      Appointments  past 12m or future 3m with PCP    Date Provider   Last Visit   4/9/2024 Mike Oakes MD   Next Visit   4/26/2024 Mike Oakes MD   ED visits in past 90 days: 1        Note composed:5:20 PM 04/19/2024

## 2024-04-22 RX ORDER — POTASSIUM CHLORIDE 20 MEQ/1
20 TABLET, EXTENDED RELEASE ORAL 2 TIMES DAILY
Qty: 60 TABLET | Refills: 0 | Status: SHIPPED | OUTPATIENT
Start: 2024-04-22 | End: 2024-04-26 | Stop reason: SDUPTHER

## 2024-04-22 RX ORDER — FUROSEMIDE 20 MG/1
20 TABLET ORAL 2 TIMES DAILY
Qty: 60 TABLET | Refills: 0 | Status: SHIPPED | OUTPATIENT
Start: 2024-04-22 | End: 2024-04-26 | Stop reason: SDUPTHER

## 2024-04-22 NOTE — Clinical Note
Patient education: What is a sleep study?     What is a sleep study? -- A sleep study is a test that measures how well you sleep and checks for sleep problems. For some sleep studies, you stay overnight in a sleep lab at a hospital or sleep center.     What happens during a sleep study? -- Before you go to sleep, a technician attaches small, sticky patches called  electrodes  to your head, chest, and legs. He or she will also place a small tube beneath your nose and might wrap 1 or 2 belts around your chest.   Each of these items has wires that connect to monitors. The monitors record your movement, brain activity, breathing, and other body functions while you sleep.  If you have a history of trouble falling asleep, your doctor might prescribe a medicine to help you fall asleep in the lab. If you have never taken the medicine before, your doctor might ask you take it on a night before your sleep study to see how it affects you.   Why might my doctor order a sleep study? -- Your doctor will order a sleep study if he or she thinks you have sleep apnea or a different condition that makes you:   ?Have sudden jerking leg movements while you sleep, called  periodic limb movements.    ?Feel very sleepy during the day and fall asleep all of a sudden, called  narcolepsy.    ?Have trouble falling asleep or staying asleep over a long period of time, called  chronic insomnia.    ?Do odd things while you sleep, such as walking.  How should I prepare for a sleep study? -- On the day of your sleep study, you should:   ?Avoid alcohol   ?Avoid drinking coffee, tea, sodas, and other drinks that have caffeine in the afternoon and evening   ?Take all of your regular medicines     The cost of care estimate line is [phone]. They are able to give the patient an estimate of the charges and also an estimate of their insurance coverage/patient responsibility.   After your sleep study is performed, please call us at [phone] or  Your patient was seen today for a HRA visit. Abnormalities (BOLDED) have been identified during this visit that may require additional testing and  follow up. I have included a copy of my visit note, please review the note and feel free to contact me with any questions. Thank you for allowing me to participate in the care of your patients. Sherine Singer NP  [phone]  to schedule for a follow up to review the results of the sleep study.    Please bring one tab of low dose melatonin 3 mg or less to the night of the study.    Melatonin intake is completely voluntary.    You may take own melatonin after arrival to sleep center. Do not drive or operate machinery after intake of melatonin.     Please make every effort you can to sleep on your back with one pillow behind your head.    Please also call your insurance company next week to see if your sleep study is approved and find out your co-pay.

## 2024-04-24 DIAGNOSIS — I10 ESSENTIAL HYPERTENSION: Primary | ICD-10-CM

## 2024-04-25 ENCOUNTER — HOSPITAL ENCOUNTER (OUTPATIENT)
Dept: CARDIOLOGY | Facility: HOSPITAL | Age: 74
Discharge: HOME | End: 2024-04-25
Attending: INTERNAL MEDICINE
Payer: MEDICARE

## 2024-04-25 ENCOUNTER — OFFICE VISIT (OUTPATIENT)
Dept: CARDIOLOGY | Facility: CLINIC | Age: 74
End: 2024-04-25
Payer: MEDICARE

## 2024-04-25 VITALS
OXYGEN SATURATION: 97 % | HEIGHT: 62 IN | HEART RATE: 97 BPM | SYSTOLIC BLOOD PRESSURE: 138 MMHG | BODY MASS INDEX: 44.38 KG/M2 | DIASTOLIC BLOOD PRESSURE: 78 MMHG | WEIGHT: 241.19 LBS

## 2024-04-25 DIAGNOSIS — I10 ESSENTIAL HYPERTENSION: ICD-10-CM

## 2024-04-25 DIAGNOSIS — R60.0 BILATERAL LOWER EXTREMITY EDEMA: ICD-10-CM

## 2024-04-25 DIAGNOSIS — I69.351 HEMIPARESIS AFFECTING RIGHT SIDE AS LATE EFFECT OF CEREBROVASCULAR ACCIDENT (CVA): Primary | ICD-10-CM

## 2024-04-25 DIAGNOSIS — E66.01 CLASS 3 SEVERE OBESITY DUE TO EXCESS CALORIES WITH SERIOUS COMORBIDITY AND BODY MASS INDEX (BMI) OF 40.0 TO 44.9 IN ADULT: ICD-10-CM

## 2024-04-25 DIAGNOSIS — E78.2 MIXED HYPERLIPIDEMIA: ICD-10-CM

## 2024-04-25 DIAGNOSIS — I70.0 CALCIFICATION OF AORTA: ICD-10-CM

## 2024-04-25 DIAGNOSIS — J81.0 ACUTE PULMONARY EDEMA: ICD-10-CM

## 2024-04-25 PROCEDURE — 1160F RVW MEDS BY RX/DR IN RCRD: CPT | Mod: CPTII,S$GLB,, | Performed by: INTERNAL MEDICINE

## 2024-04-25 PROCEDURE — 4010F ACE/ARB THERAPY RXD/TAKEN: CPT | Mod: CPTII,S$GLB,, | Performed by: INTERNAL MEDICINE

## 2024-04-25 PROCEDURE — 3075F SYST BP GE 130 - 139MM HG: CPT | Mod: CPTII,S$GLB,, | Performed by: INTERNAL MEDICINE

## 2024-04-25 PROCEDURE — 3008F BODY MASS INDEX DOCD: CPT | Mod: CPTII,S$GLB,, | Performed by: INTERNAL MEDICINE

## 2024-04-25 PROCEDURE — 93005 ELECTROCARDIOGRAM TRACING: CPT

## 2024-04-25 PROCEDURE — 3044F HG A1C LEVEL LT 7.0%: CPT | Mod: CPTII,S$GLB,, | Performed by: INTERNAL MEDICINE

## 2024-04-25 PROCEDURE — 99999 PR PBB SHADOW E&M-EST. PATIENT-LVL IV: CPT | Mod: PBBFAC,,, | Performed by: INTERNAL MEDICINE

## 2024-04-25 PROCEDURE — 1159F MED LIST DOCD IN RCRD: CPT | Mod: CPTII,S$GLB,, | Performed by: INTERNAL MEDICINE

## 2024-04-25 PROCEDURE — 93010 ELECTROCARDIOGRAM REPORT: CPT | Mod: ,,, | Performed by: INTERNAL MEDICINE

## 2024-04-25 PROCEDURE — 99204 OFFICE O/P NEW MOD 45 MIN: CPT | Mod: S$GLB,,, | Performed by: INTERNAL MEDICINE

## 2024-04-25 PROCEDURE — 1101F PT FALLS ASSESS-DOCD LE1/YR: CPT | Mod: CPTII,S$GLB,, | Performed by: INTERNAL MEDICINE

## 2024-04-25 PROCEDURE — 3288F FALL RISK ASSESSMENT DOCD: CPT | Mod: CPTII,S$GLB,, | Performed by: INTERNAL MEDICINE

## 2024-04-25 PROCEDURE — 3078F DIAST BP <80 MM HG: CPT | Mod: CPTII,S$GLB,, | Performed by: INTERNAL MEDICINE

## 2024-04-25 PROCEDURE — 1126F AMNT PAIN NOTED NONE PRSNT: CPT | Mod: CPTII,S$GLB,, | Performed by: INTERNAL MEDICINE

## 2024-04-25 RX ORDER — METOPROLOL SUCCINATE 50 MG/1
50 TABLET, EXTENDED RELEASE ORAL DAILY
Qty: 30 TABLET | Refills: 11 | Status: SHIPPED | OUTPATIENT
Start: 2024-04-25

## 2024-04-25 NOTE — PROGRESS NOTES
Subjective:   Patient ID:  Tracy Najera is a 73 y.o. female who presents for evaluation of No chief complaint on file.      72 yo female, referred by Dr. Oakes for sob and h/o CVA  PMH h/o CVA on residual right side weakness, HTN HLD no ho MI DM.  Recent PNA with sob cough. Improved  No chest pain palpitation dizziness  LDL 96  BP and Aic controlled  EKG reviewed by myself today reveals NSR             No results found for this or any previous visit.     No results found for this or any previous visit.       Past Medical History:   Diagnosis Date    Anticoagulant long-term use     Plavix    Arthritis     Bloody discharge from left nipple 6/1/2017    Cerebral artery occlusion with cerebral infarction 9/5/2013 2:35:44 PM    [facility] Historical - Cardiovascular: CVA (Cerebrovascular accident)-No Additional Notes    Complications affecting other specified body systems, hypertension 9/5/2013 2:32:20 PM    [facility] Historical - Cardiovascular: Hypertension-No Additional Notes    CVA (cerebral vascular accident) 2008    R hemiparesis, R hand contracture    HTN (hypertension)     Hyperlipidemia     Other and unspecified hyperlipidemia 9/5/2013 2:32:23 PM    [facility] Historical - Endocrine/Metabolic/Immune: Hyperlipidemia-No Additional Notes    Severe obesity (BMI 35.0-39.9) with comorbidity     Special screening for malignant neoplasms, colon 4/16/2019       Past Surgical History:   Procedure Laterality Date    BREAST BIOPSY Left 2017    COLONOSCOPY N/A 04/16/2019    Procedure: COLONOSCOPY;  Surgeon: Ulisses Jacobsen MD;  Location: [facility];  Service: Endoscopy;  Laterality: N/A;    HYSTERECTOMY      partial     TONSILLECTOMY         Social History     Tobacco Use    Smoking status: Never     Passive exposure: Never    Smokeless tobacco: Never   Substance Use Topics    Alcohol use: Not Currently    Drug use: No       Family History   Problem Relation Name Age of Onset    Hypertension Mother       Hypertension Sister      Cancer Neg Hx      Diabetes Neg Hx      Heart disease Neg Hx      Kidney disease Neg Hx      Hyperlipidemia Neg Hx         Review of Systems   Constitutional: Negative for decreased appetite, diaphoresis, fever, malaise/fatigue and night sweats.   HENT:  Negative for nosebleeds.    Eyes:  Negative for blurred vision and double vision.   Cardiovascular:  Positive for dyspnea on exertion. Negative for chest pain, claudication, irregular heartbeat, leg swelling, near-syncope, orthopnea, palpitations, paroxysmal nocturnal dyspnea and syncope.   Respiratory:  Negative for cough, shortness of breath, sleep disturbances due to breathing, snoring, sputum production and wheezing.    Endocrine: Negative for cold intolerance and polyuria.   Hematologic/Lymphatic: Does not bruise/bleed easily.   Skin:  Negative for rash.   Musculoskeletal:  Positive for muscle weakness. Negative for back pain, falls, joint pain, joint swelling and neck pain.   Gastrointestinal:  Negative for abdominal pain, heartburn, nausea and vomiting.   Genitourinary:  Negative for dysuria, frequency and hematuria.   Neurological:  Negative for difficulty with concentration, dizziness, focal weakness, headaches, light-headedness, numbness, seizures and weakness.   Psychiatric/Behavioral:  Negative for depression, memory loss and substance abuse. The patient does not have insomnia.    Allergic/Immunologic: Negative for HIV exposure and hives.       Objective:   Physical Exam  HENT:      Head: Normocephalic.   Eyes:      Pupils: Pupils are equal, round, and reactive to light.   Neck:      Thyroid: No thyromegaly.      Vascular: Normal carotid pulses. No carotid bruit or JVD.   Cardiovascular:      Rate and Rhythm: Normal rate and regular rhythm. No extrasystoles are present.     Chest Wall: PMI is not displaced.      Pulses: Normal pulses.      Heart sounds: Normal heart sounds. No murmur heard.     No gallop. No S3 sounds.    Pulmonary:      Effort: No respiratory distress.      Breath sounds: Normal breath sounds. No stridor.   Abdominal:      General: Bowel sounds are normal.      Palpations: Abdomen is soft.      Tenderness: There is no abdominal tenderness. There is no rebound.   Skin:     Findings: No rash.   Neurological:      Mental Status: She is alert and oriented to person, place, and time.   Psychiatric:         Behavior: Behavior normal.         Lab Results   Component Value Date    CHOL 159 01/25/2024    CHOL 177 01/18/2023    CHOL 158 02/28/2022    CHOL 158 02/28/2022     Lab Results   Component Value Date    HDL 46 01/25/2024    HDL 48 01/18/2023    HDL 48 02/28/2022    HDL 48 02/28/2022     Lab Results   Component Value Date    LDLCALC 96.2 01/25/2024    LDLCALC 109.0 01/18/2023    LDLCALC 94.2 02/28/2022    LDLCALC 94.2 02/28/2022     Lab Results   Component Value Date    TRIG 84 01/25/2024    TRIG 100 01/18/2023    TRIG 79 02/28/2022    TRIG 79 02/28/2022     Lab Results   Component Value Date    CHOLHDL 28.9 01/25/2024    CHOLHDL 27.1 01/18/2023    CHOLHDL 30.4 02/28/2022    CHOLHDL 30.4 02/28/2022       Chemistry        Component Value Date/Time     03/24/2024 1943    K 3.8 03/24/2024 1943     03/24/2024 1943    CO2 23 03/24/2024 1943    BUN 20 03/24/2024 1943    CREATININE 1.1 03/24/2024 1943     (H) 03/24/2024 1943        Component Value Date/Time    CALCIUM 9.7 03/24/2024 1943    ALKPHOS 105 03/24/2024 1943    AST 23 03/24/2024 1943    ALT 22 03/24/2024 1943    BILITOT 0.2 03/24/2024 1943    ESTGFRAFRICA >60.0 02/28/2022 0850    EGFRNONAA 56.8 (A) 02/28/2022 0850          Lab Results   Component Value Date    HGBA1C 5.6 01/25/2024     Lab Results   Component Value Date    TSH 1.810 01/25/2024     Lab Results   Component Value Date    INR 0.9 03/16/2019    INR 1.0 04/30/2012    INR 1.0 08/28/2011     Lab Results   Component Value Date    WBC 11.45 03/24/2024    HGB 12.1 03/24/2024    HCT 37.8  03/24/2024    MCV 89 03/24/2024     03/24/2024     BNP  @LABRCNTIP(BNP,BNPTRIAGEBLO)@  CrCl cannot be calculated (Patient's most recent lab result is older than the maximum 7 days allowed.).  No results found in the last 24 hours.  No results found in the last 24 hours.  No results found in the last 24 hours.    Assessment:      1. Hemiparesis affecting right side as late effect of cerebrovascular accident (CVA)    2. Acute pulmonary edema    3. Calcification of aorta    4. Essential hypertension    5. Mixed hyperlipidemia    6. Class 3 severe obesity due to excess calories with serious comorbidity and body mass index (BMI) of 40.0 to 44.9 in adult    7. Bilateral lower extremity edema        Plan:   D/c hctz and add toprolXl for HTN   Continue lasix for PVD  Continue Plavix statin valsartan and amlodipine  Echo     Counseled DASH  Check Lipid profile with PCP in 6 months  Recommend heart-healthy diet, weight control and regular exercise.  Ana Cristina. Risk modification.   I have reviewed all pertinent labs and cardiac studies independently. Plans and recommendations have been formulated under my direct supervision. All questions answered and patient voiced understanding.   If symptoms persist go to the ED  Rtc as needed

## 2024-04-26 ENCOUNTER — OFFICE VISIT (OUTPATIENT)
Dept: INTERNAL MEDICINE | Facility: CLINIC | Age: 74
End: 2024-04-26
Payer: MEDICARE

## 2024-04-26 VITALS
OXYGEN SATURATION: 97 % | WEIGHT: 236 LBS | DIASTOLIC BLOOD PRESSURE: 65 MMHG | BODY MASS INDEX: 43.16 KG/M2 | HEART RATE: 106 BPM | RESPIRATION RATE: 18 BRPM | SYSTOLIC BLOOD PRESSURE: 120 MMHG

## 2024-04-26 DIAGNOSIS — N18.31 STAGE 3A CHRONIC KIDNEY DISEASE: ICD-10-CM

## 2024-04-26 DIAGNOSIS — E78.5 HYPERLIPIDEMIA, UNSPECIFIED HYPERLIPIDEMIA TYPE: ICD-10-CM

## 2024-04-26 DIAGNOSIS — Z87.01 HISTORY OF PNEUMONIA: ICD-10-CM

## 2024-04-26 DIAGNOSIS — Z86.73 HISTORY OF STROKE: ICD-10-CM

## 2024-04-26 DIAGNOSIS — I10 HYPERTENSION, UNSPECIFIED TYPE: Primary | ICD-10-CM

## 2024-04-26 DIAGNOSIS — J31.0 CHRONIC RHINITIS: ICD-10-CM

## 2024-04-26 DIAGNOSIS — Z79.899 ENCOUNTER FOR LONG-TERM (CURRENT) USE OF MEDICATIONS: ICD-10-CM

## 2024-04-26 DIAGNOSIS — J81.0 ACUTE PULMONARY EDEMA: ICD-10-CM

## 2024-04-26 LAB
OHS QRS DURATION: 78 MS
OHS QTC CALCULATION: 441 MS

## 2024-04-26 PROCEDURE — 3008F BODY MASS INDEX DOCD: CPT | Mod: CPTII,S$GLB,, | Performed by: FAMILY MEDICINE

## 2024-04-26 PROCEDURE — 3078F DIAST BP <80 MM HG: CPT | Mod: CPTII,S$GLB,, | Performed by: FAMILY MEDICINE

## 2024-04-26 PROCEDURE — 99214 OFFICE O/P EST MOD 30 MIN: CPT | Mod: S$GLB,,, | Performed by: FAMILY MEDICINE

## 2024-04-26 PROCEDURE — 99999 PR PBB SHADOW E&M-EST. PATIENT-LVL III: CPT | Mod: PBBFAC,,, | Performed by: FAMILY MEDICINE

## 2024-04-26 PROCEDURE — 4010F ACE/ARB THERAPY RXD/TAKEN: CPT | Mod: CPTII,S$GLB,, | Performed by: FAMILY MEDICINE

## 2024-04-26 PROCEDURE — 1159F MED LIST DOCD IN RCRD: CPT | Mod: CPTII,S$GLB,, | Performed by: FAMILY MEDICINE

## 2024-04-26 PROCEDURE — 1126F AMNT PAIN NOTED NONE PRSNT: CPT | Mod: CPTII,S$GLB,, | Performed by: FAMILY MEDICINE

## 2024-04-26 PROCEDURE — 3288F FALL RISK ASSESSMENT DOCD: CPT | Mod: CPTII,S$GLB,, | Performed by: FAMILY MEDICINE

## 2024-04-26 PROCEDURE — G2211 COMPLEX E/M VISIT ADD ON: HCPCS | Mod: S$GLB,,, | Performed by: FAMILY MEDICINE

## 2024-04-26 PROCEDURE — 3074F SYST BP LT 130 MM HG: CPT | Mod: CPTII,S$GLB,, | Performed by: FAMILY MEDICINE

## 2024-04-26 PROCEDURE — 1101F PT FALLS ASSESS-DOCD LE1/YR: CPT | Mod: CPTII,S$GLB,, | Performed by: FAMILY MEDICINE

## 2024-04-26 PROCEDURE — 3044F HG A1C LEVEL LT 7.0%: CPT | Mod: CPTII,S$GLB,, | Performed by: FAMILY MEDICINE

## 2024-04-26 RX ORDER — POTASSIUM CHLORIDE 20 MEQ/1
20 TABLET, EXTENDED RELEASE ORAL 2 TIMES DAILY
Qty: 90 TABLET | Refills: 1 | Status: SHIPPED | OUTPATIENT
Start: 2024-04-26

## 2024-04-26 RX ORDER — FUROSEMIDE 20 MG/1
20 TABLET ORAL 2 TIMES DAILY
Qty: 90 TABLET | Refills: 1 | Status: SHIPPED | OUTPATIENT
Start: 2024-04-26

## 2024-04-26 RX ORDER — ATORVASTATIN CALCIUM 40 MG/1
40 TABLET, FILM COATED ORAL NIGHTLY
Qty: 90 TABLET | Refills: 1 | Status: SHIPPED | OUTPATIENT
Start: 2024-04-26

## 2024-04-26 RX ORDER — FLUTICASONE PROPIONATE 50 MCG
1 SPRAY, SUSPENSION (ML) NASAL DAILY
Qty: 48 G | Refills: 11 | Status: SHIPPED | OUTPATIENT
Start: 2024-04-26

## 2024-04-26 RX ORDER — AZELASTINE 1 MG/ML
SPRAY, METERED NASAL
Qty: 30 ML | Refills: 3 | Status: SHIPPED | OUTPATIENT
Start: 2024-04-26

## 2024-04-26 NOTE — PROGRESS NOTES
Subjective:       Patient ID: Tracy Najera is a 73 y.o. female.    Chief Complaint: Follow-up (Pneumonia follow-up)    Follow-up        Patient Active Problem List   Diagnosis    History of CVA with residual deficit    Hyperlipidemia    Essential hypertension    Calcification of aorta    Osteopenia of multiple sites    Class 3 severe obesity due to excess calories with serious comorbidity and body mass index (BMI) of 40.0 to 44.9 in adult    History of colon polyps    Bilateral lower extremity edema    Hemiparesis affecting right side as late effect of cerebrovascular accident (CVA)    Chronic pain of left knee    Decreased hearing of both ears    Prediabetes       Past Medical History:   Diagnosis Date    Anticoagulant long-term use     Plavix    Arthritis     Bloody discharge from left nipple 6/1/2017    Cerebral artery occlusion with cerebral infarction 9/5/2013 2:35:44 PM    [facility] Historical - Cardiovascular: CVA (Cerebrovascular accident)-No Additional Notes    Complications affecting other specified body systems, hypertension 9/5/2013 2:32:20 PM    [facility] Historical - Cardiovascular: Hypertension-No Additional Notes    CVA (cerebral vascular accident) 2008    R hemiparesis, R hand contracture    HTN (hypertension)     Hyperlipidemia     Other and unspecified hyperlipidemia 9/5/2013 2:32:23 PM    [facility] Historical - Endocrine/Metabolic/Immune: Hyperlipidemia-No Additional Notes    Severe obesity (BMI 35.0-39.9) with comorbidity     Special screening for malignant neoplasms, colon 4/16/2019       Past Surgical History:   Procedure Laterality Date    BREAST BIOPSY Left 2017    COLONOSCOPY N/A 04/16/2019    Procedure: COLONOSCOPY;  Surgeon: Ulisses Jacobsen MD;  Location: [facility];  Service: Endoscopy;  Laterality: N/A;    HYSTERECTOMY      partial     TONSILLECTOMY         Family History   Problem Relation Name Age of Onset    Hypertension Mother      Hypertension Sister      Cancer Neg Hx       Diabetes Neg Hx      Heart disease Neg Hx      Kidney disease Neg Hx      Hyperlipidemia Neg Hx         Social History     Tobacco Use   Smoking Status Never    Passive exposure: Never   Smokeless Tobacco Never       Wt Readings from Last 5 Encounters:   04/26/24 107 kg (236 lb)   04/25/24 109.4 kg (241 lb 2.9 oz)   04/09/24 109.8 kg (242 lb)   03/24/24 110.5 kg (243 lb 9.7 oz)   02/29/24 104.3 kg (230 lb)       For further HPI details, see assessment and plan.    Review of Systems    Objective:      Vitals:    04/26/24 1452   BP: 120/65   Pulse:    Resp:        Physical Exam  Constitutional:       General: She is not in acute distress.     Appearance: She is not ill-appearing.   Pulmonary:      Effort: Pulmonary effort is normal. No respiratory distress.   Neurological:      General: No focal deficit present.      Mental Status: She is alert.   Psychiatric:         Mood and Affect: Mood normal.         Behavior: Behavior normal.         Assessment:       1. Hypertension, unspecified type    2. History of pneumonia    3. Stage 3a chronic kidney disease    4. History of stroke    5. Hyperlipidemia, unspecified hyperlipidemia type    6. Chronic rhinitis    7. Acute pulmonary edema    8. Encounter for long-term (current) use of medications        Plan:     For follow-up encounter  Patient seems to be doing well No active complaints    History of pneumonia    She has having no shortness of breath.  No chest pain.  No fevers.  She does have a mild cough but does seem improving.  We will continue to monitor but I feel she is much improved with a resolved pneumonia.  Most recent chest x-ray did have some abnormal findings indicative of an ongoing pneumonia.  I suspect that will linger on chest x-ray imaging for several weeks post infection    Hypertension  Blood pressure is markedly elevated in the office but she has been checking at home.  He reports he has running in the 115-120/60-70 range.  We will continue her  medications as is   Her cardiologist discontinued hydrochlorothiazide and added metoprolol XL   This is in addition to amlodipine 10 and valsartan 320.  Lasix 20  Continue medications.  Continue monitoring.  It appears Cardiology desires Lasix to be continued.  We will provide refills.  Patient was take potassium supplementation while taking Lasix.  Refill that as well.  Nursing visit to be arranged to check her BP machine for accuracy.      Echocardiogram ordered.  Not yet done.  Upon completion as long as stable I see no formal contraindication to proceeding with her colon cancer screening    History of stroke   Stable   Continue her Plavix and statin and good blood pressure control     Allergic rhinitis   Patient benefits with the nasal sprays.  Continue Astelin Flonase       Chronic Kidney disease stage IIIA   Continue to monitor.  Avoid NSAIDs.  Continue pressure control    List reviewed.  At present time should be 100% accurate.  I have sent in prescriptions.    Lab 6 mo  F/u me soon after      This note was verbally dictated, please excuse any type errors.

## 2024-05-03 ENCOUNTER — TELEPHONE (OUTPATIENT)
Dept: CARDIOLOGY | Facility: CLINIC | Age: 74
End: 2024-05-03
Payer: MEDICARE

## 2024-05-03 NOTE — TELEPHONE ENCOUNTER
Robi Oropeza MD [facility] Staff1 hour ago (10:34 AM)       Due to the h/o HTN HLD pulm edema, echo will help to r/o CHF       LM for pt to call us back        -from 4/25 visit- pt wants to know why she needs an ECHO  Please advise        ---- Message from Frances Mancera RN sent at 5/3/2024  3:09 PM CDT -----  Regarding: FW: Echo  Dr. Oropeza and staff,   This patient states that nobody contacted her 04/29 regarding the echocardiogram however this message was completed.  Please see below message and advise.  ----- Message -----  From: Frances Mancera RN  Sent: 4/29/2024   1:53 PM CDT  To: Sandip Rosenbaum Staff  Subject: Echo                                             Dr. Oropeza and staff,  I called this patient to remind her that once she has her echocardiogram completed we can schedule her for a colonoscopy. She wanted to know if someone could call her with a reason on why she needs the echocardiogram.   Can someone please reach out to this patient.   Thank you.

## 2024-05-06 ENCOUNTER — TELEPHONE (OUTPATIENT)
Dept: PREADMISSION TESTING | Facility: HOSPITAL | Age: 74
End: 2024-05-06
Payer: MEDICARE

## 2024-05-14 ENCOUNTER — TELEPHONE (OUTPATIENT)
Dept: PREADMISSION TESTING | Facility: HOSPITAL | Age: 74
End: 2024-05-14
Payer: MEDICARE

## 2024-05-14 NOTE — TELEPHONE ENCOUNTER
Spoke to pt and she wanted to know why she needed echo. Let her know that Dr Oropeza wanted it done to make sure her heart was functioning properly.  She stated she would call back and hung up.

## 2024-06-07 ENCOUNTER — PATIENT MESSAGE (OUTPATIENT)
Dept: PRIMARY CARE CLINIC | Facility: CLINIC | Age: 74
End: 2024-06-07
Payer: MEDICARE

## 2024-06-19 ENCOUNTER — PATIENT MESSAGE (OUTPATIENT)
Dept: CARDIOLOGY | Facility: HOSPITAL | Age: 74
End: 2024-06-19
Payer: MEDICARE

## 2024-07-31 ENCOUNTER — PATIENT MESSAGE (OUTPATIENT)
Dept: RESEARCH | Facility: HOSPITAL | Age: 74
End: 2024-07-31
Payer: MEDICARE

## 2024-10-09 RX ORDER — VALSARTAN 320 MG/1
TABLET ORAL
Qty: 90 TABLET | Refills: 1 | Status: SHIPPED | OUTPATIENT
Start: 2024-10-09

## 2024-10-09 NOTE — TELEPHONE ENCOUNTER
No care due was identified.  [facility] Embedded Care Due Messages. Reference number: [id number].   10/09/2024 4:10:40 AM CDT

## 2024-10-09 NOTE — TELEPHONE ENCOUNTER
Refill Decision Note   Tracy Najera  is requesting a refill authorization.  Brief Assessment and Rationale for Refill:  Approve     Medication Therapy Plan:        Comments:     Note composed:10:12 AM 10/09/2024

## 2024-10-22 ENCOUNTER — PATIENT OUTREACH (OUTPATIENT)
Dept: ADMINISTRATIVE | Facility: HOSPITAL | Age: 74
End: 2024-10-22
Payer: MEDICARE

## 2024-10-22 DIAGNOSIS — N18.9 CHRONIC KIDNEY DISEASE, UNSPECIFIED CKD STAGE: Primary | ICD-10-CM

## 2024-11-07 ENCOUNTER — HOSPITAL ENCOUNTER (EMERGENCY)
Facility: HOSPITAL | Age: 74
Discharge: HOME | End: 2024-11-07
Attending: EMERGENCY MEDICINE
Payer: MEDICARE

## 2024-11-07 VITALS
BODY MASS INDEX: 43.53 KG/M2 | RESPIRATION RATE: 16 BRPM | OXYGEN SATURATION: 98 % | WEIGHT: 236.56 LBS | HEIGHT: 62 IN | SYSTOLIC BLOOD PRESSURE: 139 MMHG | HEART RATE: 82 BPM | TEMPERATURE: 98 F | DIASTOLIC BLOOD PRESSURE: 71 MMHG

## 2024-11-07 DIAGNOSIS — M25.551 ACUTE RIGHT HIP PAIN: ICD-10-CM

## 2024-11-07 DIAGNOSIS — M51.372 DEGENERATION OF INTERVERTEBRAL DISC OF LUMBOSACRAL REGION WITH DISCOGENIC BACK PAIN AND LOWER EXTREMITY PAIN: ICD-10-CM

## 2024-11-07 DIAGNOSIS — M54.31 SCIATICA OF RIGHT SIDE: Primary | ICD-10-CM

## 2024-11-07 LAB
BILIRUB UR QL STRIP: NEGATIVE
CLARITY UR: ABNORMAL
COLOR UR: YELLOW
GLUCOSE UR QL STRIP: NEGATIVE
HGB UR QL STRIP: NEGATIVE
KETONES UR QL STRIP: NEGATIVE
LEUKOCYTE ESTERASE UR QL STRIP: NEGATIVE
NITRITE UR QL STRIP: NEGATIVE
PH UR STRIP: 7 [PH] (ref 5–8)
PROT UR QL STRIP: ABNORMAL
SP GR UR STRIP: 1.02 (ref 1–1.03)
URN SPEC COLLECT METH UR: ABNORMAL
UROBILINOGEN UR STRIP-ACNC: NEGATIVE EU/DL

## 2024-11-07 PROCEDURE — 81003 URINALYSIS AUTO W/O SCOPE: CPT | Performed by: EMERGENCY MEDICINE

## 2024-11-07 PROCEDURE — 96372 THER/PROPH/DIAG INJ SC/IM: CPT | Performed by: EMERGENCY MEDICINE

## 2024-11-07 PROCEDURE — 99284 EMERGENCY DEPT VISIT MOD MDM: CPT | Mod: 25

## 2024-11-07 PROCEDURE — 63600175 PHARM REV CODE 636 W HCPCS: Performed by: EMERGENCY MEDICINE

## 2024-11-07 RX ORDER — HYDROCODONE BITARTRATE AND ACETAMINOPHEN 5; 325 MG/1; MG/1
1 TABLET ORAL EVERY 6 HOURS PRN
Qty: 12 TABLET | Refills: 0 | Status: SHIPPED | OUTPATIENT
Start: 2024-11-07 | End: 2024-11-17

## 2024-11-07 RX ORDER — HYDROCHLOROTHIAZIDE 12.5 MG/1
12.5 TABLET ORAL EVERY MORNING
COMMUNITY
Start: 2024-10-02

## 2024-11-07 RX ORDER — GABAPENTIN 100 MG/1
100 CAPSULE ORAL 3 TIMES DAILY
Qty: 90 CAPSULE | Refills: 0 | Status: SHIPPED | OUTPATIENT
Start: 2024-11-07

## 2024-11-07 RX ORDER — KETOROLAC TROMETHAMINE 30 MG/ML
30 INJECTION, SOLUTION INTRAMUSCULAR; INTRAVENOUS
Status: COMPLETED | OUTPATIENT
Start: 2024-11-07 | End: 2024-11-07

## 2024-11-07 RX ADMIN — KETOROLAC TROMETHAMINE 30 MG: 30 INJECTION, SOLUTION INTRAMUSCULAR at 03:11

## 2024-11-07 NOTE — ED PROVIDER NOTES
SCRIBE #1 NOTE: I, Bubba Sharma, am scribing for, and in the presence of, Sloan Layne MD. I have scribed the entire note.       History     Chief Complaint   Patient presents with    Hip Pain     Right hip pain radiating down leg onset 3 days ago; no relief from tylenol; difficulty bearing weight; denies fall/injury     Review of patient's allergies indicates:  No Known Allergies      History of Present Illness     HPI    11/7/2024, 3:33 AM  History obtained from the patient      History of Present Illness: Tracy Najera is a 74 y.o. female patient with a PMHx of HTN who presents to the Emergency Department for evaluation of lower right back pain which onset gradually 3 days ago. Patient describes her pain that starts at her lower right back and radiates to her RLE. Symptoms are constant and moderate in severity. She reports pain worsens with movement. Patient denies any injury, numbness, weakness, bowel/bladder incontinence, fever, chills, and all other sxs at this time. Prior tx includes Tylenol which provided no relief. No further complaints or concerns at this time.       Arrival mode: Personal vehicle    PCP: Mike Oakes MD        Past Medical History:  Past Medical History:   Diagnosis Date    Anticoagulant long-term use     Plavix    Arthritis     Bloody discharge from left nipple 6/1/2017    Cerebral artery occlusion with cerebral infarction 9/5/2013 2:35:44 PM    [facility] Historical - Cardiovascular: CVA (Cerebrovascular accident)-No Additional Notes    Complications affecting other specified body systems, hypertension 9/5/2013 2:32:20 PM    [facility] Historical - Cardiovascular: Hypertension-No Additional Notes    CVA (cerebral vascular accident) 2008    R hemiparesis, R hand contracture    HTN (hypertension)     Hyperlipidemia     Other and unspecified hyperlipidemia 9/5/2013 2:32:23 PM    [facility] Historical - Endocrine/Metabolic/Immune: Hyperlipidemia-No Additional Notes    Severe  Yes obesity (BMI 35.0-39.9) with comorbidity     Special screening for malignant neoplasms, colon 4/16/2019       Past Surgical History:  Past Surgical History:   Procedure Laterality Date    BREAST BIOPSY Left 2017    COLONOSCOPY N/A 04/16/2019    Procedure: COLONOSCOPY;  Surgeon: Ulisses Jacobsen MD;  Location: [facility];  Service: Endoscopy;  Laterality: N/A;    HYSTERECTOMY      partial     TONSILLECTOMY           Family History:  Family History   Problem Relation Name Age of Onset    Hypertension Mother      Hypertension Sister      Cancer Neg Hx      Diabetes Neg Hx      Heart disease Neg Hx      Kidney disease Neg Hx      Hyperlipidemia Neg Hx         Social History:  Social History     Tobacco Use    Smoking status: Never     Passive exposure: Never    Smokeless tobacco: Never   Substance and Sexual Activity    Alcohol use: Not Currently    Drug use: No    Sexual activity: Yes     Partners: Male        Review of Systems     Review of Systems   Constitutional:  Negative for fever.   HENT:  Negative for sore throat.    Respiratory:  Negative for shortness of breath.    Cardiovascular:  Negative for chest pain.   Gastrointestinal:  Negative for nausea.   Genitourinary:  Negative for dysuria.   Musculoskeletal:  Positive for back pain (lower).   Skin:  Negative for rash.   Neurological:  Negative for weakness.   Hematological:  Does not bruise/bleed easily.   All other systems reviewed and are negative.       Physical Exam     Initial Vitals [11/07/24 0056]   BP Pulse Resp Temp SpO2   (!) 144/59 85 16 98.2 °F (36.8 °C) 95 %      MAP       --          Physical Exam   Nursing Notes and Vital Signs Reviewed.  Constitutional: Patient is in no acute distress. Well-developed and well-nourished.  Head: Atraumatic. Normocephalic.  Eyes: PERRL. EOM intact. Conjunctivae are not pale. No scleral icterus.  ENT: Mucous membranes are moist. Oropharynx is clear and symmetric.    Neck: Supple. Full ROM. No  "lymphadenopathy.  Cardiovascular: Regular rate. Regular rhythm. No murmurs, rubs, or gallops. Distal pulses are 2+ and symmetric.  Pulmonary/Chest: No respiratory distress. Clear to auscultation bilaterally. No wheezing or rales.  Abdominal: Soft and non-distended.  There is no tenderness.  No rebound, guarding, or rigidity. Good bowel sounds.  Genitourinary: No CVA tenderness  Musculoskeletal: Moves all extremities. No obvious deformities. No edema. No calf tenderness.  Skin: Warm and dry.  Neurological:  Alert, awake, and appropriate.  Normal speech.  No acute focal neurological deficits are appreciated.  Psychiatric: Normal affect. Good eye contact. Appropriate in content.     ED Course   Procedures  ED Vital Signs:  Vitals:    11/07/24 0056 11/07/24 0232 11/07/24 0344   BP: (!) 144/59 139/71    Pulse: 85 82    Resp: 16 16    Temp: 98.2 °F (36.8 °C)     TempSrc: Oral     SpO2: 95% 98%    Weight:  107.3 kg (236 lb 8.9 oz) 107.3 kg (236 lb 8.9 oz)   Height:  5' 2" (1.575 m)        Abnormal Lab Results:  Labs Reviewed   URINALYSIS, REFLEX TO URINE CULTURE - Abnormal       Result Value    Specimen UA Urine, Clean Catch      Color, UA Yellow      Appearance, UA Hazy (*)     pH, UA 7.0      Specific Gravity, UA 1.020      Protein, UA Trace (*)     Glucose, UA Negative      Ketones, UA Negative      Bilirubin (UA) Negative      Occult Blood UA Negative      Nitrite, UA Negative      Urobilinogen, UA Negative      Leukocytes, UA Negative      Narrative:     Specimen Source->Urine        All Lab Results:  Results for orders placed or performed during the hospital encounter of 11/07/24   Urinalysis, Reflex to Urine Culture Urine, Clean Catch    Collection Time: 11/07/24  3:53 AM    Specimen: Urine   Result Value Ref Range    Specimen UA Urine, Clean Catch     Color, UA Yellow Yellow, Straw, Vale    Appearance, UA Hazy (A) Clear    pH, UA 7.0 5.0 - 8.0    Specific Gravity, UA 1.020 1.005 - 1.030    Protein, UA Trace (A) " Negative    Glucose, UA Negative Negative    Ketones, UA Negative Negative    Bilirubin (UA) Negative Negative    Occult Blood UA Negative Negative    Nitrite, UA Negative Negative    Urobilinogen, UA Negative <2.0 EU/dL    Leukocytes, UA Negative Negative         Imaging Results:  Imaging Results              X-Ray Hip 2 or 3 views Right with Pelvis when performed (In process)                      X-Ray Lumbar Spine Ap And Lateral (In process)                    Type of Interpretation: ED Physician (Independently Interpreted).  Radiology Procedure Done: Lumbar Spine X-Ray.  Interpretation: L5-S1 degenerative disc diseased present.           The Emergency Provider reviewed the vital signs and test results, which are outlined above.     ED Discussion       5:07 AM: Reassessed pt at this time. Discussed with pt all pertinent ED information and results. Discussed pt dx and plan of tx. Gave pt all f/u and return to the ED instructions. All questions and concerns were addressed at this time. Pt expresses understanding of information and instructions, and is comfortable with plan to discharge. Pt is stable for discharge.    I discussed with patient and/or family/caretaker that evaluation in the ED does not suggest any emergent or life threatening medical conditions requiring immediate intervention beyond what was provided in the ED, and I believe patient is safe for discharge.  Regardless, an unremarkable evaluation in the ED does not preclude the development or presence of a serious of life threatening condition. As such, patient was instructed to return immediately for any worsening or change in current symptoms.         Medical Decision Making  Amount and/or Complexity of Data Reviewed  Labs: ordered. Decision-making details documented in ED Course.  Radiology: ordered and independent interpretation performed. Decision-making details documented in ED Course.    Risk  Prescription drug management.                 ED  Medication(s):  Medications   ketorolac injection 30 mg (30 mg Intramuscular Given 11/7/24 0352)       New Prescriptions    GABAPENTIN (NEURONTIN) 100 MG CAPSULE    Take 1 capsule (100 mg total) by mouth 3 (three) times daily.    HYDROCODONE-ACETAMINOPHEN (NORCO) 5-325 MG PER TABLET    Take 1 tablet by mouth every 6 (six) hours as needed for Pain.        Follow-up Information       Mike Oakes MD In 1 day.    Specialty: Family Medicine  Contact information:  [address] [facility] [address] [phone]               [facility]    Specialty: Emergency Medicine  Why: As needed, If symptoms worsen  Contact information:  [address] [facility] [address] [phone]                               Scribe Attestation:   Scribe #1: I performed the above scribed service and the documentation accurately describes the services I performed. I attest to the accuracy of the note.     Attending:   Physician Attestation Statement for Scribe #1: I, Sloan Layne MD, personally performed the services described in this documentation, as scribed by Bubba Sharma, in my presence, and it is both accurate and complete.           Clinical Impression       ICD-10-CM ICD-9-CM   1. Sciatica of right side  M54.31 724.3   2. Acute right hip pain  M25.551 719.45   3. Degeneration of intervertebral disc of lumbosacral region with discogenic back pain and lower extremity pain  M51.372 722.52       Disposition:   Disposition: Discharged  Condition: Stable         Sloan Layne MD  11/07/24 0529

## 2024-11-08 ENCOUNTER — TELEPHONE (OUTPATIENT)
Dept: INTERNAL MEDICINE | Facility: CLINIC | Age: 74
End: 2024-11-08
Payer: MEDICARE

## 2024-11-08 ENCOUNTER — PATIENT OUTREACH (OUTPATIENT)
Dept: EMERGENCY MEDICINE | Facility: HOSPITAL | Age: 74
End: 2024-11-08
Payer: MEDICARE

## 2024-11-08 NOTE — TELEPHONE ENCOUNTER
----- Message from Arcelia Lester sent at 11/8/2024  8:43 AM CST -----  Regarding: Post ED visit follow up appt within 7 days of d/c date 11/7/24  Good morning,    Pt was seen in the ED on 11/7/24 for Acute right hip pain; Sciatica of right side; Degeneration of intervertebral disc of lumbosacral region with discogenic back pain and lower extremity pain and requires a 7-day Post ED visit follow up appt. I am requesting assistance, as I am unable schedule patient. Please contact pt to schedule a follow up appt by 11/14/24 if possible.    Thank you for your assistance,     Arcelia Lester

## 2024-11-11 NOTE — PROGRESS NOTES
Pt visited the ED on 11/7/24. I made 2 attempts to reach patient to assist with scheduling a post ED 7-day follow up with PCP. Unable to reach. Closing encounter.    Arcelia Lester

## 2025-06-25 DIAGNOSIS — R73.03 PREDIABETES: ICD-10-CM

## 2025-06-25 DIAGNOSIS — I10 ESSENTIAL HYPERTENSION: ICD-10-CM

## 2025-08-29 ENCOUNTER — PATIENT MESSAGE (OUTPATIENT)
Dept: ADMINISTRATIVE | Facility: HOSPITAL | Age: 75
End: 2025-08-29
Payer: MEDICARE

## (undated) DEVICE — SUT VICRYL 3-0 27 SH

## (undated) DEVICE — MANIFOLD 4 PORT

## (undated) DEVICE — SEE MEDLINE ITEM 152622

## (undated) DEVICE — SEE MEDLINE ITEM 157027

## (undated) DEVICE — GAUZE SPONGE 4X4 12PLY

## (undated) DEVICE — SEE MEDLINE ITEM 157117

## (undated) DEVICE — SEE MEDLINE ITEM 157137

## (undated) DEVICE — ELECTRODE REM PLYHSV RETURN 9

## (undated) DEVICE — TAPE CLOTH SOFT MEDIPORE 4IN

## (undated) DEVICE — BRA MAMMARY SUPPORT LARGE

## (undated) DEVICE — SEE MEDLINE ITEM 152739

## (undated) DEVICE — COVER OVERHEAD SURG LT BLUE

## (undated) DEVICE — SEE MEDLINE ITEM 154981

## (undated) DEVICE — SYR 10CC LUER LOCK

## (undated) DEVICE — ADHESIVE MASTISOL VIAL 48/BX

## (undated) DEVICE — SUT VICRYL 2-0 27 CT-1

## (undated) DEVICE — SUT MONOCRYL 4.0 PS2 CP496G

## (undated) DEVICE — NDL SAFETY 25G X 1.5 ECLIPSE

## (undated) DEVICE — CLOSURE SKIN STERI STRIP 1/2X4

## (undated) DEVICE — GLOVE SURG BIOGEL LATEX SZ 7.5